# Patient Record
Sex: MALE | Race: WHITE | NOT HISPANIC OR LATINO | Employment: OTHER | URBAN - METROPOLITAN AREA
[De-identification: names, ages, dates, MRNs, and addresses within clinical notes are randomized per-mention and may not be internally consistent; named-entity substitution may affect disease eponyms.]

---

## 2017-03-21 ENCOUNTER — OFFICE VISIT (OUTPATIENT)
Dept: CARDIOLOGY | Facility: CLINIC | Age: 77
End: 2017-03-21

## 2017-03-21 ENCOUNTER — TELEPHONE (OUTPATIENT)
Dept: CARDIOLOGY | Facility: CLINIC | Age: 77
End: 2017-03-21

## 2017-03-21 VITALS
BODY MASS INDEX: 33.79 KG/M2 | HEIGHT: 70 IN | HEART RATE: 64 BPM | WEIGHT: 236 LBS | SYSTOLIC BLOOD PRESSURE: 116 MMHG | DIASTOLIC BLOOD PRESSURE: 80 MMHG

## 2017-03-21 DIAGNOSIS — I25.10 CORONARY ARTERY DISEASE INVOLVING NATIVE CORONARY ARTERY OF NATIVE HEART WITHOUT ANGINA PECTORIS: ICD-10-CM

## 2017-03-21 DIAGNOSIS — N18.9 CHRONIC RENAL INSUFFICIENCY, UNSPECIFIED STAGE: ICD-10-CM

## 2017-03-21 DIAGNOSIS — I10 ESSENTIAL HYPERTENSION: ICD-10-CM

## 2017-03-21 DIAGNOSIS — E11.59 TYPE 2 DIABETES MELLITUS WITH OTHER CIRCULATORY COMPLICATION: ICD-10-CM

## 2017-03-21 DIAGNOSIS — I71.20 THORACIC AORTIC ANEURYSM WITHOUT RUPTURE (HCC): Primary | ICD-10-CM

## 2017-03-21 PROBLEM — E11.9 DIABETES (HCC): Status: ACTIVE | Noted: 2017-03-21

## 2017-03-21 PROCEDURE — 93000 ELECTROCARDIOGRAM COMPLETE: CPT | Performed by: INTERNAL MEDICINE

## 2017-03-21 PROCEDURE — 99214 OFFICE O/P EST MOD 30 MIN: CPT | Performed by: INTERNAL MEDICINE

## 2017-03-21 RX ORDER — ASPIRIN 81 MG/1
81 TABLET ORAL DAILY
COMMUNITY
End: 2018-03-22 | Stop reason: SDDI

## 2017-03-21 NOTE — PROGRESS NOTES
Date of Office Visit: 2017  Encounter Provider: Giana Hughes MD  Place of Service: [facility] CARDIOLOGY  Patient Name: Steve Mace  :1940    Chief complaint  Follow-up of thoracic aortic aneurysm and hypertension    History of Present Illness  Patient is a pleasant 76-year-old gentleman with diabetes, hypertension, hyperlipidemia.  In  he was seen for atypical chest pain.  A CT scan of his chest revealed a large pericardial effusion which was treated with nonsteroidals and resolution of his symptoms and effusion.  Echocardiogram revealed left ventricular dilatation with an ejection fraction of 45-50%, no significant valvular heart disease and aortic root size of 4.3 cm.  He had a PET scan that was negative for ischemia.  By  with medical therapy his ejection fraction normalized to 50%.  He was noted to have mitral valve prolapse without regurgitation.  However by  had recurrent chest pain with a negative perfusion study and an ejection fraction of 43% with apical hypokinesis.  However by echocardiography the ejection fraction was 66%.  He developed renal insufficiency and a CT scan of his chest without contrast in 2015 showed an a 70 aortic size of 4.0 cm.  Coronary calcium was noted in all 3 major vessels.    He has not returned for follow-up and a well.  He denies any chest pain, shortness of breath, palpitations, syncope.  He just felt he needed to return for follow-up.  He is exercising by walking for 40 minutes 4 times a week.  His blood pressure has been controlled.    Past Medical History   Diagnosis Date   • Acute myopericarditis    • Chronic renal insufficiency    • Diabetes    • Hyperlipidemia    • Hypertension    • Mitral valve prolapse    • Prostate cancer    • Sleep apnea      Past Surgical History   Procedure Laterality Date   • Prostate surgery       Outpatient Medications Prior to Visit   Medication Sig Dispense Refill   • fenofibrate  (TRICOR) 145 MG tablet Take 145 mg by mouth Daily.     • lisinopril (PRINIVIL,ZESTRIL) 5 MG tablet Take 5 mg by mouth Daily.     • metFORMIN (GLUCOPHAGE) 500 MG tablet Take 500 mg by mouth Daily With Breakfast.     • metoprolol succinate XL (TOPROL-XL) 25 MG 24 hr tablet Take 25 mg by mouth Daily.     • rosuvastatin (CRESTOR) 10 MG tablet Take 10 mg by mouth Daily.       No facility-administered medications prior to visit.      Allergies as of 03/21/2017   • (No Known Allergies)     Social History     Social History   • Marital status:      Spouse name: N/A   • Number of children: N/A   • Years of education: N/A     Occupational History   • Not on file.     Social History Main Topics   • Smoking status: Former Smoker     Quit date: 10/18/1996   • Smokeless tobacco: Not on file   • Alcohol use Yes   • Drug use: No   • Sexual activity: Defer     Other Topics Concern   • Not on file     Social History Narrative     Family History   Problem Relation Age of Onset   • Heart attack Father    • Diabetes Father    • Heart disease Brother    • Heart attack Brother    • Diabetes Other      Review of Systems   Constitution: Negative for fever, malaise/fatigue, weight gain and weight loss.   HENT: Negative for ear pain, hearing loss, nosebleeds and sore throat.    Eyes: Negative for double vision, pain, vision loss in left eye and vision loss in right eye.   Cardiovascular:        See history of present illness.   Respiratory: Positive for snoring. Negative for cough, shortness of breath, sleep disturbances due to breathing and wheezing.    Endocrine: Negative for cold intolerance, heat intolerance and polyuria.   Skin: Negative for itching, poor wound healing and rash.   Musculoskeletal: Positive for joint pain. Negative for joint swelling and myalgias.   Gastrointestinal: Negative for abdominal pain, diarrhea, hematochezia, nausea and vomiting.   Genitourinary: Negative for hematuria and hesitancy.   Neurological:  "Negative for numbness, paresthesias and seizures.   Psychiatric/Behavioral: Negative for depression. The patient is not nervous/anxious.      Objective:     Vitals:    03/21/17 1021   BP: 116/80   Pulse: 64   Weight: 236 lb (107 kg)   Height: 70\" (177.8 cm)     Body mass index is 33.86 kg/(m^2).    Physical Exam   Constitutional: He is oriented to person, place, and time. He appears well-developed and well-nourished.   Obese   HENT:   Head: Normocephalic.   Nose: Nose normal.   Mouth/Throat: Oropharynx is clear and moist.   Eyes: Conjunctivae and EOM are normal. Pupils are equal, round, and reactive to light. Right eye exhibits no discharge. No scleral icterus.   Neck: Normal range of motion. Neck supple. No JVD present. No thyromegaly present.   Cardiovascular: Normal rate, regular rhythm, normal heart sounds and intact distal pulses.  Exam reveals no gallop and no friction rub.    No murmur heard.  Pulses:       Carotid pulses are 2+ on the right side, and 2+ on the left side.       Radial pulses are 2+ on the right side, and 2+ on the left side.        Femoral pulses are 2+ on the right side, and 2+ on the left side.       Popliteal pulses are 2+ on the right side, and 2+ on the left side.        Dorsalis pedis pulses are 2+ on the right side, and 2+ on the left side.        Posterior tibial pulses are 2+ on the right side, and 2+ on the left side.   Pulmonary/Chest: Effort normal and breath sounds normal. No respiratory distress. He has no wheezes. He has no rales.   Abdominal: Soft. Bowel sounds are normal. He exhibits no distension. There is no hepatosplenomegaly. There is no tenderness. There is no rebound.   Musculoskeletal: Normal range of motion. He exhibits no edema or tenderness.   Neurological: He is alert and oriented to person, place, and time.   Skin: Skin is warm and dry. No rash noted. No erythema.   Psychiatric: He has a normal mood and affect. His behavior is normal. Judgment and thought content " normal.   Vitals reviewed.    Lab Review:     ECG 12 Lead  Date/Time: 3/21/2017 10:32 PM  Performed by: LASHANDA MA  Authorized by: LASHANDA MA   Comparison: compared with previous ECG   Similar to previous ECG  Rhythm: sinus rhythm  Conduction: non-specific intraventricular conduction delay  Conduction comments: QTc = 409msec  Clinical impression: abnormal ECG          Assessment:       Diagnosis Plan   1. Thoracic aortic aneurysm without rupture  CT Chest Without Contrast    Vascular Screening   2. Essential hypertension     3. Coronary artery disease involving native coronary artery of native heart without angina pectoris     4. Chronic renal insufficiency, unspecified stage     5. Type 2 diabetes mellitus with other circulatory complication       Plan:       1.  Thoracic aortic aneurysm will check a CT scan of his chest without contrast.  2.  Coronary artery disease noted by CT imaging.  It has been several years since his last evaluation.  We'll check a Lexiscan cardiac stress test.   3.  Hypertension, controlled  4.  Renal insufficiency, we'll get a copy of blood work from Dr. Curtis.  Patient believes this is stable.  5.  Diabetes  6.  Mitral valve prolapse.  No clinical evidence of regurgitation at this time.  7.  Dyslipidemia  8.  Untreated sleep apnea  9.  Vascular screening.  I have recommended and ordered this.    Coronary Artery Disease  Assessment  • The patient has no angina    Plan  • Lifestyle modifications discussed include adhering to a heart healthy diet, maintenance of a healthy weight, regular exercise and regular monitoring of cholesterol and blood pressure    Subjective - Objective  • Current antiplatelet therapy includes aspirin 81 mg         Steve Mace   Home Medication Instructions MANI:    Printed on:03/21/17 1194   Medication Information                      aspirin 81 MG EC tablet  Take 81 mg by mouth Daily.             fenofibrate (TRICOR) 145 MG tablet  Take 145 mg by mouth  Daily.             lisinopril (PRINIVIL,ZESTRIL) 5 MG tablet  Take 5 mg by mouth Daily.             metFORMIN (GLUCOPHAGE) 500 MG tablet  Take 500 mg by mouth Daily With Breakfast.             metoprolol succinate XL (TOPROL-XL) 25 MG 24 hr tablet  Take 25 mg by mouth Daily.             Multiple Vitamin (MULTI VITAMIN PO)  Take  by mouth.             rosuvastatin (CRESTOR) 10 MG tablet  Take 10 mg by mouth Daily.                 EMR Dragon/Transcription disclaimer:   Much of this encounter note is an electronic transcription/translation of spoken language to printed text. The electronic translation of spoken language may permit erroneous, or at times, nonsensical words or phrases to be inadvertently transcribed; Although I have reviewed the note for such errors, some may still exist.

## 2017-03-28 ENCOUNTER — HOSPITAL ENCOUNTER (OUTPATIENT)
Dept: CT IMAGING | Facility: HOSPITAL | Age: 77
Discharge: HOME | End: 2017-03-28
Attending: INTERNAL MEDICINE | Admitting: INTERNAL MEDICINE

## 2017-03-28 DIAGNOSIS — I71.20 THORACIC AORTIC ANEURYSM WITHOUT RUPTURE (HCC): ICD-10-CM

## 2017-03-28 PROCEDURE — 71250 CT THORAX DX C-: CPT

## 2017-04-03 DIAGNOSIS — I25.10 CORONARY ARTERY DISEASE INVOLVING NATIVE CORONARY ARTERY OF NATIVE HEART WITHOUT ANGINA PECTORIS: Primary | ICD-10-CM

## 2017-04-11 ENCOUNTER — APPOINTMENT (OUTPATIENT)
Dept: CARDIOLOGY | Facility: HOSPITAL | Age: 77
End: 2017-04-11
Attending: INTERNAL MEDICINE

## 2017-05-02 ENCOUNTER — HOSPITAL ENCOUNTER (OUTPATIENT)
Dept: CARDIOLOGY | Facility: HOSPITAL | Age: 77
Discharge: HOME | End: 2017-05-02
Attending: INTERNAL MEDICINE | Admitting: INTERNAL MEDICINE

## 2017-05-02 DIAGNOSIS — I25.10 CORONARY ARTERY DISEASE INVOLVING NATIVE CORONARY ARTERY OF NATIVE HEART WITHOUT ANGINA PECTORIS: ICD-10-CM

## 2017-05-02 LAB
BH CV NUCLEAR PRIOR STUDY: 3
BH CV STRESS BP STAGE 1: NORMAL
BH CV STRESS BP STAGE 2: NORMAL
BH CV STRESS BP STAGE 3: NORMAL
BH CV STRESS DURATION MIN STAGE 1: 3
BH CV STRESS DURATION MIN STAGE 2: 3
BH CV STRESS DURATION MIN STAGE 3: 3
BH CV STRESS DURATION SEC STAGE 1: 0
BH CV STRESS DURATION SEC STAGE 2: 0
BH CV STRESS DURATION SEC STAGE 3: 0
BH CV STRESS GRADE STAGE 1: 10
BH CV STRESS GRADE STAGE 2: 12
BH CV STRESS GRADE STAGE 3: 14
BH CV STRESS HR STAGE 1: 88
BH CV STRESS HR STAGE 2: 110
BH CV STRESS HR STAGE 3: 134
BH CV STRESS METS STAGE 1: 5
BH CV STRESS METS STAGE 2: 7.5
BH CV STRESS METS STAGE 3: 10
BH CV STRESS PROTOCOL 1: NORMAL
BH CV STRESS RECOVERY BP: NORMAL MMHG
BH CV STRESS RECOVERY HR: 90 BPM
BH CV STRESS SPEED STAGE 1: 1.7
BH CV STRESS SPEED STAGE 2: 2.5
BH CV STRESS SPEED STAGE 3: 3.4
BH CV STRESS STAGE 1: 1
BH CV STRESS STAGE 2: 2
BH CV STRESS STAGE 3: 3
LV EF NUC BP: 63 %
MAXIMAL PREDICTED HEART RATE: 146 BPM
PERCENT MAX PREDICTED HR: 91.78 %
STRESS BASELINE BP: NORMAL MMHG
STRESS BASELINE HR: 61 BPM
STRESS PERCENT HR: 108 %
STRESS POST ESTIMATED WORKLOAD: 10 METS
STRESS POST EXERCISE DUR MIN: 9 MIN
STRESS POST EXERCISE DUR SEC: 0 SEC
STRESS POST PEAK BP: NORMAL MMHG
STRESS POST PEAK HR: 134 BPM
STRESS TARGET HR: 124 BPM

## 2017-05-02 PROCEDURE — 93017 CV STRESS TEST TRACING ONLY: CPT

## 2017-05-02 PROCEDURE — 78452 HT MUSCLE IMAGE SPECT MULT: CPT

## 2017-05-02 PROCEDURE — A9502 TC99M TETROFOSMIN: HCPCS | Performed by: INTERNAL MEDICINE

## 2017-05-02 PROCEDURE — 93016 CV STRESS TEST SUPVJ ONLY: CPT | Performed by: INTERNAL MEDICINE

## 2017-05-02 PROCEDURE — 0 TECHNETIUM TETROFOSMIN KIT: Performed by: INTERNAL MEDICINE

## 2017-05-02 PROCEDURE — 78452 HT MUSCLE IMAGE SPECT MULT: CPT | Performed by: INTERNAL MEDICINE

## 2017-05-02 PROCEDURE — 93018 CV STRESS TEST I&R ONLY: CPT | Performed by: INTERNAL MEDICINE

## 2017-05-02 RX ADMIN — TETROFOSMIN 1 DOSE: 1.38 INJECTION, POWDER, LYOPHILIZED, FOR SOLUTION INTRAVENOUS at 08:52

## 2017-05-02 RX ADMIN — TETROFOSMIN 1 DOSE: 1.38 INJECTION, POWDER, LYOPHILIZED, FOR SOLUTION INTRAVENOUS at 08:30

## 2017-05-05 ENCOUNTER — TELEPHONE (OUTPATIENT)
Dept: CARDIOLOGY | Facility: CLINIC | Age: 77
End: 2017-05-05

## 2017-05-15 DIAGNOSIS — I51.7 ENLARGED RV (RIGHT VENTRICLE): Primary | ICD-10-CM

## 2017-05-23 ENCOUNTER — HOSPITAL ENCOUNTER (OUTPATIENT)
Dept: CARDIOLOGY | Facility: HOSPITAL | Age: 77
Discharge: HOME | End: 2017-05-23
Attending: INTERNAL MEDICINE | Admitting: INTERNAL MEDICINE

## 2017-05-23 VITALS
HEART RATE: 71 BPM | DIASTOLIC BLOOD PRESSURE: 66 MMHG | BODY MASS INDEX: 33.79 KG/M2 | WEIGHT: 236 LBS | SYSTOLIC BLOOD PRESSURE: 130 MMHG | HEIGHT: 70 IN

## 2017-05-23 DIAGNOSIS — I51.7 ENLARGED RV (RIGHT VENTRICLE): ICD-10-CM

## 2017-05-23 LAB
ASCENDING AORTA: 3.7 CM
BH CV ECHO MEAS - ACS: 2.2 CM
BH CV ECHO MEAS - AO MAX PG (FULL): 2.2 MMHG
BH CV ECHO MEAS - AO MAX PG: 5.8 MMHG
BH CV ECHO MEAS - AO MEAN PG (FULL): 1.5 MMHG
BH CV ECHO MEAS - AO MEAN PG: 3.6 MMHG
BH CV ECHO MEAS - AO ROOT AREA (BSA CORRECTED): 1.7
BH CV ECHO MEAS - AO ROOT AREA: 12.5 CM^2
BH CV ECHO MEAS - AO ROOT DIAM: 4 CM
BH CV ECHO MEAS - AO V2 MAX: 120.5 CM/SEC
BH CV ECHO MEAS - AO V2 MEAN: 90.1 CM/SEC
BH CV ECHO MEAS - AO V2 VTI: 25.1 CM
BH CV ECHO MEAS - AVA(I,A): 3.4 CM^2
BH CV ECHO MEAS - AVA(I,D): 3.4 CM^2
BH CV ECHO MEAS - AVA(V,A): 3.3 CM^2
BH CV ECHO MEAS - AVA(V,D): 3.3 CM^2
BH CV ECHO MEAS - BSA(HAYCOCK): 2.4 M^2
BH CV ECHO MEAS - BSA: 2.3 M^2
BH CV ECHO MEAS - BZI_BMI: 36.7 KILOGRAMS/M^2
BH CV ECHO MEAS - BZI_METRIC_HEIGHT: 177.8 CM
BH CV ECHO MEAS - BZI_METRIC_WEIGHT: 116.1 KG
BH CV ECHO MEAS - CONTRAST EF (2CH): 63.6 ML/M^2
BH CV ECHO MEAS - CONTRAST EF 4CH: 60.3 ML/M^2
BH CV ECHO MEAS - EDV(MOD-SP2): 121 ML
BH CV ECHO MEAS - EDV(MOD-SP4): 116 ML
BH CV ECHO MEAS - EDV(TEICH): 179.5 ML
BH CV ECHO MEAS - EF(CUBED): 74.4 %
BH CV ECHO MEAS - EF(MOD-SP2): 63.6 %
BH CV ECHO MEAS - EF(MOD-SP4): 60.3 %
BH CV ECHO MEAS - EF(TEICH): 65.4 %
BH CV ECHO MEAS - ESV(MOD-SP2): 44 ML
BH CV ECHO MEAS - ESV(MOD-SP4): 46 ML
BH CV ECHO MEAS - ESV(TEICH): 62.1 ML
BH CV ECHO MEAS - FS: 36.5 %
BH CV ECHO MEAS - IVS/LVPW: 1.1
BH CV ECHO MEAS - IVSD: 1 CM
BH CV ECHO MEAS - LAT PEAK E' VEL: 11 CM/SEC
BH CV ECHO MEAS - LV DIASTOLIC VOL/BSA (35-75): 50 ML/M^2
BH CV ECHO MEAS - LV MASS(C)D: 227.8 GRAMS
BH CV ECHO MEAS - LV MASS(C)DI: 98.3 GRAMS/M^2
BH CV ECHO MEAS - LV MAX PG: 3.6 MMHG
BH CV ECHO MEAS - LV MEAN PG: 2 MMHG
BH CV ECHO MEAS - LV SYSTOLIC VOL/BSA (12-30): 19.8 ML/M^2
BH CV ECHO MEAS - LV V1 MAX: 95.1 CM/SEC
BH CV ECHO MEAS - LV V1 MEAN: 67.5 CM/SEC
BH CV ECHO MEAS - LV V1 VTI: 20 CM
BH CV ECHO MEAS - LVIDD: 6 CM
BH CV ECHO MEAS - LVIDS: 3.8 CM
BH CV ECHO MEAS - LVLD AP2: 8.4 CM
BH CV ECHO MEAS - LVLD AP4: 8.2 CM
BH CV ECHO MEAS - LVLS AP2: 6.9 CM
BH CV ECHO MEAS - LVLS AP4: 6.8 CM
BH CV ECHO MEAS - LVOT AREA (M): 4.2 CM^2
BH CV ECHO MEAS - LVOT AREA: 4.2 CM^2
BH CV ECHO MEAS - LVOT DIAM: 2.3 CM
BH CV ECHO MEAS - LVPWD: 0.88 CM
BH CV ECHO MEAS - MED PEAK E' VEL: 10 CM/SEC
BH CV ECHO MEAS - MR MAX PG: 19.1 MMHG
BH CV ECHO MEAS - MR MAX VEL: 218.3 CM/SEC
BH CV ECHO MEAS - MV A DUR: 0.11 SEC
BH CV ECHO MEAS - MV A MAX VEL: 64.5 CM/SEC
BH CV ECHO MEAS - MV DEC SLOPE: 541.8 CM/SEC^2
BH CV ECHO MEAS - MV DEC TIME: 0.16 SEC
BH CV ECHO MEAS - MV E MAX VEL: 90.7 CM/SEC
BH CV ECHO MEAS - MV E/A: 1.4
BH CV ECHO MEAS - MV MAX PG: 2.9 MMHG
BH CV ECHO MEAS - MV MEAN PG: 1.2 MMHG
BH CV ECHO MEAS - MV P1/2T MAX VEL: 88.3 CM/SEC
BH CV ECHO MEAS - MV P1/2T: 47.7 MSEC
BH CV ECHO MEAS - MV V2 MAX: 85.8 CM/SEC
BH CV ECHO MEAS - MV V2 MEAN: 50.8 CM/SEC
BH CV ECHO MEAS - MV V2 VTI: 25.2 CM
BH CV ECHO MEAS - MVA P1/2T LCG: 2.5 CM^2
BH CV ECHO MEAS - MVA(P1/2T): 4.6 CM^2
BH CV ECHO MEAS - MVA(VTI): 3.4 CM^2
BH CV ECHO MEAS - PA ACC TIME: 0.12 SEC
BH CV ECHO MEAS - PA MAX PG (FULL): 3.4 MMHG
BH CV ECHO MEAS - PA MAX PG: 5.1 MMHG
BH CV ECHO MEAS - PA PR(ACCEL): 25.1 MMHG
BH CV ECHO MEAS - PA V2 MAX: 112.9 CM/SEC
BH CV ECHO MEAS - PULM A REVS DUR: 0.11 SEC
BH CV ECHO MEAS - PULM A REVS VEL: 35.1 CM/SEC
BH CV ECHO MEAS - PULM DIAS VEL: 47 CM/SEC
BH CV ECHO MEAS - PULM S/D: 1.6
BH CV ECHO MEAS - PULM SYS VEL: 73.2 CM/SEC
BH CV ECHO MEAS - PVA(V,A): 2.3 CM^2
BH CV ECHO MEAS - PVA(V,D): 2.3 CM^2
BH CV ECHO MEAS - QP/QS: 0.65
BH CV ECHO MEAS - RAP SYSTOLE: 3 MMHG
BH CV ECHO MEAS - RV MAX PG: 1.7 MMHG
BH CV ECHO MEAS - RV MEAN PG: 0.96 MMHG
BH CV ECHO MEAS - RV V1 MAX: 65.5 CM/SEC
BH CV ECHO MEAS - RV V1 MEAN: 46.3 CM/SEC
BH CV ECHO MEAS - RV V1 VTI: 14 CM
BH CV ECHO MEAS - RVOT AREA: 3.9 CM^2
BH CV ECHO MEAS - RVOT DIAM: 2.2 CM
BH CV ECHO MEAS - RVSP: 27 MMHG
BH CV ECHO MEAS - SI(AO): 135 ML/M^2
BH CV ECHO MEAS - SI(CUBED): 69.1 ML/M^2
BH CV ECHO MEAS - SI(LVOT): 36.5 ML/M^2
BH CV ECHO MEAS - SI(MOD-SP2): 33.2 ML/M^2
BH CV ECHO MEAS - SI(MOD-SP4): 30.2 ML/M^2
BH CV ECHO MEAS - SI(TEICH): 50.6 ML/M^2
BH CV ECHO MEAS - SUP REN AO DIAM: 1.8 CM
BH CV ECHO MEAS - SV(AO): 312.9 ML
BH CV ECHO MEAS - SV(CUBED): 160.2 ML
BH CV ECHO MEAS - SV(LVOT): 84.6 ML
BH CV ECHO MEAS - SV(MOD-SP2): 77 ML
BH CV ECHO MEAS - SV(MOD-SP4): 70 ML
BH CV ECHO MEAS - SV(RVOT): 54.6 ML
BH CV ECHO MEAS - SV(TEICH): 117.4 ML
BH CV ECHO MEAS - TAPSE (>1.6): 2.3 CM2
BH CV ECHO MEAS - TR MAX VEL: 243.2 CM/SEC
BH CV XLRA - RV BASE: 4 CM
BH CV XLRA - TDI S': 14 CM/SEC
E/E' RATIO: 8.5
LEFT ATRIUM VOLUME INDEX: 21 ML/M2
LV EF 2D ECHO EST: 60 %
SINUS: 3.8 CM
STJ: 3.4 CM

## 2017-05-23 PROCEDURE — 93306 TTE W/DOPPLER COMPLETE: CPT | Performed by: INTERNAL MEDICINE

## 2017-05-23 PROCEDURE — 93306 TTE W/DOPPLER COMPLETE: CPT

## 2017-05-24 ENCOUNTER — TELEPHONE (OUTPATIENT)
Dept: CARDIOLOGY | Facility: CLINIC | Age: 77
End: 2017-05-24

## 2017-07-16 ENCOUNTER — HOSPITAL ENCOUNTER (OUTPATIENT)
Dept: CARDIOLOGY | Facility: HOSPITAL | Age: 77
Discharge: HOME | End: 2017-07-16
Attending: INTERNAL MEDICINE

## 2017-07-16 DIAGNOSIS — I71.20 THORACIC AORTIC ANEURYSM WITHOUT RUPTURE (HCC): ICD-10-CM

## 2017-07-17 LAB
BH CV XLRA MEAS - PAD LEFT ABI PT: 1.28
BH CV XLRA MEAS - PAD LEFT ARM: 136 MMHG
BH CV XLRA MEAS - PAD LEFT LEG PT: 174 MMHG
BH CV XLRA MEAS - PAD RIGHT ABI PT: 1.32
BH CV XLRA MEAS - PAD RIGHT ARM: 124 MMHG
BH CV XLRA MEAS - PAD RIGHT LEG PT: 179 MMHG
BH CV XLRA MEAS - PROX AO DIAM: 1.4 CM
BH CV XLRA MEAS LEFT ICA/CCA RATIO: 1
BH CV XLRA MEAS LEFT MID CCA PSV: NORMAL CM/SEC
BH CV XLRA MEAS LEFT MID ICA PSV: NORMAL CM/SEC
BH CV XLRA MEAS LEFT PROX ECA PSV: 79 CM/SEC
BH CV XLRA MEAS RIGHT ICA/CCA RATIO: 0.69
BH CV XLRA MEAS RIGHT MID CCA PSV: NORMAL CM/SEC
BH CV XLRA MEAS RIGHT MID ICA PSV: NORMAL CM/SEC
BH CV XLRA MEAS RIGHT PROX ECA PSV: 73 CM/SEC

## 2017-07-18 ENCOUNTER — TELEPHONE (OUTPATIENT)
Dept: CARDIOLOGY | Facility: CLINIC | Age: 77
End: 2017-07-18

## 2017-07-18 NOTE — TELEPHONE ENCOUNTER
----- Message from Giana Hughes MD sent at 7/18/2017  4:04 PM EDT -----  Please let patient know vascular screening study is normal. ashley

## 2018-03-22 ENCOUNTER — TELEPHONE (OUTPATIENT)
Dept: CARDIOLOGY | Facility: CLINIC | Age: 78
End: 2018-03-22

## 2018-03-22 ENCOUNTER — OFFICE VISIT (OUTPATIENT)
Dept: CARDIOLOGY | Facility: CLINIC | Age: 78
End: 2018-03-22

## 2018-03-22 VITALS
HEIGHT: 70 IN | RESPIRATION RATE: 20 BRPM | DIASTOLIC BLOOD PRESSURE: 84 MMHG | HEART RATE: 58 BPM | SYSTOLIC BLOOD PRESSURE: 142 MMHG | BODY MASS INDEX: 32.35 KG/M2 | WEIGHT: 226 LBS

## 2018-03-22 DIAGNOSIS — N18.9 CHRONIC RENAL IMPAIRMENT, UNSPECIFIED CKD STAGE: ICD-10-CM

## 2018-03-22 DIAGNOSIS — I71.20 THORACIC AORTIC ANEURYSM WITHOUT RUPTURE (HCC): ICD-10-CM

## 2018-03-22 DIAGNOSIS — I10 ESSENTIAL HYPERTENSION: ICD-10-CM

## 2018-03-22 DIAGNOSIS — I25.10 CORONARY ARTERY DISEASE INVOLVING NATIVE CORONARY ARTERY OF NATIVE HEART WITHOUT ANGINA PECTORIS: Primary | ICD-10-CM

## 2018-03-22 DIAGNOSIS — E11.59 TYPE 2 DIABETES MELLITUS WITH OTHER CIRCULATORY COMPLICATION, WITHOUT LONG-TERM CURRENT USE OF INSULIN (HCC): ICD-10-CM

## 2018-03-22 PROCEDURE — 99214 OFFICE O/P EST MOD 30 MIN: CPT | Performed by: INTERNAL MEDICINE

## 2018-03-22 PROCEDURE — 93000 ELECTROCARDIOGRAM COMPLETE: CPT | Performed by: INTERNAL MEDICINE

## 2018-03-22 RX ORDER — CHLORAL HYDRATE 500 MG
2000 CAPSULE ORAL
COMMUNITY
End: 2021-10-23 | Stop reason: HOSPADM

## 2018-03-22 NOTE — PROGRESS NOTES
Date of Office Visit: 2018  Encounter Provider: Giana Hughes MD  Place of Service: [facility] CARDIOLOGY  Patient Name: Steve Mace  :1940    Chief complaint  Follow-up of thoracic aortic aneurysm, transient cardiomyopathy, CAD and hypertension    History of Present Illness  Patient is a pleasant 77-year-old gentleman with diabetes, hypertension, hyperlipidemia.  In  he was seen for atypical chest pain.  A CT scan of his chest revealed a large pericardial effusion which was treated with nonsteroidals and resolution of his symptoms and effusion.  Echocardiogram revealed left ventricular dilatation with an ejection fraction of 45-50%, no significant valvular heart disease and aortic root size of 4.3 cm.  He had a PET scan that was negative for ischemia.  By  with medical therapy his ejection fraction normalized to 50%.  He was noted to have mitral valve prolapse without regurgitation.  He also noted to have multivessel coronary artery disease by coronary CT angiography.  In May 2017 he had an echocardiogram revealed an ejection fraction of 60% with an aortic root measured at 4.0 cm.  Ascending aorta was measured 3.7 cm.  Aortic valve is trileaflet and functioning normally.  He had a stress perfusion study that was negative for ischemia.  The right ventricle was enlarged though not noted by echocardiography.  CT angiogram in 2017 revealed a ascending aorta measuring 4.0 cm.  Three-vessel coronary artery disease was noted.    Since last visit patient states blood pressures been slightly lower in the 130s he is not exercising over the past 3 weeks but has just returned  to his exercise program after vacation.  He exercises aerobically 40 minutes a week.  He denies any chest pain, shortness of breath, palpitations, syncope near syncope.  Over the past 9 months she's had intermittent right lower quadrant tenderness that occurs when he bends over or changes  positions.  [facility] and [facility] was [facility].  Denies any melena or bright red per rectum.    Past Medical History:   Diagnosis Date   • Acute myopericarditis    • Chronic renal insufficiency    • Diabetes    • Hyperlipidemia    • Hypertension    • Mitral valve prolapse    • Prostate cancer    • Sleep apnea      Past Surgical History:   Procedure Laterality Date   • PROSTATE SURGERY         Current Outpatient Prescriptions:   •  ATORVASTATIN CALCIUM PO, Take  by mouth every night at bedtime., Disp: , Rfl:   •  lisinopril (PRINIVIL,ZESTRIL) 5 MG tablet, Take 5 mg by mouth Daily., Disp: , Rfl:   •  metFORMIN (GLUCOPHAGE) 500 MG tablet, Take 500 mg by mouth Daily With Breakfast., Disp: , Rfl:   •  metoprolol succinate XL (TOPROL-XL) 25 MG 24 hr tablet, Take 25 mg by mouth Daily., Disp: , Rfl:   •  Multiple Vitamin (MULTI VITAMIN PO), Take  by mouth Daily., Disp: , Rfl:   •  Omega-3 Fatty Acids (FISH OIL) 1000 MG capsule capsule, Take  by mouth Daily With Breakfast., Disp: , Rfl:     (Not in a hospital admission)    Allergies as of 03/22/2018   • (No Known Allergies)     Social History     Social History   • Marital status:      Spouse name: N/A   • Number of children: N/A   • Years of education: N/A     Occupational History   • Not on file.     Social History Main Topics   • Smoking status: Former Smoker     Quit date: 10/18/1996   • Smokeless tobacco: Never Used   • Alcohol use Yes   • Drug use: No   • Sexual activity: Defer     Other Topics Concern   • Not on file     Social History Narrative   • No narrative on file     Family History   Problem Relation Age of Onset   • Heart attack Father    • Diabetes Father    • Heart disease Brother    • Heart attack Brother    • Diabetes Other      Review of Systems   Constitution: Negative for fever, malaise/fatigue, weight gain and weight loss.   HENT: Negative for ear pain, hearing loss, nosebleeds and sore throat.   "  Eyes: Negative for double vision, pain, vision loss in left eye and vision loss in right eye.   Cardiovascular:        See history of present illness.   Respiratory: Negative for cough, shortness of breath, sleep disturbances due to breathing, snoring and wheezing.    Endocrine: Negative for cold intolerance, heat intolerance and polyuria.   Skin: Negative for itching, poor wound healing and rash.   Musculoskeletal: Negative for joint pain, joint swelling and myalgias.   Gastrointestinal: Positive for abdominal pain. Negative for diarrhea, hematochezia, nausea and vomiting.   Genitourinary: Negative for hematuria and hesitancy.   Neurological: Negative for numbness, paresthesias and seizures.   Psychiatric/Behavioral: Negative for depression. The patient is not nervous/anxious.         Objective:     Vitals:    03/22/18 0821   BP: 142/84   Pulse: 58   Resp: 20   Weight: 103 kg (226 lb)   Height: 177.8 cm (70\")     Body mass index is 32.43 kg/m².    Physical Exam   Constitutional: He is oriented to person, place, and time. He appears well-developed and well-nourished.   Obese     HENT:   Head: Normocephalic.   Nose: Nose normal.   Mouth/Throat: Oropharynx is clear and moist.   Eyes: Conjunctivae and EOM are normal. Pupils are equal, round, and reactive to light. Right eye exhibits no discharge. No scleral icterus.   Neck: Normal range of motion. Neck supple. No JVD present. No thyromegaly present.   Cardiovascular: Normal rate, regular rhythm, normal heart sounds and intact distal pulses.  Exam reveals no gallop and no friction rub.    No murmur heard.  Pulses:       Carotid pulses are 2+ on the right side, and 2+ on the left side.       Radial pulses are 2+ on the right side, and 2+ on the left side.        Femoral pulses are 2+ on the right side, and 2+ on the left side.       Popliteal pulses are 2+ on the right side, and 2+ on the left side.        Dorsalis pedis pulses are 2+ on the right side, and 2+ on the " left side.        Posterior tibial pulses are 2+ on the right side, and 2+ on the left side.   Pulmonary/Chest: Effort normal and breath sounds normal. No respiratory distress. He has no wheezes. He has no rales.   Abdominal: Soft. Bowel sounds are normal. He exhibits no distension. There is no hepatosplenomegaly. There is no tenderness. There is no rebound.   Musculoskeletal: Normal range of motion. He exhibits no edema or tenderness.   Neurological: He is alert and oriented to person, place, and time.   Skin: Skin is warm and dry. No rash noted. No erythema.   Psychiatric: He has a normal mood and affect. His behavior is normal. Judgment and thought content normal.   Vitals reviewed.    Lab Review:     ECG 12 Lead  Date/Time: 3/22/2018 8:26 AM  Performed by: LASHANDA MA  Authorized by: LASHANDA MA   Comparison: compared with previous ECG   Similar to previous ECG  Rhythm: sinus rhythm  Conduction: 1st degree and non-specific intraventricular conduction delay  Clinical impression: abnormal ECG          Assessment:       Diagnosis Plan   1. Coronary artery disease involving native coronary artery of native heart without angina pectoris  ECG 12 Lead   2. Thoracic aortic aneurysm without rupture  CT Chest Without Contrast   3. Essential hypertension     4. Type 2 diabetes mellitus with other circulatory complication, without long-term current use of insulin     5. Chronic renal impairment, unspecified CKD stage       Plan:       1.  Thoracic aortic aneurysm, get CT chest without contrast  2.  Coronary artery disease noted by CT imaging.  3.  Hypertension, start low salt diet with goal -120/60-70mmHg  4.  Renal insufficiency,  5.  Diabetes  6.  Mitral valve prolapse.Not well seen on the last echocardiogram was felt to be probably normal.  7.  Dyslipidemia  8.  Untreated sleep apnea  9.  Vascular screening. Normal screening 7/2017  10. Abdominal pain.  [facility]'ll [facility]  In  addition he'll discuss further evaluation with Dr. Avalos home he is to see next week    Coronary Artery Disease  Assessment  • The patient has no angina    Plan  • Lifestyle modifications discussed include adhering to a heart healthy diet, maintenance of a healthy weight, regular exercise and regular monitoring of cholesterol and blood pressure    Subjective - Objective  • Current antiplatelet therapy includes aspirin 81 mg       Steve Mace Medication Instructions MANI:    Printed on:03/25/18 1919   Medication Information                      ATORVASTATIN CALCIUM PO  Take  by mouth every night at bedtime.             lisinopril (PRINIVIL,ZESTRIL) 5 MG tablet  Take 5 mg by mouth Daily.             metFORMIN (GLUCOPHAGE) 500 MG tablet  Take 500 mg by mouth Daily With Breakfast.             metoprolol succinate XL (TOPROL-XL) 25 MG 24 hr tablet  Take 25 mg by mouth Daily.             Multiple Vitamin (MULTI VITAMIN PO)  Take  by mouth Daily.             Omega-3 Fatty Acids (FISH OIL) 1000 MG capsule capsule  Take  by mouth Daily With Breakfast.                 No orders of the defined types were placed in this encounter.      Dictated utilizing Dragon dictation

## 2018-03-29 ENCOUNTER — HOSPITAL ENCOUNTER (OUTPATIENT)
Dept: CT IMAGING | Facility: HOSPITAL | Age: 78
Discharge: HOME | End: 2018-03-29
Attending: INTERNAL MEDICINE | Admitting: INTERNAL MEDICINE

## 2018-03-29 DIAGNOSIS — I71.20 THORACIC AORTIC ANEURYSM WITHOUT RUPTURE (HCC): ICD-10-CM

## 2018-03-29 PROCEDURE — 71250 CT THORAX DX C-: CPT

## 2018-04-01 ENCOUNTER — TELEPHONE (OUTPATIENT)
Dept: CARDIOLOGY | Facility: CLINIC | Age: 78
End: 2018-04-01

## 2019-02-28 ENCOUNTER — APPOINTMENT (OUTPATIENT)
Dept: GENERAL RADIOLOGY | Facility: HOSPITAL | Age: 79
End: 2019-02-28

## 2019-02-28 ENCOUNTER — HOSPITAL ENCOUNTER (EMERGENCY)
Facility: HOSPITAL | Age: 79
Discharge: HOME | End: 2019-02-28
Attending: EMERGENCY MEDICINE | Admitting: EMERGENCY MEDICINE

## 2019-02-28 VITALS
SYSTOLIC BLOOD PRESSURE: 142 MMHG | TEMPERATURE: 98.1 F | OXYGEN SATURATION: 96 % | DIASTOLIC BLOOD PRESSURE: 75 MMHG | HEIGHT: 70 IN | WEIGHT: 225 LBS | HEART RATE: 90 BPM | RESPIRATION RATE: 16 BRPM | BODY MASS INDEX: 32.21 KG/M2

## 2019-02-28 DIAGNOSIS — J20.9 ACUTE BRONCHITIS, UNSPECIFIED ORGANISM: Primary | ICD-10-CM

## 2019-02-28 DIAGNOSIS — R06.2 WHEEZING: ICD-10-CM

## 2019-02-28 LAB
HOLD SPECIMEN: NORMAL
HOLD SPECIMEN: NORMAL
WHOLE BLOOD HOLD SPECIMEN: NORMAL
WHOLE BLOOD HOLD SPECIMEN: NORMAL

## 2019-02-28 PROCEDURE — 71046 X-RAY EXAM CHEST 2 VIEWS: CPT

## 2019-02-28 PROCEDURE — 99283 EMERGENCY DEPT VISIT LOW MDM: CPT

## 2019-02-28 PROCEDURE — 94640 AIRWAY INHALATION TREATMENT: CPT

## 2019-02-28 RX ORDER — DEXTROMETHORPHAN HYDROBROMIDE AND PROMETHAZINE HYDROCHLORIDE 15; 6.25 MG/5ML; MG/5ML
5 SYRUP ORAL 4 TIMES DAILY PRN
Qty: 118 ML | Refills: 0 | OUTPATIENT
Start: 2019-02-28 | End: 2019-08-23

## 2019-02-28 RX ORDER — IPRATROPIUM BROMIDE AND ALBUTEROL SULFATE 2.5; .5 MG/3ML; MG/3ML
3 SOLUTION RESPIRATORY (INHALATION) ONCE
Status: COMPLETED | OUTPATIENT
Start: 2019-02-28 | End: 2019-02-28

## 2019-02-28 RX ORDER — PREDNISONE 50 MG/1
TABLET ORAL
Qty: 5 TABLET | Refills: 0 | OUTPATIENT
Start: 2019-02-28 | End: 2019-08-23

## 2019-02-28 RX ORDER — FLUTICASONE PROPIONATE 50 MCG
2 SPRAY, SUSPENSION (ML) NASAL DAILY
COMMUNITY
End: 2021-05-10

## 2019-02-28 RX ADMIN — IPRATROPIUM BROMIDE AND ALBUTEROL SULFATE 3 ML: 2.5; .5 SOLUTION RESPIRATORY (INHALATION) at 17:41

## 2019-03-21 ENCOUNTER — OFFICE VISIT (OUTPATIENT)
Dept: CARDIOLOGY | Facility: CLINIC | Age: 79
End: 2019-03-21

## 2019-03-21 VITALS
DIASTOLIC BLOOD PRESSURE: 80 MMHG | HEART RATE: 70 BPM | SYSTOLIC BLOOD PRESSURE: 126 MMHG | HEIGHT: 70 IN | WEIGHT: 234.6 LBS | BODY MASS INDEX: 33.58 KG/M2

## 2019-03-21 DIAGNOSIS — I25.10 CORONARY ARTERY DISEASE INVOLVING NATIVE CORONARY ARTERY OF NATIVE HEART WITHOUT ANGINA PECTORIS: ICD-10-CM

## 2019-03-21 DIAGNOSIS — E11.59 TYPE 2 DIABETES MELLITUS WITH OTHER CIRCULATORY COMPLICATION, WITHOUT LONG-TERM CURRENT USE OF INSULIN (HCC): ICD-10-CM

## 2019-03-21 DIAGNOSIS — I71.20 THORACIC AORTIC ANEURYSM WITHOUT RUPTURE (HCC): Primary | ICD-10-CM

## 2019-03-21 DIAGNOSIS — N18.9 CHRONIC RENAL IMPAIRMENT, UNSPECIFIED CKD STAGE: ICD-10-CM

## 2019-03-21 DIAGNOSIS — I10 ESSENTIAL HYPERTENSION: ICD-10-CM

## 2019-03-21 PROCEDURE — 99214 OFFICE O/P EST MOD 30 MIN: CPT | Performed by: INTERNAL MEDICINE

## 2019-03-21 PROCEDURE — 93000 ELECTROCARDIOGRAM COMPLETE: CPT | Performed by: INTERNAL MEDICINE

## 2019-03-21 RX ORDER — ASPIRIN 81 MG/1
81 TABLET ORAL DAILY
COMMUNITY
End: 2021-10-23 | Stop reason: HOSPADM

## 2019-03-21 RX ORDER — METOPROLOL SUCCINATE 50 MG/1
50 TABLET, EXTENDED RELEASE ORAL DAILY
COMMUNITY
End: 2021-05-10

## 2019-03-21 NOTE — PROGRESS NOTES
Date of Office Visit: 2019  Encounter Provider: Giana Hughes MD  Place of Service: [facility] CARDIOLOGY  Patient Name: Steve Mace  :1940    Chief complaint  Follow-up of thoracic aortic aneurysm, transient cardiomyopathy, CAD and hypertension    History of Present Illness  Patient is a pleasant 78-year-old gentleman with diabetes, hypertension, hyperlipidemia.  In  he was seen for atypical chest pain.  A CT scan of his chest revealed a large pericardial effusion which was treated with nonsteroidals and resolution of his symptoms and effusion.  Echocardiogram revealed left ventricular dilatation with an ejection fraction of 45-50%, no significant valvular heart disease and aortic root size of 4.3 cm.  He had a PET scan that was negative for ischemia.  By  with medical therapy his ejection fraction normalized to 50%.  He was noted to have mitral valve prolapse without regurgitation.  He also noted to have multivessel coronary artery disease by coronary CT angiography.  In May 2017 he had an echocardiogram revealed an ejection fraction of 60% with an aortic root measured at 4.0 cm.  Ascending aorta was measured 3.7 cm.  Aortic valve is trileaflet and functioning normally.  He had a stress perfusion study that was negative for ischemia.  The right ventricle was enlarged though not noted by echocardiography.  CT angiogram of his chest in 2019 with a sending aorta was felt to be mildly dilated and unchanged and measuring 3.9 cm with mild coronary artery calcification.    Last visit he denies any chest pain palpitations syncope near syncope.  He is recovering from bronchitis.  Blood work performed with his nephrologist last week regarding renal insufficiency.  He is using his CPAP over the past year and feels less fatigue    Past Medical History:   Diagnosis Date   • Acute myopericarditis    • Chronic renal insufficiency    • Coronary artery disease    •  Diabetes (CMS/HCC)    • Hyperlipidemia    • Hypertension    • Mitral valve prolapse    • Prostate cancer (CMS/HCC)    • Sleep apnea    • Thoracic aortic aneurysm without rupture (CMS/HCC)      Past Surgical History:   Procedure Laterality Date   • PROSTATE SURGERY       Outpatient Medications Prior to Visit   Medication Sig Dispense Refill   • aspirin 81 MG EC tablet Take 81 mg by mouth Daily.     • ATORVASTATIN CALCIUM PO Take 40 mg by mouth every night at bedtime.     • fluticasone (FLONASE) 50 MCG/ACT nasal spray 2 sprays into the nostril(s) as directed by provider Daily.     • metFORMIN (GLUCOPHAGE) 500 MG tablet Take 500 mg by mouth Daily With Breakfast.     • metoprolol succinate XL (TOPROL-XL) 50 MG 24 hr tablet Take 50 mg by mouth Daily.     • Multiple Vitamin (MULTI VITAMIN PO) Take  by mouth Daily.     • Omega-3 Fatty Acids (FISH OIL) 1000 MG capsule capsule Take  by mouth Daily With Breakfast.     • predniSONE (DELTASONE) 50 MG tablet Take one tab by mouth daily for 5 days. 5 tablet 0   • promethazine-dextromethorphan (PROMETHAZINE-DM) 6.25-15 MG/5ML syrup Take 5 mL by mouth 4 (Four) Times a Day As Needed for Cough. 118 mL 0   • doxycycline (MONODOX) 100 MG capsule Take 1 capsule by mouth 2 (Two) Times a Day. 20 capsule 0   • lisinopril (PRINIVIL,ZESTRIL) 5 MG tablet Take 5 mg by mouth Daily.     • metoprolol succinate XL (TOPROL-XL) 25 MG 24 hr tablet Take 50 mg by mouth Daily.     • promethazine-codeine (PHENERGAN with CODEINE) 6.25-10 MG/5ML syrup Take 5-10 ml QHS prn for cough. 120 mL 0     No facility-administered medications prior to visit.        Allergies as of 03/21/2019   • (No Known Allergies)     Social History     Socioeconomic History   • Marital status:      Spouse name: Not on file   • Number of children: Not on file   • Years of education: Not on file   • Highest education level: Not on file   Tobacco Use   • Smoking status: Former Smoker     Last attempt to quit: 10/18/1996      "Years since quittin.4   • Smokeless tobacco: Never Used   Substance and Sexual Activity   • Alcohol use: Yes   • Drug use: No   • Sexual activity: Defer   Lifestyle   • Physical activity:     Days per week: 4 days     Minutes per session: 60 min   • Stress: Not on file     Family History   Problem Relation Age of Onset   • Heart attack Father    • Diabetes Father    • Heart disease Brother    • Heart attack Brother    • Diabetes Other      Review of Systems   Constitution: Negative for fever, malaise/fatigue, weight gain and weight loss.   HENT: Negative for ear pain, hearing loss, nosebleeds and sore throat.    Eyes: Negative for double vision, pain, vision loss in left eye and vision loss in right eye.   Cardiovascular:        See history of present illness.   Respiratory: Positive for cough, shortness of breath and wheezing. Negative for sleep disturbances due to breathing and snoring.    Endocrine: Negative for cold intolerance, heat intolerance and polyuria.   Skin: Negative for itching, poor wound healing and rash.   Musculoskeletal: Negative for joint pain, joint swelling and myalgias.   Gastrointestinal: Negative for abdominal pain, diarrhea, hematochezia, nausea and vomiting.   Genitourinary: Negative for hematuria and hesitancy.   Neurological: Negative for numbness, paresthesias and seizures.   Psychiatric/Behavioral: Negative for depression. The patient is not nervous/anxious.         Objective:     Vitals:    19 0921   BP: 126/80   Pulse: 70   Weight: 106 kg (234 lb 9.6 oz)   Height: 177.8 cm (70\")     Body mass index is 33.66 kg/m².    Physical Exam   Constitutional: He is oriented to person, place, and time. He appears well-developed and well-nourished.   Obese   HENT:   Head: Normocephalic.   Nose: Nose normal.   Mouth/Throat: Oropharynx is clear and moist.   Eyes: Conjunctivae and EOM are normal. Pupils are equal, round, and reactive to light. Right eye exhibits no discharge. No scleral " icterus.   Neck: Normal range of motion. Neck supple. No JVD present. No thyromegaly present.   Cardiovascular: Normal rate, regular rhythm, normal heart sounds and intact distal pulses. Exam reveals no gallop and no friction rub.   No murmur heard.  Pulses:       Carotid pulses are 2+ on the right side, and 2+ on the left side.       Radial pulses are 2+ on the right side, and 2+ on the left side.        Femoral pulses are 2+ on the right side, and 2+ on the left side.       Popliteal pulses are 2+ on the right side, and 2+ on the left side.        Dorsalis pedis pulses are 2+ on the right side, and 2+ on the left side.        Posterior tibial pulses are 2+ on the right side, and 2+ on the left side.   Pulmonary/Chest: Effort normal and breath sounds normal. No respiratory distress. He has no wheezes. He has no rales.   Abdominal: Soft. Bowel sounds are normal. He exhibits no distension. There is no hepatosplenomegaly. There is no tenderness. There is no rebound.   Musculoskeletal: Normal range of motion. He exhibits no edema or tenderness.   Neurological: He is alert and oriented to person, place, and time.   Skin: Skin is warm and dry. No rash noted. No erythema.   Psychiatric: He has a normal mood and affect. His behavior is normal. Judgment and thought content normal.   Vitals reviewed.    Lab Review:     ECG 12 Lead  Date/Time: 3/21/2019 9:11 PM  Performed by: Giana Hughes MD  Authorized by: Giana Hughes MD   Comparison: compared with previous ECG   Similar to previous ECG  Rhythm: sinus rhythm    Clinical impression: normal ECG          Assessment:       Diagnosis Plan   1. Thoracic aortic aneurysm without rupture (CMS/HCC)  CT Chest Without Contrast   2. Coronary artery disease involving native coronary artery of native heart without angina pectoris     3. Essential hypertension     4. Chronic renal impairment, unspecified CKD stage     5. Type 2 diabetes mellitus with other circulatory complication, without  long-term current use of insulin (CMS/[facility])       Plan:       1.  Thoracic aortic aneurysm, stable on March 2018.  Check a CT scan of the chest without contrast  2.  Coronary artery disease noted by CT imaging.  Of stress test in May 2017 with no anginal symptoms  3.  Hypertension, controlled  4.  Renal insufficiency, by nephrology.  5.  Diabetes  6.  Mitral valve prolapse. Not well seen on the last echocardiogram was felt to be probably normal.  7.  Dyslipidemia  8.  Untreated sleep apnea  9.  Vascular screening. Normal screening 7/2017    Coronary Artery Disease  Assessment  • The patient has no angina    Plan  • Lifestyle modifications discussed include adhering to a heart healthy diet, maintenance of a healthy weight, regular exercise and regular monitoring of cholesterol and blood pressure    Subjective - Objective  • Current antiplatelet therapy includes aspirin 81 mg           Your medication list           Accurate as of 3/21/19 11:59 PM. If you have any questions, ask your nurse or doctor.               CHANGE how you take these medications      Instructions Last Dose Given Next Dose Due   metoprolol succinate XL 50 MG 24 hr tablet  Commonly known as:  TOPROL-XL  What changed:  Another medication with the same name was removed. Continue taking this medication, and follow the directions you see here.  Changed by:  Giana Hughes MD      Take 50 mg by mouth Daily.          CONTINUE taking these medications      Instructions Last Dose Given Next Dose Due   aspirin 81 MG EC tablet      Take 81 mg by mouth Daily.       ATORVASTATIN CALCIUM PO      Take 40 mg by mouth every night at bedtime.       fish oil 1000 MG capsule capsule      Take  by mouth Daily With Breakfast.       fluticasone 50 MCG/ACT nasal spray  Commonly known as:  FLONASE      2 sprays into the nostril(s) as directed by provider Daily.       metFORMIN 500 MG tablet  Commonly known as:  GLUCOPHAGE      Take 500 mg by mouth Daily With Breakfast.        MULTI VITAMIN PO      Take  by mouth Daily.       predniSONE 50 MG tablet  Commonly known as:  DELTASONE      Take one tab by mouth daily for 5 days.       promethazine-dextromethorphan 6.25-15 MG/5ML syrup  Commonly known as:  PROMETHAZINE-DM      Take 5 mL by mouth 4 (Four) Times a Day As Needed for Cough.          STOP taking these medications    lisinopril 5 MG tablet  Commonly known as:  PRINIVIL,ZESTRIL  Stopped by:  Giana Hughes MD             Dictated utilizing Dragon dictation

## 2019-03-26 ENCOUNTER — TELEPHONE (OUTPATIENT)
Dept: CARDIOLOGY | Facility: CLINIC | Age: 79
End: 2019-03-26

## 2019-03-26 NOTE — TELEPHONE ENCOUNTER
----- Message from Janie Walton sent at 3/26/2019  9:40 AM EDT -----  3/26 Good afternoon Dr Hughes, pt said he no longer needs CT of Chest here, [facility] and get [facility] you.    Thank you    Blaine OBRIEN

## 2019-05-13 ENCOUNTER — TRANSCRIBE ORDERS (OUTPATIENT)
Dept: ADMINISTRATIVE | Facility: HOSPITAL | Age: 79
End: 2019-05-13

## 2019-05-13 DIAGNOSIS — R91.8 PULMONARY INFILTRATES: Primary | ICD-10-CM

## 2019-08-15 ENCOUNTER — TRANSCRIBE ORDERS (OUTPATIENT)
Dept: ADMINISTRATIVE | Facility: HOSPITAL | Age: 79
End: 2019-08-15

## 2019-08-15 DIAGNOSIS — R91.8 PULMONARY INFILTRATE: Primary | ICD-10-CM

## 2019-08-23 ENCOUNTER — APPOINTMENT (OUTPATIENT)
Dept: GENERAL RADIOLOGY | Facility: HOSPITAL | Age: 79
End: 2019-08-23

## 2019-08-23 PROCEDURE — 71046 X-RAY EXAM CHEST 2 VIEWS: CPT | Performed by: GENERAL PRACTICE

## 2019-11-08 ENCOUNTER — TELEPHONE (OUTPATIENT)
Dept: CARDIOLOGY | Facility: CLINIC | Age: 79
End: 2019-11-08

## 2019-11-08 NOTE — TELEPHONE ENCOUNTER
I have the copy of the report but unable to scan in the chart at the moment.  I placed this in the INBOX until I can scan.

## 2019-11-08 NOTE — TELEPHONE ENCOUNTER
11/8/19  Patient left [facility] - states he [facility] and a report with it and asked if Dr. Hughes has reviewed it yet.  His ph [phone]/dinora

## 2019-11-11 NOTE — TELEPHONE ENCOUNTER
Pt called wanting to know if you saw the report and if he needed to do anything?  Please advise.

## 2019-11-13 ENCOUNTER — HOSPITAL ENCOUNTER (OUTPATIENT)
Dept: CT IMAGING | Facility: HOSPITAL | Age: 79
Discharge: HOME | End: 2019-11-13
Admitting: INTERNAL MEDICINE

## 2019-11-13 DIAGNOSIS — R91.8 PULMONARY INFILTRATE: ICD-10-CM

## 2019-11-13 PROCEDURE — 71250 CT THORAX DX C-: CPT

## 2019-11-15 NOTE — TELEPHONE ENCOUNTER
Reviewed CTA.  Of interest pulmonary scarring and mucous plugging present.  A sending aorta measuring 4.0 cm (minimally larger than prior study of 3.9).  Coronary artery disease noted.  I called the patient at home and got voicemail.  Left message to call back.    Please let the patient know that the aneurysm overall fairly unchanged with only 0.1 difference from last study on this noncontrast study.  Also noted is coronary artery disease which is been present before and he is well aware of.  Overall no change in plan, control blood pressure and follow-up as scheduled

## 2019-12-31 ENCOUNTER — TRANSCRIBE ORDERS (OUTPATIENT)
Dept: ADMINISTRATIVE | Facility: HOSPITAL | Age: 79
End: 2019-12-31

## 2019-12-31 DIAGNOSIS — R91.8 PULMONARY INFILTRATE: Primary | ICD-10-CM

## 2020-04-22 NOTE — PROGRESS NOTES
Date of Office Visit: 2020  Encounter Provider: Giana Hughes MD  Place of Service: [facility] CARDIOLOGY  Patient Name: Steve Mace  :1940    This patient has consented to a telehealth visit via phone. The visit was scheduled as a telephone health video visit to comply with patient safety concerns in accordance with CDC recommendations.  All vitals recorded within this visit are reported by the patient.  I spent  25minutes in total including but not limited to the 11 minutes spent in direct conversation with this patient.     Chief complaint  Follow-up of thoracic aortic aneurysm, transient cardiomyopathy, CAD and hypertension    History of Present Illness  Patient is a pleasant 79-year-old gentleman with diabetes, hypertension, hyperlipidemia.  In  he was seen for atypical chest pain.  A CT scan of his chest revealed a large pericardial effusion which was treated with nonsteroidals and resolution of his symptoms and effusion.  Echocardiogram revealed left ventricular dilatation with an ejection fraction of 45-50%, no significant valvular heart disease and aortic root size of 4.3 cm.  He had a PET scan that was negative for ischemia.  By  with medical therapy his ejection fraction normalized to 50%.  He was noted to have mitral valve prolapse without regurgitation.  He also noted to have multivessel coronary artery disease by coronary CT angiography.  In May 2017 he had an echocardiogram revealed an ejection fraction of 60% with an aortic root measured at 4.0 cm.  Ascending aorta was measured 3.7 cm.  Aortic valve is trileaflet and functioning normally.  He had a stress perfusion study that was negative for ischemia.  The right ventricle was enlarged though not noted by echocardiography.  CT angiogram of his chest in 2019 with asending aorta was felt to be mildly dilated and unchanged and measuring 3.9 cm with mild coronary artery calcification. CT in  11/2019 but no aortic size mentioned and scheduled for another CT in 5/2020    Since last visit he is golfing fairly frequently also walking up to 1-1/2 miles at a time.  Blood pressures been in the 120s over 70s when checked at various office visits it has not been checked at home lately.  [facility] was increased.  He had pneumonia and had a CT scan and November without contrast that there was no mention of the aortic size.  Is scheduled for another one in the next several weeks.  Pulmonary lesions are being managed by Dr. Rinaldi.. He has had no chest pain or palpitations or edema.    Past Medical History:   Diagnosis Date   • Acute myopericarditis    • Chronic renal insufficiency    • Coronary artery disease    • Diabetes (CMS/HCC)    • Hyperlipidemia    • Hypertension    • Mitral valve prolapse    • Prostate cancer (CMS/HCC)    • Sleep apnea    • Thoracic aortic aneurysm without rupture (CMS/HCC)      Past Surgical History:   Procedure Laterality Date   • PROSTATE SURGERY       Outpatient Medications Prior to Visit   Medication Sig Dispense Refill   • aspirin 81 MG EC tablet Take 81 mg by mouth Daily.     • ATORVASTATIN CALCIUM PO Take 40 mg by mouth every night at bedtime.     • fluticasone (FLONASE) 50 MCG/ACT nasal spray 2 sprays into the nostril(s) as directed by provider Daily.     • metFORMIN (GLUCOPHAGE) 500 MG tablet Take 1,000 mg by mouth 2 (Two) Times a Day With Meals.     • metoprolol succinate XL (TOPROL-XL) 50 MG 24 hr tablet Take 50 mg by mouth Daily.     • Multiple Vitamin (MULTI VITAMIN PO) Take  by mouth Daily.     • Omega-3 Fatty Acids (FISH OIL) 1000 MG capsule capsule Take 2,000 mg by mouth Daily With Breakfast.     • SYMBICORT 160-4.5 MCG/ACT inhaler INHALE TWO PUFFS BID UTD     • TURMERIC PO Take  by mouth Daily.     • predniSONE (DELTASONE) 20 MG tablet Take 1 tab TID x 3 days, 1 tab BID x 3 days, 1 tab QD x 2 days. 17 tablet 0   • promethazine-codeine  "(PHENERGAN with CODEINE) 6.25-10 MG/5ML syrup Take 5 mL by mouth Every 6 (Six) Hours As Needed for Cough. 150 mL 0     No facility-administered medications prior to visit.        Allergies as of 2020   • (No Known Allergies)     Social History     Socioeconomic History   • Marital status:      Spouse name: Not on file   • Number of children: Not on file   • Years of education: Not on file   • Highest education level: Not on file   Tobacco Use   • Smoking status: Former Smoker     Last attempt to quit: 10/18/1996     Years since quittin.5   • Smokeless tobacco: Never Used   Substance and Sexual Activity   • Alcohol use: Yes     Comment: occ/  Daily caffeine use   • Drug use: No   • Sexual activity: Defer   Lifestyle   • Physical activity:     Days per week: 4 days     Minutes per session: 60 min   • Stress: Not on file     Family History   Problem Relation Age of Onset   • Heart attack Father    • Diabetes Father    • Heart disease Brother    • Heart attack Brother    • Diabetes Other      Review of Systems   Cardiovascular: Positive for dyspnea on exertion. Negative for chest pain, leg swelling, palpitations and syncope.        Objective:     Vitals:    20 0859   Weight: 104 kg (230 lb)   Height: 177.8 cm (70\")     Body mass index is 33 kg/m².    Physical Exam: Telehealth visit physical exam not performed    Assessment:       Diagnosis Plan   1. Thoracic aortic aneurysm without rupture (CMS/HCC)     2. Essential hypertension     3. Coronary artery disease involving native coronary artery of native heart without angina pectoris     4. Type 2 diabetes mellitus with other circulatory complication, without long-term current use of insulin (CMS/HCC)     5. Chronic renal impairment, unspecified CKD stage       Plan:       1.  Thoracic aortic aneurysm, stable on 2018.  We will see if radiology can review prior study.  Excised and will asked him to comment on this on the upcoming study  2.  " Coronary artery disease noted by CT imaging.  Of stress test in May 2017 with no anginal symptoms  3.  Hypertension, controlled  4.  Renal insufficiency, by nephrology. [facility]2020  5.  Diabetes  6.  Mitral valve prolapse. Not well seen on the last echocardiogram 2017 was felt to be probably normal.  7.  Dyslipidemia  8.  Untreated sleep apnea  9.  Vascular screening. Normal screening 7/2017  10. Pulmonary nodule. Dr Rinaldi          Your medication list           Accurate as of April 23, 2020 11:59 PM. If you have any questions, ask your nurse or doctor.               CONTINUE taking these medications      Instructions Last Dose Given Next Dose Due   aspirin 81 MG EC tablet      Take 81 mg by mouth Daily.       ATORVASTATIN CALCIUM PO      Take 40 mg by mouth every night at bedtime.       fish oil 1000 MG capsule capsule      Take 2,000 mg by mouth Daily With Breakfast.       fluticasone 50 MCG/ACT nasal spray  Commonly known as:  FLONASE      2 sprays into the nostril(s) as directed by provider Daily.       metFORMIN 500 MG tablet  Commonly known as:  GLUCOPHAGE      Take 1,000 mg by mouth 2 (Two) Times a Day With Meals.       metoprolol succinate XL 50 MG 24 hr tablet  Commonly known as:  TOPROL-XL      Take 50 mg by mouth Daily.       MULTI VITAMIN PO      Take  by mouth Daily.       Symbicort 160-4.5 MCG/ACT inhaler  Generic drug:  budesonide-formoterol      INHALE TWO PUFFS BID UTD       TURMERIC PO      Take  by mouth Daily.          STOP taking these medications    predniSONE 20 MG tablet  Commonly known as:  DELTASONE  Stopped by:  Giana Hughes MD        promethazine-codeine 6.25-10 MG/5ML syrup  Commonly known as:  PHENERGAN with CODEINE  Stopped by:  Giana Hughes MD               Patient is no longer taking -.  I corrected the med list to reflect this.  I did not stop these medications.    Dictated utilizing Dragon dictation

## 2020-04-23 ENCOUNTER — TELEPHONE (OUTPATIENT)
Dept: CARDIOLOGY | Facility: CLINIC | Age: 80
End: 2020-04-23

## 2020-04-23 ENCOUNTER — TELEMEDICINE (OUTPATIENT)
Dept: CARDIOLOGY | Facility: CLINIC | Age: 80
End: 2020-04-23

## 2020-04-23 VITALS — HEIGHT: 70 IN | WEIGHT: 230 LBS | BODY MASS INDEX: 32.93 KG/M2

## 2020-04-23 DIAGNOSIS — I71.20 THORACIC AORTIC ANEURYSM WITHOUT RUPTURE (HCC): Primary | ICD-10-CM

## 2020-04-23 DIAGNOSIS — N18.9 CHRONIC RENAL IMPAIRMENT, UNSPECIFIED CKD STAGE: ICD-10-CM

## 2020-04-23 DIAGNOSIS — I10 ESSENTIAL HYPERTENSION: ICD-10-CM

## 2020-04-23 DIAGNOSIS — E11.59 TYPE 2 DIABETES MELLITUS WITH OTHER CIRCULATORY COMPLICATION, WITHOUT LONG-TERM CURRENT USE OF INSULIN (HCC): ICD-10-CM

## 2020-04-23 DIAGNOSIS — I25.10 CORONARY ARTERY DISEASE INVOLVING NATIVE CORONARY ARTERY OF NATIVE HEART WITHOUT ANGINA PECTORIS: ICD-10-CM

## 2020-04-23 PROCEDURE — 99214 OFFICE O/P EST MOD 30 MIN: CPT | Performed by: INTERNAL MEDICINE

## 2020-04-23 RX ORDER — BUDESONIDE AND FORMOTEROL FUMARATE DIHYDRATE 160; 4.5 UG/1; UG/1
2 AEROSOL RESPIRATORY (INHALATION)
COMMUNITY
Start: 2020-04-05

## 2020-05-04 NOTE — TELEPHONE ENCOUNTER
Please let the patient know that the aneurysm sizes 3.9 cm unchanged from prior studies even dating back to 2010

## 2020-05-04 NOTE — TELEPHONE ENCOUNTER
Spoke with pt.  Verbalized understanding.  No other issues or questions.  He will remain keeping an eye on his BP.  (done)

## 2020-05-19 ENCOUNTER — HOSPITAL ENCOUNTER (OUTPATIENT)
Dept: CT IMAGING | Facility: HOSPITAL | Age: 80
Discharge: HOME | End: 2020-05-19
Admitting: INTERNAL MEDICINE

## 2020-05-19 DIAGNOSIS — R91.8 PULMONARY INFILTRATE: ICD-10-CM

## 2020-05-19 PROCEDURE — 71250 CT THORAX DX C-: CPT

## 2020-11-11 ENCOUNTER — OFFICE VISIT (OUTPATIENT)
Dept: CARDIOLOGY | Facility: CLINIC | Age: 80
End: 2020-11-11

## 2020-11-11 VITALS
BODY MASS INDEX: 32.61 KG/M2 | DIASTOLIC BLOOD PRESSURE: 68 MMHG | HEART RATE: 68 BPM | WEIGHT: 227.8 LBS | SYSTOLIC BLOOD PRESSURE: 120 MMHG | HEIGHT: 70 IN

## 2020-11-11 DIAGNOSIS — I71.20 THORACIC AORTIC ANEURYSM WITHOUT RUPTURE (HCC): ICD-10-CM

## 2020-11-11 DIAGNOSIS — I10 ESSENTIAL HYPERTENSION: ICD-10-CM

## 2020-11-11 DIAGNOSIS — I25.10 CORONARY ARTERY DISEASE INVOLVING NATIVE CORONARY ARTERY OF NATIVE HEART WITHOUT ANGINA PECTORIS: Primary | ICD-10-CM

## 2020-11-11 PROCEDURE — 99214 OFFICE O/P EST MOD 30 MIN: CPT | Performed by: NURSE PRACTITIONER

## 2020-11-11 PROCEDURE — 93000 ELECTROCARDIOGRAM COMPLETE: CPT | Performed by: NURSE PRACTITIONER

## 2020-11-11 RX ORDER — UBIDECARENONE 100 MG
100 CAPSULE ORAL NIGHTLY
COMMUNITY
End: 2021-10-23 | Stop reason: HOSPADM

## 2020-11-11 NOTE — PROGRESS NOTES
Date of Office Visit: 2020  Encounter Provider: Coral Gonzales, LAURO, APRN  Place of Service: [facility]  Patient Name: Steve Mace  :1940        Subjective:     Chief Complaint:  Follow-up, hypertension, thoracic aortic dilation, coronary artery disease per CT scan      History of Present Illness:  Steve Mace is a 80 y.o. male patient of Dr. Hughes.  This is my first time seeing this patient in the office today and I have reviewed his record.    Patient has a history of thoracic aortic aneurysm, coronary artery disease, hypertension, renal insufficiency, diabetes, mitral valve prolapse, dyslipidemia, sleep apnea, pulmonary nodule.    In  patient was seen for atypical chest discomfort.  CT scan of the chest showed large pericardial effusion which was treated with nonsteroidals with resolution of symptoms and effusion.  Echo showed left ventricular dilation with EF of 45 to 50%, no significant valvular disease, aortic root 4.3 cm.  PET stress test was negative for ischemia.  By  EF had normalized to 50%.  He was noted to have mitral valve prolapse without regurgitation.  He was noted to have multivessel coronary disease by CT angiography.  May 2017 he had echo showing normal EF of 60%, aortic root of 4.0, ascending aorta of 3.7 cm, aortic valve is trileaflet and functioning normally.  Stress perfusion study was negative for ischemia.  RV appeared large on stress test however was not noted to be large on echo.  CTA of the chest 3/2019 showed mildly dilated ascending aorta unchanged measuring 3.9 cm with mild coronary artery calcifications.  CT 2019 did not mention aortic size.  CT scan 2020 showed stable mild ectasia of thoracic aorta.      Patient presents to office today for follow-up appointment.  Patient reports he is doing well since last visit.  He is walking at least 3 days a week, usually while playing golf.  He denies any exertional  symptoms or concerns.  No chest pain or discomfort, shortness of breath, shortness of breath with exertion, palpitations, racing heartbeat sensation, lower extremity edema, dizziness, syncope, falls, fatigue, or abnormal bleeding.  Blood pressure and heart rate well controlled in office today.  Does sometimes see blood pressure in the 130s at home but is not sitting before checking.  [facility] is [facility]  He sees Dr. Rinaldi yearly for follow-up on pulmonary nodule.        Past Medical History:   Diagnosis Date   • Acute myopericarditis    • Chronic renal insufficiency    • Coronary artery disease    • Diabetes (CMS/HCC)    • Hyperlipidemia    • Hypertension    • Mitral valve prolapse    • Prostate cancer (CMS/HCC)    • Sleep apnea    • Thoracic aortic aneurysm without rupture (CMS/HCC)      Past Surgical History:   Procedure Laterality Date   • PROSTATE SURGERY       Outpatient Medications Prior to Visit   Medication Sig Dispense Refill   • aspirin 81 MG EC tablet Take 81 mg by mouth Daily.     • ATORVASTATIN CALCIUM PO Take 40 mg by mouth every night at bedtime.     • coenzyme Q10 100 MG capsule Take 100 mg by mouth Daily.     • fluticasone (FLONASE) 50 MCG/ACT nasal spray 2 sprays into the nostril(s) as directed by provider Daily.     • metFORMIN (GLUCOPHAGE) 500 MG tablet Take 2,000 mg by mouth 2 (Two) Times a Day With Meals.     • metoprolol succinate XL (TOPROL-XL) 50 MG 24 hr tablet Take 50 mg by mouth Daily.     • Multiple Vitamin (MULTI VITAMIN PO) Take  by mouth Daily.     • Omega-3 Fatty Acids (FISH OIL) 1000 MG capsule capsule Take 2,000 mg by mouth Daily With Breakfast.     • SYMBICORT 160-4.5 MCG/ACT inhaler INHALE TWO PUFFS BID UTD     • TURMERIC PO Take  by mouth Daily.       No facility-administered medications prior to visit.        Allergies as of 11/11/2020   • (No Known Allergies)     Social History     Socioeconomic History   • Marital status:      Spouse name:  "Not on file   • Number of children: Not on file   • Years of education: Not on file   • Highest education level: Not on file   Tobacco Use   • Smoking status: Former Smoker     Quit date: 10/18/1996     Years since quittin.0   • Smokeless tobacco: Never Used   Substance and Sexual Activity   • Alcohol use: Yes     Comment: occ/  Daily caffeine use   • Drug use: No   • Sexual activity: Defer   Lifestyle   • Physical activity     Days per week: 4 days     Minutes per session: 60 min   • Stress: Not on file     Family History   Problem Relation Age of Onset   • Heart attack Father    • Diabetes Father    • Heart disease Brother    • Heart attack Brother    • Diabetes Other        Review of Systems   Constitution: Negative for chills, fever, malaise/fatigue, weight gain and weight loss.   HENT: Negative for ear pain, hearing loss, nosebleeds and sore throat.    Eyes: Negative for blurred vision, double vision, redness, vision loss in left eye, vision loss in right eye and visual disturbance.   Cardiovascular:        SEE HPI   Respiratory: Negative for cough, shortness of breath, snoring and wheezing.    Endocrine: Negative for cold intolerance and heat intolerance.   Hematologic/Lymphatic: Negative for bleeding problem.   Skin: Negative for itching, rash and suspicious lesions.   Musculoskeletal: Positive for joint pain (\"knee\"). Negative for falls, joint swelling and myalgias.   Gastrointestinal: Negative for abdominal pain, diarrhea, hematemesis, melena, nausea and vomiting.   Genitourinary: Negative for dysuria, frequency and hematuria.   Neurological: Negative for dizziness, headaches, numbness, paresthesias and seizures.   Psychiatric/Behavioral: Negative for altered mental status and depression. The patient is not nervous/anxious.           Objective:     Vitals:    20 0841   BP: 120/68   BP Location: Right arm   Cuff Size: Adult   Pulse: 68   Weight: 103 kg (227 lb 12.8 oz)   Height: 177.8 cm (70\") "     Body mass index is 32.69 kg/m².      PHYSICAL EXAM:  Constitutional:       General: Not in acute distress.     Appearance: Well-developed. Not diaphoretic.   Eyes:      Pupils: Pupils are equal, round, and reactive to light.   HENT:      Head: Normocephalic and atraumatic.   Neck:      Musculoskeletal: Neck supple.      Vascular: No carotid bruit or JVD.   Pulmonary:      Effort: Pulmonary effort is normal. No respiratory distress.      Breath sounds: Normal breath sounds. No wheezing. No rales.   Cardiovascular:      Normal rate. Regular rhythm.      Murmurs: There is no murmur.      No gallop. No click. No rub.   Pulses:     Intact distal pulses.   Edema:     Peripheral edema absent.   Abdominal:      General: Bowel sounds are normal. There is no distension.      Palpations: Abdomen is soft.   Musculoskeletal: Normal range of motion.         General: No tenderness or deformity.   Skin:     General: Skin is warm and dry.      Findings: No erythema or rash.   Neurological:      Mental Status: Alert and oriented to person, place, and time.   Psychiatric:         Behavior: Behavior normal.         Judgment: Judgment normal.             ECG 12 Lead    Date/Time: 11/11/2020 9:28 AM  Performed by: Coral Gonzales DNP, APRN  Authorized by: Coral Gonzales DNP, CRYS   Comparison: compared with previous ECG from 3/21/2019  Rhythm: sinus rhythm  Rate: normal  BPM: 68  Comments: No significant changes from previous EKG              Assessment:       Diagnosis Plan   1. Coronary artery disease involving native coronary artery of native heart without angina pectoris     2. Thoracic aortic aneurysm without rupture (CMS/HCC)     3. Essential hypertension           Plan:     1. Thoracic aortic aneurysm: Stable on 5/2020 CT chest.  Will look at reevaluating this around 5/2020.  Patient to keep April follow-up with Dr. Hughes to discuss further or call sooner for symptoms or concerns.  2. Coronary artery disease: Noted by CT  imaging.  Normal stress test 5/2017.  Denies anginal symptoms.  On aspirin 81 mg daily and did recommend getting the enteric-coated version taking on a full stomach.  No issues with this so far.  We discussed repeating a stress test however no symptoms at this time.  Will consider next visit.  Recommended continuing to stay active with regular exercise routine and notify office right away for any exertional symptoms or concerns or changes in stamina.  We discussed importance of staying active in the winter as well including possible treadmill, exercise bike, aerobics DVD when the weather is poor.  3. Hypertension: Blood pressure well controlled in the office today.  Patient continue to monitor and call if staying greater than 130/80 on average.  Did recommend checking at home at least 1 to 2 hours after morning medications after sitting calmly 10 to 15 minutes and if blood pressure greater than 130/80.    4. Dyslipidemia: Managed by outside provider.  LDL goal less than 70.  He reports that he is up-to-date on having his cholesterol checked but does not have a copy of it with him today.  On statin therapy.  5. Diabetes: Managed by outside provider.  Recent A1c 7%.  6. Mitral valve prolapse: Not seen on last echo in 2017.  Was felt to probably be normal.  7. Renal insufficiency: Follows with nephrology  8. Obstructive sleep apnea: On CPAP therapy.  [facility]  9. Normal vascular screening 7/2017.  Will look at repeating vascular screening next visit, per patient preference.    Patient to keep April follow-up with Dr. Hughes as scheduled or follow-up sooner if needed for any new, recurrent, or worsening symptoms or other issues or concerns.  Discussed in detail signs/symptoms that warrant sooner call or follow-up with office.           Your medication list          Accurate as of November 11, 2020  9:29 AM. If you have any questions, ask your nurse or doctor.            CONTINUE taking these  medications      Instructions Last Dose Given Next Dose Due   aspirin 81 MG EC tablet      Take 81 mg by mouth Daily.       ATORVASTATIN CALCIUM PO      Take 40 mg by mouth every night at bedtime.       coenzyme Q10 100 MG capsule      Take 100 mg by mouth Daily.       fish oil 1000 MG capsule capsule      Take 2,000 mg by mouth Daily With Breakfast.       fluticasone 50 MCG/ACT nasal spray  Commonly known as: FLONASE      2 sprays into the nostril(s) as directed by provider Daily.       metFORMIN 500 MG tablet  Commonly known as: GLUCOPHAGE      Take 2,000 mg by mouth 2 (Two) Times a Day With Meals.       metoprolol succinate XL 50 MG 24 hr tablet  Commonly known as: TOPROL-XL      Take 50 mg by mouth Daily.       multivitamin tablet tablet  Commonly known as: THERAGRAN      Take  by mouth Daily.       Symbicort 160-4.5 MCG/ACT inhaler  Generic drug: budesonide-formoterol      INHALE TWO PUFFS BID UTD       TURMERIC PO      Take  by mouth Daily.            I did not stop or change the above medications.  Patient's medication list was updated to reflect medications they are currently taking including medication changes made by other providers.            Thanks,    Coral Gonzales, DNP, APRN  11/11/2020         Dictated utilizing Dragon dictation

## 2021-03-15 ENCOUNTER — BULK ORDERING (OUTPATIENT)
Dept: CASE MANAGEMENT | Facility: OTHER | Age: 81
End: 2021-03-15

## 2021-03-15 DIAGNOSIS — Z23 IMMUNIZATION DUE: ICD-10-CM

## 2021-04-15 ENCOUNTER — OFFICE VISIT (OUTPATIENT)
Dept: ORTHOPEDIC SURGERY | Facility: CLINIC | Age: 81
End: 2021-04-15

## 2021-04-15 VITALS — WEIGHT: 225 LBS | HEIGHT: 70 IN | BODY MASS INDEX: 32.21 KG/M2 | TEMPERATURE: 98 F

## 2021-04-15 DIAGNOSIS — R52 PAIN: Primary | ICD-10-CM

## 2021-04-15 DIAGNOSIS — M17.0 PRIMARY OSTEOARTHRITIS OF KNEES, BILATERAL: ICD-10-CM

## 2021-04-15 PROCEDURE — 99213 OFFICE O/P EST LOW 20 MIN: CPT | Performed by: NURSE PRACTITIONER

## 2021-04-15 PROCEDURE — 73562 X-RAY EXAM OF KNEE 3: CPT | Performed by: NURSE PRACTITIONER

## 2021-04-15 RX ORDER — LOSARTAN POTASSIUM 50 MG/1
50 TABLET ORAL DAILY
COMMUNITY
Start: 2021-02-08

## 2021-04-15 RX ORDER — METOPROLOL TARTRATE 50 MG/1
25 TABLET, FILM COATED ORAL DAILY
COMMUNITY
Start: 2021-02-08

## 2021-04-15 NOTE — PROGRESS NOTES
Patient: Steve Mace  YOB: 1940 80 y.o. male  Medical Record Number: [id number]    Chief Complaints:   Chief Complaint   Patient presents with   • Left Knee - Establish Care, Pain   • Right Knee - Establish Care, Pain       History of Present Illness:Steve Mace is a 80 y.o. male who presents with complaints of bilateral knee pain right greater than left.  Patient had been seen approximately 4-1/2 years ago for his right knee but has not been seen here since.  Patient reports he has had significant pain over the last 2 to 3 months after being more active and he has started playing more golf, patient describes a bilateral knee pain as a moderate sometimes severe intermittent ache worse with standing walking, better with rest.  He denies any recent injury.     Allergies: No Known Allergies    Medications:   Current Outpatient Medications   Medication Sig Dispense Refill   • ATORVASTATIN CALCIUM PO Take 40 mg by mouth every night at bedtime.     • losartan (COZAAR) 50 MG tablet Take 50 mg by mouth Daily.     • metFORMIN (GLUCOPHAGE) 1000 MG tablet Take 1,000 mg by mouth 2 (Two) Times a Day.     • metoprolol tartrate (LOPRESSOR) 50 MG tablet 25 mg Daily.     • Omega-3 Fatty Acids (FISH OIL) 1000 MG capsule capsule Take 2,000 mg by mouth Daily With Breakfast.     • aspirin 81 MG EC tablet Take 81 mg by mouth Daily.     • coenzyme Q10 100 MG capsule Take 100 mg by mouth Daily.     • fluticasone (FLONASE) 50 MCG/ACT nasal spray 2 sprays into the nostril(s) as directed by provider Daily.     • metFORMIN (GLUCOPHAGE) 500 MG tablet Take 2,000 mg by mouth 2 (Two) Times a Day With Meals.     • metoprolol succinate XL (TOPROL-XL) 50 MG 24 hr tablet Take 50 mg by mouth Daily.     • Multiple Vitamin (MULTI VITAMIN PO) Take  by mouth Daily.     • SYMBICORT 160-4.5 MCG/ACT inhaler INHALE TWO PUFFS BID UTD     • TURMERIC PO Take  by mouth Daily.       No current facility-administered medications for this visit.  "        The following portions of the patient's history were reviewed and updated as appropriate: allergies, current medications, past family history, past medical history, past social history, past surgical history and problem list.    Review of Systems:   A 14 point review of systems was performed. All systems negative except pertinent positives/negative listed in HPI above    Physical Exam:   Vitals:    04/15/21 0801   Temp: 98 °F (36.7 °C)   Weight: 102 kg (225 lb)   Height: 177.8 cm (70\")   PainSc:   5       General: A and O x 3, ASA, NAD    SCLERA:    Normal    DENTITION:   Normal  Skin clear no unusual lesions noted  Bilateral knees patient has trace amount of effusion noted with 120 degrees flexion neutral in extension with patellofemoral crepitus noted right greater than left knee positive Fernie negative Lockman calf soft and nontender       Radiology:  Xrays 3views (ap,lateral, sunrise) bilateral knees were ordered and reviewed today secondary to pain and show bone-on-bone end-stage osteoarthritis of the right knee moderate arthritic changes noted of the left.  Compared to views show definite progression in arthritic changes    Assessment/Plan: Osteoarthritis bilateral knees    Patient discussed options, we will proceed with bilateral knee cortisone injections, referred the patient to outpatient physical therapy, patient will continue with ibuprofen but will discuss this with his primary care physician to make sure kidney function test are appropriate, otherwise I will see him back in 3 months if needed for repeat injections      Mindy Bustos, APRN  4/15/2021  "

## 2021-05-03 ENCOUNTER — TREATMENT (OUTPATIENT)
Dept: PHYSICAL THERAPY | Facility: CLINIC | Age: 81
End: 2021-05-03

## 2021-05-03 DIAGNOSIS — Z74.09 IMPAIRED FUNCTIONAL MOBILITY, BALANCE, GAIT, AND ENDURANCE: Primary | ICD-10-CM

## 2021-05-03 DIAGNOSIS — M25.562 CHRONIC PAIN OF BOTH KNEES: ICD-10-CM

## 2021-05-03 DIAGNOSIS — M25.561 CHRONIC PAIN OF BOTH KNEES: ICD-10-CM

## 2021-05-03 DIAGNOSIS — G89.29 CHRONIC PAIN OF BOTH KNEES: ICD-10-CM

## 2021-05-03 PROCEDURE — 97162 PT EVAL MOD COMPLEX 30 MIN: CPT | Performed by: PHYSICAL THERAPIST

## 2021-05-03 PROCEDURE — 97110 THERAPEUTIC EXERCISES: CPT | Performed by: PHYSICAL THERAPIST

## 2021-05-03 PROCEDURE — 97530 THERAPEUTIC ACTIVITIES: CPT | Performed by: PHYSICAL THERAPIST

## 2021-05-03 NOTE — PROGRESS NOTES
Physical Therapy Initial Evaluation and Plan of Care    Patient: Steve Mace   : 1940  Diagnosis/ICD-10 Code:  Impaired functional mobility, balance, gait, and endurance [Z74.09]  Referring practitioner: CRYS Wallace    Subjective Evaluation    History of Present Illness  Mechanism of injury: Chronic history of bilateral knee pain.  Had synvisc injection left knee 15 years ago - great improvement.  Tried it in the right knee  - no relief, Had steroid injection in right 2021.  Had steroid shots in both knees last month - with minimal relief  No previous surgeries on the knees   Hs end stage knee OA  Plays golf TIW - walks if able, now using cart due to pain  Prior to covid went to gym - some light resisted exercises  Does yard work      Patient Occupation: Retired - salesman  Pain  Current pain ratin  At best pain ratin  At worst pain ratin  Location: Right knee medial jiont line> Left knee MJL, occasional cramping in right calf and inner thigh  Quality: discomfort, dull ache, sharp and knife-like  Aggravating factors: prolonged positioning, squatting, ambulation and stairs (downhill)  Progression: worsening    Social Support  Lives in: one-story house  Lives with: spouse    Diagnostic Tests  X-ray: abnormal (OA end stage)    Treatments  Previous treatment: injection treatment  Current treatment: physical therapy  Patient Goals  Patient goal: be able to walk a round a golf without pain           Objective          Observations     Additional Knee Observation Details  Genu valgus symmetrically   Symmetrical gait pattern    Palpation     Right   Hypertonic in the medial gastrocnemius. Tenderness of the medial gastrocnemius.     Tenderness   Left Knee   Tenderness in the medial joint line.     Right Knee   Tenderness in the medial joint line.     Neurological Testing     Sensation     Knee   Left Knee   Intact: light touch    Right Knee   Intact: light touch     Active Range of  Motion   Left Knee   Flexion: 130 degrees   Extension: 0 degrees   Extensor la degrees     Right Knee   Flexion: 132 degrees   Extension: 0 degrees   Extensor la degrees     Patellar Mobility   Left Knee Hypomobile in the left medial, left lateral and left superior patellar tendon(s).     Right Knee Hypomobile in the medial, lateral and superior patellar tendon(s).     Strength/Myotome Testing     Left Knee   Flexion: 4  Extension: 4+  Quadriceps contraction: good    Right Knee   Flexion: 4  Extension: 4+  Quadriceps contraction: good    Tests     Left Knee   Positive patellar compression and patella-femoral grind.   Negative valgus stress test at 0 degrees and varus stress test at 0 degrees.     Right Knee   Positive patellar compression and patella-femoral grind.   Negative valgus stress test at 0 degrees and varus stress test at 0 degrees.           Assessment & Plan     Assessment  Impairments: abnormal gait, abnormal muscle tone, abnormal or restricted ROM, activity intolerance, impaired balance, impaired physical strength, lacks appropriate home exercise program, pain with function and weight-bearing intolerance  Assessment details: 80 y.o. male with bilateral knee pain due to end stage OA presents with: 1. Intermittent knee pain, 2. Slightly decreased knee ROM, 3. Decreased strength and flexibility in lE's, 4. Varus knee positioning, 5. LEFS score of 60, 5. Decreased tolerance for prolonged WB and playing golf  Prognosis: good  Functional Limitations: walking, standing and stooping  Goals  Plan Goals: Short Term Goals: 2 weeks  Patient will be able to tolerate initial exercises  Patient will have pain <5/10  Patient will be able to stand/walk for >15 minutes without pain  Patient will be able to resume light gym workouts without pain    Long Term Goals: 4 weeks  Patient will be independent in performing home exercise program.  Patient will have functional pain free knee AROM  Patient will be able to  climb up/down inclines without increased pain  Patient will be able to play a round of golf without pain    Plan  Therapy options: will be seen for skilled physical therapy services  Planned modality interventions: cryotherapy  Planned therapy interventions: manual therapy, strengthening, stretching, home exercise program and joint mobilization  Frequency: 2x week  Duration in visits: 8  Duration in weeks: 4  Treatment plan discussed with: patient  Plan details: Patient issued written HEP of exercises performed in clinic today        Manual Therapy:    0     mins  84396;  Therapeutic Exercise:    30     mins  07087;     Neuromuscular Mele:    0    mins  78127;    Therapeutic Activity:     10     mins  42637;   Rehab process, knee anatomy review, flexibility    Evaluation Time:     20  mins  Timed Treatment:   40   mins   Total Treatment:     60   mins    PT SIGNATURE: Michelle Steven, PT   DATE TREATMENT INITIATED: 5/4/2021    Initial Certification  Certification Period: 8/2/2021  I certify that the therapy services are furnished while this patient is under my care.  The services outlined above are required by this patient, and will be reviewed every 90 days.     PHYSICIAN: Mindy Bustos, APRHAMIDA      DATE:     Please sign and return via fax to [phone].. Thank you, [facility].

## 2021-05-10 ENCOUNTER — OFFICE VISIT (OUTPATIENT)
Dept: CARDIOLOGY | Facility: CLINIC | Age: 81
End: 2021-05-10

## 2021-05-10 VITALS
SYSTOLIC BLOOD PRESSURE: 130 MMHG | DIASTOLIC BLOOD PRESSURE: 80 MMHG | WEIGHT: 218 LBS | HEART RATE: 57 BPM | BODY MASS INDEX: 31.21 KG/M2 | HEIGHT: 70 IN

## 2021-05-10 DIAGNOSIS — I10 ESSENTIAL HYPERTENSION: ICD-10-CM

## 2021-05-10 DIAGNOSIS — I25.810 CORONARY ARTERY DISEASE INVOLVING CORONARY BYPASS GRAFT OF NATIVE HEART WITHOUT ANGINA PECTORIS: ICD-10-CM

## 2021-05-10 DIAGNOSIS — R07.2 PRECORDIAL PAIN: ICD-10-CM

## 2021-05-10 DIAGNOSIS — E11.59 TYPE 2 DIABETES MELLITUS WITH OTHER CIRCULATORY COMPLICATION, WITHOUT LONG-TERM CURRENT USE OF INSULIN (HCC): ICD-10-CM

## 2021-05-10 DIAGNOSIS — I71.20 THORACIC AORTIC ANEURYSM WITHOUT RUPTURE (HCC): Primary | ICD-10-CM

## 2021-05-10 PROCEDURE — 99214 OFFICE O/P EST MOD 30 MIN: CPT | Performed by: INTERNAL MEDICINE

## 2021-05-10 PROCEDURE — 93000 ELECTROCARDIOGRAM COMPLETE: CPT | Performed by: INTERNAL MEDICINE

## 2021-05-12 ENCOUNTER — TREATMENT (OUTPATIENT)
Dept: PHYSICAL THERAPY | Facility: CLINIC | Age: 81
End: 2021-05-12

## 2021-05-12 DIAGNOSIS — M25.562 CHRONIC PAIN OF BOTH KNEES: ICD-10-CM

## 2021-05-12 DIAGNOSIS — G89.29 CHRONIC PAIN OF BOTH KNEES: ICD-10-CM

## 2021-05-12 DIAGNOSIS — M25.561 CHRONIC PAIN OF BOTH KNEES: ICD-10-CM

## 2021-05-12 DIAGNOSIS — Z74.09 IMPAIRED FUNCTIONAL MOBILITY, BALANCE, GAIT, AND ENDURANCE: Primary | ICD-10-CM

## 2021-05-12 PROCEDURE — 97110 THERAPEUTIC EXERCISES: CPT | Performed by: PHYSICAL THERAPIST

## 2021-05-12 NOTE — PROGRESS NOTES
Physical Therapy Daily Progress Note    VISIT#: 2    Subjective   Steve Mace reports: that he has been doing his HEP but has questions about one of the exercises.  Reports soreness in knees but nothing sharp.      Objective   Varus gait pattern    Balance poor with standing 4 way hip    See Exercise, Manual, and Modality Logs for complete treatment.     Patient Education: Cueing for proper HEP technique    Assessment/Plan  Patient required considerable cueing for proper exercise technique but has been compliant in perfomring them.  Still having some cramping in the left hip adductor - relieved with seated stretch.    Progress strengthening /stabilization /functional activity           Manual Therapy:    0     mins  11916;  Therapeutic Exercise:    30/50     mins  14484;     Neuromuscular Mele:    0    mins  99468;    Therapeutic Activity:     0     mins  21218;     Dry Needling                  0_  mins    Timed Treatment:   30   mins   Total Treatment:     60   mins    Michelle Steven, PT  [place] License # [id number]  Physical Therapist

## 2021-05-19 ENCOUNTER — TREATMENT (OUTPATIENT)
Dept: PHYSICAL THERAPY | Facility: CLINIC | Age: 81
End: 2021-05-19

## 2021-05-19 DIAGNOSIS — Z74.09 IMPAIRED FUNCTIONAL MOBILITY, BALANCE, GAIT, AND ENDURANCE: Primary | ICD-10-CM

## 2021-05-19 DIAGNOSIS — M25.562 CHRONIC PAIN OF BOTH KNEES: ICD-10-CM

## 2021-05-19 DIAGNOSIS — M25.561 CHRONIC PAIN OF BOTH KNEES: ICD-10-CM

## 2021-05-19 DIAGNOSIS — G89.29 CHRONIC PAIN OF BOTH KNEES: ICD-10-CM

## 2021-05-19 PROCEDURE — 97140 MANUAL THERAPY 1/> REGIONS: CPT | Performed by: PHYSICAL THERAPIST

## 2021-05-19 PROCEDURE — 97112 NEUROMUSCULAR REEDUCATION: CPT | Performed by: PHYSICAL THERAPIST

## 2021-05-19 PROCEDURE — 97110 THERAPEUTIC EXERCISES: CPT | Performed by: PHYSICAL THERAPIST

## 2021-05-19 NOTE — PROGRESS NOTES
"Physical Therapy Daily Progress Note    VISIT#: 3    Subjective   Steve Mace reports: that his knee is feeling pretty good but still has some \"twinges\" in both medial and lateral knee when walking after finishing playing golf.       Objective   Hamstrings minimally tight bilaterally     See Exercise, Manual, and Modality Logs for complete treatment.     Patient Education: reason for balance exercises    Assessment/Plan  Steve is doing well with his rehab.  His motion has increased in the knees and the stability of the hips has improved.      Progress strengthening /stabilization /functional activity           Manual Therapy:    10     mins  67005;  Therapeutic Exercise:    20/35     mins  49907;     Neuromuscular Mele:    10    mins  12315;    Therapeutic Activity:     0     mins  18988;     Dry Needling                  0_  mins    Timed Treatment:   40   mins   Total Treatment:     60   mins    Michelle Steven, PT  [place] License # [id number]  Physical Therapist      "

## 2021-05-20 PROBLEM — I25.810 CORONARY ARTERY DISEASE INVOLVING CORONARY BYPASS GRAFT OF NATIVE HEART WITHOUT ANGINA PECTORIS: Status: ACTIVE | Noted: 2021-05-20

## 2021-05-20 PROBLEM — R07.2 PRECORDIAL PAIN: Status: ACTIVE | Noted: 2021-05-20

## 2021-05-26 ENCOUNTER — TREATMENT (OUTPATIENT)
Dept: PHYSICAL THERAPY | Facility: CLINIC | Age: 81
End: 2021-05-26

## 2021-05-26 DIAGNOSIS — M25.561 CHRONIC PAIN OF BOTH KNEES: ICD-10-CM

## 2021-05-26 DIAGNOSIS — M25.562 CHRONIC PAIN OF BOTH KNEES: ICD-10-CM

## 2021-05-26 DIAGNOSIS — G89.29 CHRONIC PAIN OF BOTH KNEES: ICD-10-CM

## 2021-05-26 DIAGNOSIS — Z74.09 IMPAIRED FUNCTIONAL MOBILITY, BALANCE, GAIT, AND ENDURANCE: Primary | ICD-10-CM

## 2021-05-26 PROCEDURE — 97110 THERAPEUTIC EXERCISES: CPT | Performed by: PHYSICAL THERAPIST

## 2021-05-26 PROCEDURE — 97112 NEUROMUSCULAR REEDUCATION: CPT | Performed by: PHYSICAL THERAPIST

## 2021-05-26 PROCEDURE — 97140 MANUAL THERAPY 1/> REGIONS: CPT | Performed by: PHYSICAL THERAPIST

## 2021-05-26 NOTE — PROGRESS NOTES
Physical Therapy Daily Progress Note/Discharge Summary    VISIT#: 4    Subjective   Steve Mace reports: that he overdid it last week as he played golf six days causing some increased right knee pain.  States that he is moving better and feels stronger. Still has pain with some twisting motions.      Objective   Heel slide - 2-130*    Minimal joint line tenderness     Strength - 5/5 in LE for age    See Exercise, Manual, and Modality Logs for complete treatment.     Patient Education: cont HEP    Assessment/Plan  Mr Mace has done very well with therapy.  He is independent in his HEP   He has functional ROM and normal strength.  All goals have been met.     DC PT at this time           Manual Therapy:    10     mins  57798;  Therapeutic Exercise:    15/30     mins  01578;     Neuromuscular Mele:    10    mins  72885;    Therapeutic Activity:     5     mins  69742;     Dry Needling                  0_  mins    Timed Treatment:   40   mins   Total Treatment:     65   mins    Michelle Steven, PT  [place] License # [id number]  Physical Therapist

## 2021-06-09 ENCOUNTER — HOSPITAL ENCOUNTER (OUTPATIENT)
Dept: CARDIOLOGY | Facility: HOSPITAL | Age: 81
Discharge: HOME | End: 2021-06-09
Admitting: INTERNAL MEDICINE

## 2021-06-09 VITALS
SYSTOLIC BLOOD PRESSURE: 130 MMHG | HEIGHT: 70 IN | DIASTOLIC BLOOD PRESSURE: 80 MMHG | WEIGHT: 218 LBS | BODY MASS INDEX: 31.21 KG/M2 | HEART RATE: 57 BPM

## 2021-06-09 DIAGNOSIS — I71.20 THORACIC AORTIC ANEURYSM WITHOUT RUPTURE (HCC): ICD-10-CM

## 2021-06-09 PROCEDURE — 93306 TTE W/DOPPLER COMPLETE: CPT | Performed by: INTERNAL MEDICINE

## 2021-06-09 PROCEDURE — 93306 TTE W/DOPPLER COMPLETE: CPT

## 2021-06-10 LAB
AORTIC ARCH: 2.4 CM
ASCENDING AORTA: 4 CM
BH CV ECHO MEAS - ACS: 2.3 CM
BH CV ECHO MEAS - AO MAX PG (FULL): 2.1 MMHG
BH CV ECHO MEAS - AO MAX PG: 5.5 MMHG
BH CV ECHO MEAS - AO MEAN PG (FULL): 1.1 MMHG
BH CV ECHO MEAS - AO MEAN PG: 3.1 MMHG
BH CV ECHO MEAS - AO ROOT AREA (BSA CORRECTED): 1.9
BH CV ECHO MEAS - AO ROOT AREA: 13.3 CM^2
BH CV ECHO MEAS - AO ROOT DIAM: 4.1 CM
BH CV ECHO MEAS - AO V2 MAX: 117.3 CM/SEC
BH CV ECHO MEAS - AO V2 MEAN: 83.8 CM/SEC
BH CV ECHO MEAS - AO V2 VTI: 24.8 CM
BH CV ECHO MEAS - ASC AORTA: 4 CM
BH CV ECHO MEAS - AVA(I,A): 2.8 CM^2
BH CV ECHO MEAS - AVA(I,D): 2.8 CM^2
BH CV ECHO MEAS - AVA(V,A): 2.7 CM^2
BH CV ECHO MEAS - AVA(V,D): 2.7 CM^2
BH CV ECHO MEAS - BSA(HAYCOCK): 2.2 M^2
BH CV ECHO MEAS - BSA: 2.2 M^2
BH CV ECHO MEAS - BZI_BMI: 31.3 KILOGRAMS/M^2
BH CV ECHO MEAS - BZI_METRIC_HEIGHT: 177.8 CM
BH CV ECHO MEAS - BZI_METRIC_WEIGHT: 98.9 KG
BH CV ECHO MEAS - EDV(MOD-SP2): 95 ML
BH CV ECHO MEAS - EDV(MOD-SP4): 63 ML
BH CV ECHO MEAS - EDV(TEICH): 103.3 ML
BH CV ECHO MEAS - EF(CUBED): 70.2 %
BH CV ECHO MEAS - EF(MOD-BP): 65.9 %
BH CV ECHO MEAS - EF(MOD-SP2): 68.4 %
BH CV ECHO MEAS - EF(MOD-SP4): 63.5 %
BH CV ECHO MEAS - EF(TEICH): 61.8 %
BH CV ECHO MEAS - ESV(MOD-SP2): 30 ML
BH CV ECHO MEAS - ESV(MOD-SP4): 23 ML
BH CV ECHO MEAS - ESV(TEICH): 39.5 ML
BH CV ECHO MEAS - FS: 33.2 %
BH CV ECHO MEAS - IVS/LVPW: 0.88
BH CV ECHO MEAS - IVSD: 1.1 CM
BH CV ECHO MEAS - LAT PEAK E' VEL: 11.4 CM/SEC
BH CV ECHO MEAS - LV DIASTOLIC VOL/BSA (35-75): 29.1 ML/M^2
BH CV ECHO MEAS - LV MASS(C)D: 205.1 GRAMS
BH CV ECHO MEAS - LV MASS(C)DI: 94.7 GRAMS/M^2
BH CV ECHO MEAS - LV MAX PG: 3.4 MMHG
BH CV ECHO MEAS - LV MEAN PG: 2 MMHG
BH CV ECHO MEAS - LV SYSTOLIC VOL/BSA (12-30): 10.6 ML/M^2
BH CV ECHO MEAS - LV V1 MAX: 92.6 CM/SEC
BH CV ECHO MEAS - LV V1 MEAN: 69.1 CM/SEC
BH CV ECHO MEAS - LV V1 VTI: 20.4 CM
BH CV ECHO MEAS - LVIDD: 4.7 CM
BH CV ECHO MEAS - LVIDS: 3.2 CM
BH CV ECHO MEAS - LVLD AP2: 7.5 CM
BH CV ECHO MEAS - LVLD AP4: 7.7 CM
BH CV ECHO MEAS - LVLS AP2: 6.5 CM
BH CV ECHO MEAS - LVLS AP4: 6.4 CM
BH CV ECHO MEAS - LVOT AREA (M): 3.5 CM^2
BH CV ECHO MEAS - LVOT AREA: 3.4 CM^2
BH CV ECHO MEAS - LVOT DIAM: 2.1 CM
BH CV ECHO MEAS - LVPWD: 1.2 CM
BH CV ECHO MEAS - MED PEAK E' VEL: 7.8 CM/SEC
BH CV ECHO MEAS - MV A DUR: 0.13 SEC
BH CV ECHO MEAS - MV A MAX VEL: 66.4 CM/SEC
BH CV ECHO MEAS - MV DEC SLOPE: 466.9 CM/SEC^2
BH CV ECHO MEAS - MV DEC TIME: 0.21 SEC
BH CV ECHO MEAS - MV E MAX VEL: 56.6 CM/SEC
BH CV ECHO MEAS - MV E/A: 0.85
BH CV ECHO MEAS - MV MAX PG: 2.3 MMHG
BH CV ECHO MEAS - MV MEAN PG: 0.94 MMHG
BH CV ECHO MEAS - MV P1/2T MAX VEL: 75.6 CM/SEC
BH CV ECHO MEAS - MV P1/2T: 47.4 MSEC
BH CV ECHO MEAS - MV V2 MAX: 75.6 CM/SEC
BH CV ECHO MEAS - MV V2 MEAN: 43.5 CM/SEC
BH CV ECHO MEAS - MV V2 VTI: 23.4 CM
BH CV ECHO MEAS - MVA P1/2T LCG: 2.9 CM^2
BH CV ECHO MEAS - MVA(P1/2T): 4.6 CM^2
BH CV ECHO MEAS - MVA(VTI): 2.9 CM^2
BH CV ECHO MEAS - PA ACC TIME: 0.14 SEC
BH CV ECHO MEAS - PA MAX PG (FULL): 2.5 MMHG
BH CV ECHO MEAS - PA MAX PG: 3.9 MMHG
BH CV ECHO MEAS - PA PR(ACCEL): 14.8 MMHG
BH CV ECHO MEAS - PA V2 MAX: 99.1 CM/SEC
BH CV ECHO MEAS - PULM A REVS DUR: 0.16 SEC
BH CV ECHO MEAS - PULM A REVS VEL: 37.1 CM/SEC
BH CV ECHO MEAS - PULM DIAS VEL: 45.7 CM/SEC
BH CV ECHO MEAS - PULM S/D: 1.5
BH CV ECHO MEAS - PULM SYS VEL: 66.6 CM/SEC
BH CV ECHO MEAS - PVA(V,A): 2.7 CM^2
BH CV ECHO MEAS - PVA(V,D): 2.7 CM^2
BH CV ECHO MEAS - QP/QS: 0.9
BH CV ECHO MEAS - RAP SYSTOLE: 3 MMHG
BH CV ECHO MEAS - RV MAX PG: 1.4 MMHG
BH CV ECHO MEAS - RV MEAN PG: 0.64 MMHG
BH CV ECHO MEAS - RV V1 MAX: 58.7 CM/SEC
BH CV ECHO MEAS - RV V1 MEAN: 37.1 CM/SEC
BH CV ECHO MEAS - RV V1 VTI: 13.6 CM
BH CV ECHO MEAS - RVOT AREA: 4.6 CM^2
BH CV ECHO MEAS - RVOT DIAM: 2.4 CM
BH CV ECHO MEAS - RVSP: 22 MMHG
BH CV ECHO MEAS - SI(AO): 152.4 ML/M^2
BH CV ECHO MEAS - SI(CUBED): 34.1 ML/M^2
BH CV ECHO MEAS - SI(LVOT): 31.7 ML/M^2
BH CV ECHO MEAS - SI(MOD-SP2): 30 ML/M^2
BH CV ECHO MEAS - SI(MOD-SP4): 18.5 ML/M^2
BH CV ECHO MEAS - SI(TEICH): 29.5 ML/M^2
BH CV ECHO MEAS - SUP REN AO DIAM: 2.3 CM
BH CV ECHO MEAS - SV(AO): 330 ML
BH CV ECHO MEAS - SV(CUBED): 73.8 ML
BH CV ECHO MEAS - SV(LVOT): 68.7 ML
BH CV ECHO MEAS - SV(MOD-SP2): 65 ML
BH CV ECHO MEAS - SV(MOD-SP4): 40 ML
BH CV ECHO MEAS - SV(RVOT): 62.2 ML
BH CV ECHO MEAS - SV(TEICH): 63.9 ML
BH CV ECHO MEAS - TAPSE (>1.6): 1.4 CM
BH CV ECHO MEAS - TR MAX VEL: 217.2 CM/SEC
BH CV ECHO MEASUREMENTS AVERAGE E/E' RATIO: 5.9
BH CV XLRA - RV BASE: 3 CM
BH CV XLRA - RV LENGTH: 6.8 CM
BH CV XLRA - RV MID: 2.5 CM
BH CV XLRA - TDI S': 14.5 CM/SEC
LEFT ATRIUM VOLUME INDEX: 14 ML/M2
LV EF 2D ECHO EST: 66 %
MAXIMAL PREDICTED HEART RATE: 141 BPM
SINUS: 3.3 CM
STJ: 3.2 CM
STRESS TARGET HR: 120 BPM

## 2021-06-16 ENCOUNTER — TELEPHONE (OUTPATIENT)
Dept: ORTHOPEDIC SURGERY | Facility: CLINIC | Age: 81
End: 2021-06-16

## 2021-06-16 ENCOUNTER — TELEPHONE (OUTPATIENT)
Dept: CARDIOLOGY | Facility: CLINIC | Age: 81
End: 2021-06-16

## 2021-06-16 NOTE — TELEPHONE ENCOUNTER
Please let the patient know that the echocardiogram shows a sending aorta measures about the same as last CT scan (3.9 versus 4 cm).  Plan on repeat CT scan next year

## 2021-06-16 NOTE — TELEPHONE ENCOUNTER
Caller: PATIENT    Relationship to patient: SELF    Best call back number:  [phone]     Chief complaint: RIGHT KNEE PAIN     Type of visit: WORK IN     Requested date: ASAP    If rescheduling, when is the original appointment:  07/23/21     Additional notes: PT. HAS APPT. ON 07/23/21 FOR FOLLOW U RIGHT KNEE PAIN, AND WANTS CORTISONE INJECTION.   ASKING IF YAM[facility]T CAN POSSIBLY WORK HIM IN ANY SOONER TO GET CORTISONE INJECTION.   PT. STATES THAT HE WILL BE GOING OUT OF TOWN FROM 06/22/21-06/28/21.   PLEASE CALL TO ADVISE.

## 2021-06-21 ENCOUNTER — OFFICE VISIT (OUTPATIENT)
Dept: ORTHOPEDIC SURGERY | Facility: CLINIC | Age: 81
End: 2021-06-21

## 2021-06-21 VITALS — TEMPERATURE: 97.5 F | BODY MASS INDEX: 31.67 KG/M2 | WEIGHT: 221.2 LBS | HEIGHT: 70 IN

## 2021-06-21 DIAGNOSIS — M17.11 PRIMARY OSTEOARTHRITIS OF RIGHT KNEE: Primary | ICD-10-CM

## 2021-06-21 PROCEDURE — 20610 DRAIN/INJ JOINT/BURSA W/O US: CPT | Performed by: NURSE PRACTITIONER

## 2021-06-21 RX ORDER — LIDOCAINE HYDROCHLORIDE 20 MG/ML
2 INJECTION, SOLUTION EPIDURAL; INFILTRATION; INTRACAUDAL; PERINEURAL
Status: COMPLETED | OUTPATIENT
Start: 2021-06-21 | End: 2021-06-21

## 2021-06-21 RX ORDER — METHYLPREDNISOLONE ACETATE 80 MG/ML
80 INJECTION, SUSPENSION INTRA-ARTICULAR; INTRALESIONAL; INTRAMUSCULAR; SOFT TISSUE
Status: COMPLETED | OUTPATIENT
Start: 2021-06-21 | End: 2021-06-21

## 2021-06-21 RX ADMIN — LIDOCAINE HYDROCHLORIDE 2 ML: 20 INJECTION, SOLUTION EPIDURAL; INFILTRATION; INTRACAUDAL; PERINEURAL at 10:58

## 2021-06-21 RX ADMIN — METHYLPREDNISOLONE ACETATE 80 MG: 80 INJECTION, SUSPENSION INTRA-ARTICULAR; INTRALESIONAL; INTRAMUSCULAR; SOFT TISSUE at 10:58

## 2021-06-21 NOTE — PROGRESS NOTES
6/21/2021    Steve Mace is here today for worsening knee pain. Pt has undergone injection of the knee in the past with good resolution of symptoms. Pt is requesting a repeat injection.     KNEE Injection Procedure Note:    Large Joint Arthrocentesis: R knee  Date/Time: 6/21/2021 10:58 AM  Consent given by: patient  Site marked: site marked  Timeout: Immediately prior to procedure a time out was called to verify the correct patient, procedure, equipment, support staff and site/side marked as required   Supporting Documentation  Indications: pain and joint swelling   Procedure Details  Location: knee - R knee  Preparation: Patient was prepped and draped in the usual sterile fashion  Needle size: 22 G  Approach: anterolateral  Medications administered: 80 mg methylPREDNISolone acetate 80 MG/ML; 2 mL lidocaine PF 2% 2 %  Patient tolerance: patient tolerated the procedure well with no immediate complications          At the conclusion of the injection I discussed the importance of continued quad strengthening exercises on a daily basis. I will see the patient back if the symptoms should fail to improve or worsen.    Mindy Bustos, APRN  6/21/2021

## 2021-08-05 ENCOUNTER — OFFICE VISIT (OUTPATIENT)
Dept: ORTHOPEDIC SURGERY | Facility: CLINIC | Age: 81
End: 2021-08-05

## 2021-08-05 VITALS — HEIGHT: 70 IN | TEMPERATURE: 98.6 F | BODY MASS INDEX: 31.64 KG/M2 | WEIGHT: 221 LBS

## 2021-08-05 DIAGNOSIS — M17.11 PRIMARY OSTEOARTHRITIS OF RIGHT KNEE: Primary | ICD-10-CM

## 2021-08-05 PROCEDURE — 99214 OFFICE O/P EST MOD 30 MIN: CPT | Performed by: NURSE PRACTITIONER

## 2021-08-05 RX ORDER — CEFAZOLIN SODIUM 2 G/100ML
2 INJECTION, SOLUTION INTRAVENOUS ONCE
Status: CANCELLED | OUTPATIENT
Start: 2021-09-22 | End: 2021-08-05

## 2021-08-05 RX ORDER — MELOXICAM 15 MG/1
15 TABLET ORAL ONCE
Status: CANCELLED | OUTPATIENT
Start: 2021-09-22 | End: 2021-08-05

## 2021-08-05 RX ORDER — CHLORHEXIDINE GLUCONATE 500 MG/1
CLOTH TOPICAL 2 TIMES DAILY
Status: CANCELLED | OUTPATIENT
Start: 2021-08-05

## 2021-08-05 RX ORDER — PREGABALIN 75 MG/1
150 CAPSULE ORAL ONCE
Status: CANCELLED | OUTPATIENT
Start: 2021-09-22 | End: 2021-08-05

## 2021-08-05 NOTE — PROGRESS NOTES
"Patient: Steve Mace  YOB: 1940 81 y.o. male  Medical Record Number: [id number]    Chief Complaints:   Chief Complaint   Patient presents with   • Right Knee - Follow-up   • Left Knee - Follow-up       History of Present Illness:Steve Mace is a 81 y.o. male who presents with complaints of severe right knee pain.  He has tried and failed all conservative measures including injections, physical therapy, anti-inflammatories, he would like to proceed with surgery    Allergies: No Known Allergies    Medications:   Current Outpatient Medications   Medication Sig Dispense Refill   • aspirin 81 MG EC tablet Take 81 mg by mouth Daily.     • ATORVASTATIN CALCIUM PO Take 40 mg by mouth every night at bedtime.     • coenzyme Q10 100 MG capsule Take 100 mg by mouth Daily.     • losartan (COZAAR) 50 MG tablet Take 50 mg by mouth Daily.     • metFORMIN (GLUCOPHAGE) 1000 MG tablet Take 1,000 mg by mouth 2 (Two) Times a Day.     • metoprolol tartrate (LOPRESSOR) 50 MG tablet 25 mg Daily.     • Multiple Vitamin (MULTI VITAMIN PO) Take  by mouth Daily.     • Omega-3 Fatty Acids (FISH OIL) 1000 MG capsule capsule Take 2,000 mg by mouth Daily With Breakfast.     • SYMBICORT 160-4.5 MCG/ACT inhaler INHALE TWO PUFFS BID UTD     • TURMERIC PO Take  by mouth Daily.       No current facility-administered medications for this visit.         The following portions of the patient's history were reviewed and updated as appropriate: allergies, current medications, past family history, past medical history, past social history, past surgical history and problem list.    Review of Systems:   A 14 point review of systems was performed. All systems negative except pertinent positives/negative listed in HPI above    Physical Exam:   Vitals:    08/05/21 1033   Temp: 98.6 °F (37 °C)   Weight: 100 kg (221 lb)   Height: 177.8 cm (70\")       General: A and O x 3, ASA, NAD    SCLERA:    Normal    Skin clear no unusual lesions " noted  Right knee patient has trace amount of effusion noted with limited range of motion secondary to pain calf is soft and nontender    Radiology:  Xrays previous x-rays of the right knee were reviewed and show bone-on-bone end-stage osteoarthritis    Assessment/Plan: End-stage osteoarthritis right knee    Patient I discussed options, he would like to proceed with right total knee replacement.  I will asked the Dr. Corrales give him a call next week to further discuss.  We did discuss surgery itself as well as risk benefits alternatives and the patient would like to proceed      Mindy Bustos, APRN  8/5/2021

## 2021-08-06 PROBLEM — M17.11 PRIMARY OSTEOARTHRITIS OF RIGHT KNEE: Status: ACTIVE | Noted: 2021-08-06

## 2021-09-03 ENCOUNTER — TRANSCRIBE ORDERS (OUTPATIENT)
Dept: PREADMISSION TESTING | Facility: HOSPITAL | Age: 81
End: 2021-09-03

## 2021-09-09 ENCOUNTER — PRE-ADMISSION TESTING (OUTPATIENT)
Dept: PREADMISSION TESTING | Facility: HOSPITAL | Age: 81
End: 2021-09-09

## 2021-09-09 VITALS
SYSTOLIC BLOOD PRESSURE: 151 MMHG | WEIGHT: 224 LBS | OXYGEN SATURATION: 100 % | HEART RATE: 64 BPM | BODY MASS INDEX: 32.07 KG/M2 | DIASTOLIC BLOOD PRESSURE: 83 MMHG | RESPIRATION RATE: 16 BRPM | HEIGHT: 70 IN | TEMPERATURE: 97.9 F

## 2021-09-09 DIAGNOSIS — M17.11 PRIMARY OSTEOARTHRITIS OF RIGHT KNEE: ICD-10-CM

## 2021-09-09 LAB
ANION GAP SERPL CALCULATED.3IONS-SCNC: 9.4 MMOL/L (ref 5–15)
BILIRUB UR QL STRIP: NEGATIVE
BUN SERPL-MCNC: 21 MG/DL (ref 8–23)
BUN/CREAT SERPL: 20.2 (ref 7–25)
CALCIUM SPEC-SCNC: 9.6 MG/DL (ref 8.6–10.5)
CHLORIDE SERPL-SCNC: 103 MMOL/L (ref 98–107)
CLARITY UR: CLEAR
CO2 SERPL-SCNC: 26.6 MMOL/L (ref 22–29)
COLOR UR: YELLOW
CREAT SERPL-MCNC: 1.04 MG/DL (ref 0.76–1.27)
DEPRECATED RDW RBC AUTO: 45.4 FL (ref 37–54)
ERYTHROCYTE [DISTWIDTH] IN BLOOD BY AUTOMATED COUNT: 13.5 % (ref 12.3–15.4)
GFR SERPL CREATININE-BSD FRML MDRD: 69 ML/MIN/1.73
GLUCOSE SERPL-MCNC: 147 MG/DL (ref 65–99)
GLUCOSE UR STRIP-MCNC: NEGATIVE MG/DL
HCT VFR BLD AUTO: 46.6 % (ref 37.5–51)
HGB BLD-MCNC: 14.9 G/DL (ref 13–17.7)
HGB UR QL STRIP.AUTO: NEGATIVE
KETONES UR QL STRIP: NEGATIVE
LEUKOCYTE ESTERASE UR QL STRIP.AUTO: NEGATIVE
MCH RBC QN AUTO: 29.6 PG (ref 26.6–33)
MCHC RBC AUTO-ENTMCNC: 32 G/DL (ref 31.5–35.7)
MCV RBC AUTO: 92.5 FL (ref 79–97)
NITRITE UR QL STRIP: NEGATIVE
PH UR STRIP.AUTO: 6 [PH] (ref 5–8)
PLATELET # BLD AUTO: 243 10*3/MM3 (ref 140–450)
PMV BLD AUTO: 9.5 FL (ref 6–12)
POTASSIUM SERPL-SCNC: 4.8 MMOL/L (ref 3.5–5.2)
PROT UR QL STRIP: NEGATIVE
RBC # BLD AUTO: 5.04 10*6/MM3 (ref 4.14–5.8)
SODIUM SERPL-SCNC: 139 MMOL/L (ref 136–145)
SP GR UR STRIP: 1.02 (ref 1–1.03)
UROBILINOGEN UR QL STRIP: NORMAL
WBC # BLD AUTO: 5.93 10*3/MM3 (ref 3.4–10.8)

## 2021-09-09 PROCEDURE — 81003 URINALYSIS AUTO W/O SCOPE: CPT

## 2021-09-09 PROCEDURE — 85027 COMPLETE CBC AUTOMATED: CPT

## 2021-09-09 PROCEDURE — 80048 BASIC METABOLIC PNL TOTAL CA: CPT

## 2021-09-09 PROCEDURE — 36415 COLL VENOUS BLD VENIPUNCTURE: CPT

## 2021-09-09 RX ORDER — CHLORHEXIDINE GLUCONATE 500 MG/1
CLOTH TOPICAL TAKE AS DIRECTED
COMMUNITY
End: 2021-10-23 | Stop reason: HOSPADM

## 2021-09-09 RX ORDER — ATORVASTATIN CALCIUM 40 MG/1
40 TABLET, FILM COATED ORAL DAILY
COMMUNITY
Start: 2021-07-13

## 2021-09-09 RX ORDER — CHLORHEXIDINE GLUCONATE 500 MG/1
CLOTH TOPICAL 2 TIMES DAILY
Status: ACTIVE | OUTPATIENT
Start: 2021-09-09

## 2021-09-09 ASSESSMENT — KOOS JR
KOOS JR SCORE: 6
KOOS JR SCORE: 70.704

## 2021-09-09 NOTE — DISCHARGE INSTRUCTIONS

## 2021-10-08 ENCOUNTER — OFFICE VISIT (OUTPATIENT)
Dept: ORTHOPEDIC SURGERY | Facility: CLINIC | Age: 81
End: 2021-10-08

## 2021-10-08 VITALS — BODY MASS INDEX: 32.21 KG/M2 | TEMPERATURE: 97.5 F | WEIGHT: 225 LBS | HEIGHT: 70 IN

## 2021-10-08 DIAGNOSIS — M17.11 PRIMARY OSTEOARTHRITIS OF RIGHT KNEE: ICD-10-CM

## 2021-10-08 DIAGNOSIS — R52 PAIN: Primary | ICD-10-CM

## 2021-10-08 PROCEDURE — 77077 JOINT SURVEY SINGLE VIEW: CPT | Performed by: ORTHOPAEDIC SURGERY

## 2021-10-08 PROCEDURE — S0260 H&P FOR SURGERY: HCPCS | Performed by: NURSE PRACTITIONER

## 2021-10-08 NOTE — H&P
Patient: Steve Mace    Date of Admission: 10/22/2021    YOB: 1940    Medical Record Number: [id number]    Admitting Physician: Dr. Iban Corrales    Reason for Admission: End Stage Right Knee OA    History of Present Illness: 81 y.o. male presents with severe end stage knee osteoarthritis which has not been responsive to the full compliment of conservative measures. Despite conservative attempts, there is still severe, constant activity limiting pain. Given the severity of the pain, the patient has elected to proceed with knee replacement.    Allergies: No Known Allergies      Current Medications:  Home Medications:    Current Outpatient Medications on File Prior to Visit   Medication Sig   • aspirin 81 MG EC tablet Take 81 mg by mouth Daily. HOLD PRIOR TO SURGERY PER MD INSTRUCTIONS   • atorvastatin (LIPITOR) 40 MG tablet Take 40 mg by mouth Daily.   • Chlorhexidine Gluconate Cloth 2 % pads Apply  topically Take As Directed. PRIOR TO SURGERY   • coenzyme Q10 100 MG capsule Take 100 mg by mouth Daily. HOLD PRIOR TO SURGERY   • losartan (COZAAR) 50 MG tablet Take 50 mg by mouth Daily.   • metFORMIN (GLUCOPHAGE) 1000 MG tablet Take 1,000 mg by mouth 2 (Two) Times a Day.   • metoprolol tartrate (LOPRESSOR) 50 MG tablet 25 mg Daily.   • Multiple Vitamin (MULTI VITAMIN PO) Take  by mouth Daily. HOLD PRIOR TO SURGERY   • mupirocin (BACTROBAN) 2 % nasal ointment into the nostril(s) as directed by provider Take As Directed. PRIOR TO SURGERY   • Omega-3 Fatty Acids (FISH OIL) 1000 MG capsule capsule Take 2,000 mg by mouth Daily With Breakfast.   • SYMBICORT 160-4.5 MCG/ACT inhaler INHALE TWO PUFFS BID UTD     Current Facility-Administered Medications on File Prior to Visit   Medication   • Chlorhexidine Gluconate Cloth 2 % pads     PRN Meds:.    PMH:     Past Medical History:   Diagnosis Date   • Acute myopericarditis    • Chronic renal insufficiency    • COPD (chronic obstructive pulmonary disease) ([facility])    •  "Coronary artery disease    • Diabetes (HCC)    • Hyperlipidemia    • Hypertension    • Knee swelling 2019    HAVE HAD 3 CORTISONE SHOTS   • Lumbosacral disc disease 'S   • Mitral valve prolapse    • Prostate cancer (HCC)    • Sleep apnea     CPAP-SOME USE   • Thoracic aortic aneurysm without rupture (HCC)        PF/Surg/Soc Hx:     Past Surgical History:   Procedure Laterality Date   • PROSTATECTOMY          Social History     Occupational History   • Not on file   Tobacco Use   • Smoking status: Former Smoker     Packs/day: 1.00     Years: 20.00     Pack years: 20.00     Types: Cigarettes     Quit date: 10/18/1996     Years since quittin.9   • Smokeless tobacco: Never Used   Vaping Use   • Vaping Use: Never used   Substance and Sexual Activity   • Alcohol use: Not Currently     Alcohol/week: 0.0 standard drinks   • Drug use: No   • Sexual activity: Never      Social History     Social History Narrative   • Not on file        Family History   Problem Relation Age of Onset   • Heart attack Father    • Diabetes Father    • Heart disease Brother    • Heart attack Brother    • Diabetes Other    • Diabetes Maternal Grandfather    • Malig Hyperthermia Neg Hx          Review of Systems:   A 14 point review of systems was performed, pertinent positives discussed above, all other systems are negative    Physical Exam: 81 y.o. male  Vital Signs :   Vitals:    10/08/21 0814   Temp: 97.5 °F (36.4 °C)   Weight: 102 kg (225 lb)   Height: 177.8 cm (70\")     General: Alert and Oriented x 3, No acute distress.  Psych: mood and affect appropriate; recent and remote memory intact  Eyes: conjunctiva clear; pupils equally round and reactive, sclera nonicteric  CV: no peripheral edema  Resp: normal respiratory effort  Skin: no rashes or wounds; normal turgor  Musculosketetal; pain and crepitance with knee range of motion  Vascular: palpable distal pulses    Xrays:  -3 views (AP, lateral, and sunrise) were reviewed demonstrating " end-stage OA with bone on bone articulation.  -A full length AP xray was ordered today for purposes of operative alignment demonstrating end stage arthritic findings. There are no previous full length films for review    Assessment:  End-stage Right knee osteoarthritis. Conservative measures have failed.      Plan:  The plan is to proceed with Right Total Knee Replacement. The patient voiced understanding of the risks, benefits, and alternative forms of treatment that were discussed with Dr Corrales at the time of scheduling. 23H with [facility].     Juice Sal, CRYS  10/8/2021

## 2021-10-11 ENCOUNTER — TRANSCRIBE ORDERS (OUTPATIENT)
Dept: PREADMISSION TESTING | Facility: HOSPITAL | Age: 81
End: 2021-10-11

## 2021-10-11 ENCOUNTER — PREP FOR SURGERY (OUTPATIENT)
Dept: OTHER | Facility: HOSPITAL | Age: 81
End: 2021-10-11

## 2021-10-11 DIAGNOSIS — Z01.818 OTHER SPECIFIED PRE-OPERATIVE EXAMINATION: Primary | ICD-10-CM

## 2021-10-11 DIAGNOSIS — M17.11 PRIMARY OSTEOARTHRITIS OF RIGHT KNEE: Primary | ICD-10-CM

## 2021-10-11 RX ORDER — CHLORHEXIDINE GLUCONATE 500 MG/1
CLOTH TOPICAL TAKE AS DIRECTED
Status: CANCELLED | OUTPATIENT
Start: 2021-10-11

## 2021-10-12 ENCOUNTER — PRE-ADMISSION TESTING (OUTPATIENT)
Dept: PREADMISSION TESTING | Facility: HOSPITAL | Age: 81
End: 2021-10-12

## 2021-10-12 VITALS
SYSTOLIC BLOOD PRESSURE: 138 MMHG | DIASTOLIC BLOOD PRESSURE: 81 MMHG | HEART RATE: 60 BPM | OXYGEN SATURATION: 99 % | WEIGHT: 221.6 LBS | HEIGHT: 70 IN | TEMPERATURE: 97.7 F | BODY MASS INDEX: 31.73 KG/M2 | RESPIRATION RATE: 16 BRPM

## 2021-10-12 DIAGNOSIS — M17.11 PRIMARY OSTEOARTHRITIS OF RIGHT KNEE: ICD-10-CM

## 2021-10-12 LAB
ANION GAP SERPL CALCULATED.3IONS-SCNC: 12 MMOL/L (ref 5–15)
BACTERIA UR QL AUTO: NORMAL /HPF
BILIRUB UR QL STRIP: NEGATIVE
BUN SERPL-MCNC: 19 MG/DL (ref 8–23)
BUN/CREAT SERPL: 17 (ref 7–25)
CALCIUM SPEC-SCNC: 9 MG/DL (ref 8.6–10.5)
CHLORIDE SERPL-SCNC: 105 MMOL/L (ref 98–107)
CLARITY UR: CLEAR
CO2 SERPL-SCNC: 21 MMOL/L (ref 22–29)
COLOR UR: YELLOW
CREAT SERPL-MCNC: 1.12 MG/DL (ref 0.76–1.27)
DEPRECATED RDW RBC AUTO: 46.1 FL (ref 37–54)
ERYTHROCYTE [DISTWIDTH] IN BLOOD BY AUTOMATED COUNT: 13.5 % (ref 12.3–15.4)
GFR SERPL CREATININE-BSD FRML MDRD: 63 ML/MIN/1.73
GLUCOSE SERPL-MCNC: 144 MG/DL (ref 65–99)
GLUCOSE UR STRIP-MCNC: NEGATIVE MG/DL
HCT VFR BLD AUTO: 45.5 % (ref 37.5–51)
HGB BLD-MCNC: 15.1 G/DL (ref 13–17.7)
HGB UR QL STRIP.AUTO: ABNORMAL
HYALINE CASTS UR QL AUTO: NORMAL /LPF
KETONES UR QL STRIP: NEGATIVE
LEUKOCYTE ESTERASE UR QL STRIP.AUTO: NEGATIVE
MCH RBC QN AUTO: 30.3 PG (ref 26.6–33)
MCHC RBC AUTO-ENTMCNC: 33.2 G/DL (ref 31.5–35.7)
MCV RBC AUTO: 91.4 FL (ref 79–97)
NITRITE UR QL STRIP: NEGATIVE
PH UR STRIP.AUTO: <=5 [PH] (ref 5–8)
PLATELET # BLD AUTO: 255 10*3/MM3 (ref 140–450)
PMV BLD AUTO: 9.7 FL (ref 6–12)
POTASSIUM SERPL-SCNC: 4.5 MMOL/L (ref 3.5–5.2)
PROT UR QL STRIP: NEGATIVE
RBC # BLD AUTO: 4.98 10*6/MM3 (ref 4.14–5.8)
RBC # UR: NORMAL /HPF
REF LAB TEST METHOD: NORMAL
SODIUM SERPL-SCNC: 138 MMOL/L (ref 136–145)
SP GR UR STRIP: 1.02 (ref 1–1.03)
SQUAMOUS #/AREA URNS HPF: NORMAL /HPF
UROBILINOGEN UR QL STRIP: ABNORMAL
WBC # BLD AUTO: 7.13 10*3/MM3 (ref 3.4–10.8)
WBC UR QL AUTO: NORMAL /HPF

## 2021-10-12 PROCEDURE — 80048 BASIC METABOLIC PNL TOTAL CA: CPT

## 2021-10-12 PROCEDURE — 36415 COLL VENOUS BLD VENIPUNCTURE: CPT

## 2021-10-12 PROCEDURE — 85027 COMPLETE CBC AUTOMATED: CPT

## 2021-10-12 PROCEDURE — 81001 URINALYSIS AUTO W/SCOPE: CPT

## 2021-10-12 RX ORDER — CHLORHEXIDINE GLUCONATE 500 MG/1
CLOTH TOPICAL TAKE AS DIRECTED
Status: ACTIVE | OUTPATIENT
Start: 2021-10-12

## 2021-10-12 ASSESSMENT — KOOS JR
KOOS JR SCORE: 63.776
KOOS JR SCORE: 9

## 2021-10-12 NOTE — DISCHARGE INSTRUCTIONS
Take the following medications the morning of surgery: METOPROLOL AND SYMBICORT INHALER      COVID TESTING IS SCHEDULED FOR 10/20/2021 @ 1020AM    ARRIVAL TIME FOR SURGERY ON 10/22 IS 1:00PM    BRING CPAP AND INHALER    If you are on prescription narcotic pain medication to control your pain you may also take that medication the morning of surgery.    General Instructions:  • Do not eat solid food after midnight the night before surgery.  • You may drink clear liquids day of surgery but must stop at least one hour before your hospital arrival time.  • It is beneficial for you to have a clear drink that contains carbohydrates the day of surgery.  We suggest a 12 to 20 ounce bottle of Gatorade or Powerade for non-diabetic patients or a 12 to 20 ounce bottle of G2 or Powerade Zero for diabetic patients. (Pediatric patients, are not advised to drink a 12 to 20 ounce carbohydrate drink)    Clear liquids are liquids you can see through.  Nothing red in color.     Plain water                               Sports drinks  Sodas                                   Gelatin (Jell-O)  Fruit juices without pulp such as white grape juice and apple juice  Popsicles that contain no fruit or yogurt  Tea or coffee (no cream or milk added)  Gatorade / Powerade  G2 / Powerade Zero    • Patients who avoid smoking, chewing tobacco and alcohol for 4 weeks prior to surgery have a reduced risk of post-operative complications.  Quit smoking as many days before surgery as you can.  • Do not smoke, use chewing tobacco or drink alcohol the day of surgery.   • If applicable bring your C-PAP/ BI-PAP machine.  • Bring any papers given to you in the doctor’s office.  • Wear clean comfortable clothes.  • Do not wear contact lenses, false eyelashes or make-up.  Bring a case for your glasses.   • Bring crutches or walker if applicable.  • Remove all piercings.  Leave jewelry and any other valuables at home.  • Hair extensions with metal clips must be  removed prior to surgery.  • The Pre-Admission Testing nurse will instruct you to bring medications if unable to obtain an accurate list in Pre-Admission Testing.            Preventing a Surgical Site Infection:  • For 2 to 3 days before surgery, avoid shaving with a razor because the razor can irritate skin and make it easier to develop an infection.    • Any areas of open skin can increase the risk of a post-operative wound infection by allowing bacteria to enter and travel throughout the body.  Notify your surgeon if you have any skin wounds / rashes even if it is not near the expected surgical site.  The area will need assessed to determine if surgery should be delayed until it is healed.  • The night prior to surgery shower using a fresh bar of anti-bacterial soap (such as Dial) and clean washcloth.  Sleep in a clean bed with clean clothing.  Do not allow pets to sleep with you.  • Shower on the morning of surgery using a fresh bar of anti-bacterial soap (such as Dial) and clean washcloth.  Dry with a clean towel and dress in clean clothing.  • Ask your surgeon if you will be receiving antibiotics prior to surgery.  • Make sure you, your family, and all healthcare providers clean their hands with soap and water or an alcohol based hand  before caring for you or your wound.      CHLORHEXIDINE CLOTH INSTRUCTIONS  The morning of surgery follow these instructions using the Chlorhexidine cloths you've been given.  These steps reduce bacteria on the body.  Do not use the cloths near your eyes, ears mouth, genitalia or on open wounds.  Throw the cloths away after use but do not try to flush them down a toilet.      • Open and remove one cloth at a time from the package.    • Leave the cloth unfolded and begin the bathing.  • Massage the skin with the cloths using gentle pressure to remove bacteria.  Do not scrub harshly.   • Follow the steps below with one 2% CHG cloth per area (6 total cloths).  • One cloth  for neck, shoulders and chest.  • One cloth for both arms, hands, fingers and underarms (do underarms last).  • One cloth for the abdomen followed by groin.  • One cloth for right leg and foot including between the toes.  • One cloth for left leg and foot including between the toes.  • The last cloth is to be used for the back of the neck, back and buttocks.    Allow the CHG to air dry 3 minutes on the skin which will give it time to work and decrease the chance of irritation.  The skin may feel sticky until it is dry.  Do not rinse with water or any other liquid or you will lose the beneficial effects of the CHG.  If mild skin irritation occurs, do rinse the skin to remove the CHG.  Report this to the nurse at time of admission.  Do not apply lotions, creams, ointments, deodorants or perfumes after using the clothes. Dress in clean clothes before coming to the hospital.    BACTROBAN NASAL OINTMENT  There are many germs normally in your nose. Bactroban is an ointment that will help reduce these germs. Please follow these instructions for Bactroban use:      __#1__The day before surgery in the morning  Date___10/21_____    __#2__The day before surgery in the evening              Date____10/21____    __#3__The day of surgery in the morning    Date______10/22__    **Squirt ½ package of Bactroban Ointment onto a cotton applicator and apply to inside of 1st nostril.  Squirt the remaining Bactroban and apply to the inside of the other nostril.        Day of surgery:  Your arrival time is approximately two hours before your scheduled surgery time.  Upon arrival, a Pre-op nurse and Anesthesiologist will review your health history, obtain vital signs, and answer questions you may have.  The only belongings needed at this time will be a list of your home medications and if applicable your C-PAP/BI-PAP machine.  A Pre-op nurse will start an IV and you may receive medication in preparation for surgery, including something to  help you relax.     Please be aware that surgery does come with discomfort.  We want to make every effort to control your discomfort so please discuss any uncontrolled symptoms with your nurse.   Your doctor will most likely have prescribed pain medications.      If you are going home after surgery you will receive individualized written care instructions before being discharged.  A responsible adult must drive you to and from the hospital on the day of your surgery and stay with you for 24 hours.  Discharge prescriptions can be filled by the hospital pharmacy during regular pharmacy hours.  If you are having surgery late in the day/evening your prescription may be e-prescribed to your pharmacy.  Please verify your pharmacy hours or chose a 24 hour pharmacy to avoid not having access to your prescription because your pharmacy has closed for the day.    If you are staying overnight following surgery, you will be transported to your hospital room following the recovery period.  [facility] has all private rooms.    If you have any questions please call Pre-Admission Testing at [phone].  Deductibles and co-payments are collected on the day of service. Please be prepared to pay the required co-pay, deductible or deposit on the day of service as defined by your plan.    Patient Education for Self-Quarantine Process    • Following your COVID testing, we strongly recommend that you wear a mask when you are with other people and practice social distancing.   • Limit your activities to only required outings.  • Wash your hands with soap and water frequently for at least 20 seconds.   • Avoid touching your eyes, nose and mouth with unwashed hands.  • Do not share anything - utensils, drinking glasses, food from the same bowl.   • Sanitize household surfaces daily. Include all high touch areas (door handles, light switches, phones, countertops, etc.)    Call your surgeon immediately if you experience any of  the following symptoms:  • Sore Throat  • Shortness of Breath or difficulty breathing  • Cough  • Chills  • Body soreness or muscle pain  • Headache  • Fever  • New loss of taste or smell  • Do not arrive for your surgery ill.  Your procedure will need to be rescheduled to another time.  You will need to call your physician before the day of surgery to avoid any unnecessary exposure to hospital staff as well as other patients.

## 2021-10-20 ENCOUNTER — LAB (OUTPATIENT)
Dept: LAB | Facility: HOSPITAL | Age: 81
End: 2021-10-20

## 2021-10-20 DIAGNOSIS — Z01.818 OTHER SPECIFIED PRE-OPERATIVE EXAMINATION: ICD-10-CM

## 2021-10-20 LAB — SARS-COV-2 ORF1AB RESP QL NAA+PROBE: NOT DETECTED

## 2021-10-20 PROCEDURE — C9803 HOPD COVID-19 SPEC COLLECT: HCPCS

## 2021-10-20 PROCEDURE — U0004 COV-19 TEST NON-CDC HGH THRU: HCPCS

## 2021-10-22 ENCOUNTER — ANESTHESIA EVENT (OUTPATIENT)
Dept: PERIOP | Facility: HOSPITAL | Age: 81
End: 2021-10-22

## 2021-10-22 ENCOUNTER — ANESTHESIA (OUTPATIENT)
Dept: PERIOP | Facility: HOSPITAL | Age: 81
End: 2021-10-22

## 2021-10-22 ENCOUNTER — HOSPITAL ENCOUNTER (OUTPATIENT)
Facility: HOSPITAL | Age: 81
Discharge: HOME HEALTH | End: 2021-10-23
Attending: ORTHOPAEDIC SURGERY | Admitting: ORTHOPAEDIC SURGERY

## 2021-10-22 ENCOUNTER — APPOINTMENT (OUTPATIENT)
Dept: GENERAL RADIOLOGY | Facility: HOSPITAL | Age: 81
End: 2021-10-22

## 2021-10-22 DIAGNOSIS — M17.11 PRIMARY OSTEOARTHRITIS OF RIGHT KNEE: ICD-10-CM

## 2021-10-22 DIAGNOSIS — Z96.651 S/P TKR (TOTAL KNEE REPLACEMENT), RIGHT: Primary | ICD-10-CM

## 2021-10-22 LAB
GLUCOSE BLDC GLUCOMTR-MCNC: 114 MG/DL (ref 70–130)
GLUCOSE BLDC GLUCOMTR-MCNC: 271 MG/DL (ref 70–130)

## 2021-10-22 PROCEDURE — 25010000002 VANCOMYCIN 10 G RECONSTITUTED SOLUTION: Performed by: NURSE PRACTITIONER

## 2021-10-22 PROCEDURE — 25010000002 ROPIVACAINE PER 1 MG: Performed by: ANESTHESIOLOGY

## 2021-10-22 PROCEDURE — 25010000002 ONDANSETRON PER 1 MG: Performed by: NURSE ANESTHETIST, CERTIFIED REGISTERED

## 2021-10-22 PROCEDURE — 25010000002 MAGNESIUM SULFATE PER 500 MG OF MAGNESIUM: Performed by: NURSE ANESTHETIST, CERTIFIED REGISTERED

## 2021-10-22 PROCEDURE — C1776 JOINT DEVICE (IMPLANTABLE): HCPCS | Performed by: ORTHOPAEDIC SURGERY

## 2021-10-22 PROCEDURE — 94799 UNLISTED PULMONARY SVC/PX: CPT

## 2021-10-22 PROCEDURE — C1889 IMPLANT/INSERT DEVICE, NOC: HCPCS | Performed by: ORTHOPAEDIC SURGERY

## 2021-10-22 PROCEDURE — 27447 TOTAL KNEE ARTHROPLASTY: CPT | Performed by: NURSE PRACTITIONER

## 2021-10-22 PROCEDURE — 25010000003 CEFAZOLIN IN DEXTROSE 2-4 GM/100ML-% SOLUTION: Performed by: NURSE PRACTITIONER

## 2021-10-22 PROCEDURE — C9290 INJ, BUPIVACAINE LIPOSOME: HCPCS | Performed by: ORTHOPAEDIC SURGERY

## 2021-10-22 PROCEDURE — 25010000002 DEXAMETHASONE PER 1 MG: Performed by: NURSE ANESTHETIST, CERTIFIED REGISTERED

## 2021-10-22 PROCEDURE — 76942 ECHO GUIDE FOR BIOPSY: CPT | Performed by: ORTHOPAEDIC SURGERY

## 2021-10-22 PROCEDURE — 25010000002 MIDAZOLAM PER 1 MG: Performed by: ANESTHESIOLOGY

## 2021-10-22 PROCEDURE — C1713 ANCHOR/SCREW BN/BN,TIS/BN: HCPCS | Performed by: ORTHOPAEDIC SURGERY

## 2021-10-22 PROCEDURE — 25010000002 FENTANYL CITRATE (PF) 50 MCG/ML SOLUTION: Performed by: ANESTHESIOLOGY

## 2021-10-22 PROCEDURE — 82962 GLUCOSE BLOOD TEST: CPT

## 2021-10-22 PROCEDURE — 25010000002 PROPOFOL 10 MG/ML EMULSION: Performed by: NURSE ANESTHETIST, CERTIFIED REGISTERED

## 2021-10-22 PROCEDURE — 27447 TOTAL KNEE ARTHROPLASTY: CPT | Performed by: ORTHOPAEDIC SURGERY

## 2021-10-22 PROCEDURE — 25010000002 FENTANYL CITRATE (PF) 50 MCG/ML SOLUTION: Performed by: NURSE ANESTHETIST, CERTIFIED REGISTERED

## 2021-10-22 PROCEDURE — 73560 X-RAY EXAM OF KNEE 1 OR 2: CPT

## 2021-10-22 PROCEDURE — 25010000003 BUPIVACAINE LIPOSOME 1.3 % SUSPENSION 20 ML VIAL: Performed by: ORTHOPAEDIC SURGERY

## 2021-10-22 PROCEDURE — 25010000002 NEOSTIGMINE 5 MG/10ML SOLUTION: Performed by: NURSE ANESTHETIST, CERTIFIED REGISTERED

## 2021-10-22 DEVICE — GENESIS II NON-POROUS TIBIAL                                    BASEPLATE SIZE 7 RIGHT
Type: IMPLANTABLE DEVICE | Site: KNEE | Status: FUNCTIONAL
Brand: GENESIS II

## 2021-10-22 DEVICE — IMPLANTABLE DEVICE: Type: IMPLANTABLE DEVICE | Site: KNEE | Status: FUNCTIONAL

## 2021-10-22 DEVICE — GENESIS II BICONVEX PATELLA 32MM
Type: IMPLANTABLE DEVICE | Site: KNEE | Status: FUNCTIONAL
Brand: GENESIS II

## 2021-10-22 DEVICE — LEGION CRUCIATE RETAINING HIGH                                    FLEX HIGHLY CROSS LINKED                                    POLYETHYLENE SIZE 7-8 13MM
Type: IMPLANTABLE DEVICE | Site: KNEE | Status: FUNCTIONAL
Brand: LEGION

## 2021-10-22 DEVICE — KNOTLESS TISSUE CONTROL DEVICE, UNDYED UNIDIRECTIONAL (ANTIBACTERIAL) SYNTHETIC ABSORBABLE DEVICE
Type: IMPLANTABLE DEVICE | Site: KNEE | Status: FUNCTIONAL
Brand: STRATAFIX

## 2021-10-22 DEVICE — VIOLET ANTIBACTERIAL POLYDIOXANONE, KNOTLESS TISSUE CONTROL DEVICE
Type: IMPLANTABLE DEVICE | Site: KNEE | Status: FUNCTIONAL
Brand: STRATAFIX

## 2021-10-22 DEVICE — PALACOS® R IS A FAST-CURING, RADIOPAQUE, POLY(METHYL METHACRYLATE)-BASED BONE CEMENT.PALACOS ® R CONTAINS THE X-RAY CONTRAST MEDIUM ZIRCONIUM DIOXIDE. TO IMPROVE VISIBILITY IN THE SURGICAL FIELD PALACOS ® R HAS BEEN COLOURED WITH CHLOROPHYLL (E141). THE BONE CEMENT IS PREPARED DIRECTLY BEFORE USE BY MIXING A POLYMER POWDER COMPONENT WITH A LIQUID MONOMER COMPONENT. A DUCTILE DOUGH FORMS WHICH CURES WITHIN A FEW MINUTES.
Type: IMPLANTABLE DEVICE | Site: KNEE | Status: FUNCTIONAL
Brand: PALACOS®

## 2021-10-22 DEVICE — LEGION CRUCIATE RETAINING OXINIUM                                    FEMORAL SIZE 7 RIGHT
Type: IMPLANTABLE DEVICE | Site: KNEE | Status: FUNCTIONAL
Brand: LEGION

## 2021-10-22 RX ORDER — HYDROMORPHONE HYDROCHLORIDE 1 MG/ML
0.5 INJECTION, SOLUTION INTRAMUSCULAR; INTRAVENOUS; SUBCUTANEOUS
Status: DISCONTINUED | OUTPATIENT
Start: 2021-10-22 | End: 2021-10-22 | Stop reason: HOSPADM

## 2021-10-22 RX ORDER — MAGNESIUM HYDROXIDE 1200 MG/15ML
LIQUID ORAL AS NEEDED
Status: DISCONTINUED | OUTPATIENT
Start: 2021-10-22 | End: 2021-10-22 | Stop reason: HOSPADM

## 2021-10-22 RX ORDER — SODIUM CHLORIDE 0.9 % (FLUSH) 0.9 %
3-10 SYRINGE (ML) INJECTION AS NEEDED
Status: DISCONTINUED | OUTPATIENT
Start: 2021-10-22 | End: 2021-10-22 | Stop reason: HOSPADM

## 2021-10-22 RX ORDER — HYDRALAZINE HYDROCHLORIDE 20 MG/ML
5 INJECTION INTRAMUSCULAR; INTRAVENOUS
Status: DISCONTINUED | OUTPATIENT
Start: 2021-10-22 | End: 2021-10-22 | Stop reason: HOSPADM

## 2021-10-22 RX ORDER — GLYCOPYRROLATE 0.2 MG/ML
INJECTION INTRAMUSCULAR; INTRAVENOUS AS NEEDED
Status: DISCONTINUED | OUTPATIENT
Start: 2021-10-22 | End: 2021-10-22 | Stop reason: SURG

## 2021-10-22 RX ORDER — PANTOPRAZOLE SODIUM 40 MG/1
40 TABLET, DELAYED RELEASE ORAL
Status: DISCONTINUED | OUTPATIENT
Start: 2021-10-23 | End: 2021-10-23 | Stop reason: HOSPADM

## 2021-10-22 RX ORDER — CEFAZOLIN SODIUM 2 G/100ML
2 INJECTION, SOLUTION INTRAVENOUS ONCE
Status: COMPLETED | OUTPATIENT
Start: 2021-10-22 | End: 2021-10-22

## 2021-10-22 RX ORDER — HYDROCODONE BITARTRATE AND ACETAMINOPHEN 7.5; 325 MG/1; MG/1
2 TABLET ORAL EVERY 4 HOURS PRN
Status: DISCONTINUED | OUTPATIENT
Start: 2021-10-22 | End: 2021-10-23 | Stop reason: HOSPADM

## 2021-10-22 RX ORDER — POLYETHYLENE GLYCOL 3350 17 G/17G
17 POWDER, FOR SOLUTION ORAL 2 TIMES DAILY
Status: DISCONTINUED | OUTPATIENT
Start: 2021-10-22 | End: 2021-10-23 | Stop reason: HOSPADM

## 2021-10-22 RX ORDER — FENTANYL CITRATE 50 UG/ML
INJECTION, SOLUTION INTRAMUSCULAR; INTRAVENOUS
Status: DISCONTINUED | OUTPATIENT
Start: 2021-10-22 | End: 2021-10-23 | Stop reason: HOSPADM

## 2021-10-22 RX ORDER — KETAMINE HYDROCHLORIDE 10 MG/ML
INJECTION INTRAMUSCULAR; INTRAVENOUS AS NEEDED
Status: DISCONTINUED | OUTPATIENT
Start: 2021-10-22 | End: 2021-10-22 | Stop reason: SURG

## 2021-10-22 RX ORDER — MELOXICAM 15 MG/1
15 TABLET ORAL DAILY
Status: DISCONTINUED | OUTPATIENT
Start: 2021-10-23 | End: 2021-10-23 | Stop reason: HOSPADM

## 2021-10-22 RX ORDER — FAMOTIDINE 10 MG/ML
20 INJECTION, SOLUTION INTRAVENOUS ONCE
Status: COMPLETED | OUTPATIENT
Start: 2021-10-22 | End: 2021-10-22

## 2021-10-22 RX ORDER — LIDOCAINE HYDROCHLORIDE 20 MG/ML
INJECTION, SOLUTION INFILTRATION; PERINEURAL AS NEEDED
Status: DISCONTINUED | OUTPATIENT
Start: 2021-10-22 | End: 2021-10-22 | Stop reason: SURG

## 2021-10-22 RX ORDER — PROPOFOL 10 MG/ML
VIAL (ML) INTRAVENOUS AS NEEDED
Status: DISCONTINUED | OUTPATIENT
Start: 2021-10-22 | End: 2021-10-22 | Stop reason: SURG

## 2021-10-22 RX ORDER — MELOXICAM 7.5 MG/1
7.5 TABLET ORAL DAILY
Qty: 14 TABLET | Refills: 0 | Status: SHIPPED | OUTPATIENT
Start: 2021-10-22 | End: 2021-11-05

## 2021-10-22 RX ORDER — HYDROCODONE BITARTRATE AND ACETAMINOPHEN 7.5; 325 MG/1; MG/1
1 TABLET ORAL ONCE AS NEEDED
Status: COMPLETED | OUTPATIENT
Start: 2021-10-22 | End: 2021-10-22

## 2021-10-22 RX ORDER — NEOSTIGMINE METHYLSULFATE 0.5 MG/ML
INJECTION, SOLUTION INTRAVENOUS AS NEEDED
Status: DISCONTINUED | OUTPATIENT
Start: 2021-10-22 | End: 2021-10-22 | Stop reason: SURG

## 2021-10-22 RX ORDER — PANTOPRAZOLE SODIUM 40 MG/1
40 TABLET, DELAYED RELEASE ORAL DAILY
Qty: 14 TABLET | Refills: 0 | Status: SHIPPED | OUTPATIENT
Start: 2021-10-22 | End: 2021-11-05

## 2021-10-22 RX ORDER — EPHEDRINE SULFATE 50 MG/ML
5 INJECTION, SOLUTION INTRAVENOUS ONCE AS NEEDED
Status: DISCONTINUED | OUTPATIENT
Start: 2021-10-22 | End: 2021-10-22 | Stop reason: HOSPADM

## 2021-10-22 RX ORDER — TRANEXAMIC ACID 100 MG/ML
INJECTION, SOLUTION INTRAVENOUS AS NEEDED
Status: DISCONTINUED | OUTPATIENT
Start: 2021-10-22 | End: 2021-10-22 | Stop reason: SURG

## 2021-10-22 RX ORDER — SODIUM CHLORIDE 0.9 % (FLUSH) 0.9 %
3 SYRINGE (ML) INJECTION EVERY 12 HOURS SCHEDULED
Status: DISCONTINUED | OUTPATIENT
Start: 2021-10-22 | End: 2021-10-22 | Stop reason: HOSPADM

## 2021-10-22 RX ORDER — NALOXONE HCL 0.4 MG/ML
0.2 VIAL (ML) INJECTION AS NEEDED
Status: DISCONTINUED | OUTPATIENT
Start: 2021-10-22 | End: 2021-10-22 | Stop reason: HOSPADM

## 2021-10-22 RX ORDER — UREA 10 %
3 LOTION (ML) TOPICAL NIGHTLY PRN
Status: DISCONTINUED | OUTPATIENT
Start: 2021-10-22 | End: 2021-10-23 | Stop reason: HOSPADM

## 2021-10-22 RX ORDER — DEXAMETHASONE SODIUM PHOSPHATE 10 MG/ML
INJECTION INTRAMUSCULAR; INTRAVENOUS AS NEEDED
Status: DISCONTINUED | OUTPATIENT
Start: 2021-10-22 | End: 2021-10-22 | Stop reason: SURG

## 2021-10-22 RX ORDER — POLYETHYLENE GLYCOL 3350 17 G/17G
17 POWDER, FOR SOLUTION ORAL 2 TIMES DAILY
Qty: 255 G | Refills: 0 | Status: SHIPPED | OUTPATIENT
Start: 2021-10-22 | End: 2021-10-29

## 2021-10-22 RX ORDER — MELOXICAM 15 MG/1
15 TABLET ORAL ONCE
Status: COMPLETED | OUTPATIENT
Start: 2021-10-22 | End: 2021-10-22

## 2021-10-22 RX ORDER — SODIUM CHLORIDE, SODIUM LACTATE, POTASSIUM CHLORIDE, CALCIUM CHLORIDE 600; 310; 30; 20 MG/100ML; MG/100ML; MG/100ML; MG/100ML
9 INJECTION, SOLUTION INTRAVENOUS CONTINUOUS
Status: DISCONTINUED | OUTPATIENT
Start: 2021-10-22 | End: 2021-10-22 | Stop reason: HOSPADM

## 2021-10-22 RX ORDER — MIDAZOLAM HYDROCHLORIDE 1 MG/ML
INJECTION INTRAMUSCULAR; INTRAVENOUS
Status: DISCONTINUED | OUTPATIENT
Start: 2021-10-22 | End: 2021-10-23 | Stop reason: HOSPADM

## 2021-10-22 RX ORDER — IBUPROFEN 600 MG/1
600 TABLET ORAL ONCE AS NEEDED
Status: DISCONTINUED | OUTPATIENT
Start: 2021-10-22 | End: 2021-10-22 | Stop reason: HOSPADM

## 2021-10-22 RX ORDER — MAGNESIUM SULFATE HEPTAHYDRATE 500 MG/ML
INJECTION, SOLUTION INTRAMUSCULAR; INTRAVENOUS AS NEEDED
Status: DISCONTINUED | OUTPATIENT
Start: 2021-10-22 | End: 2021-10-22 | Stop reason: SURG

## 2021-10-22 RX ORDER — BUDESONIDE AND FORMOTEROL FUMARATE DIHYDRATE 160; 4.5 UG/1; UG/1
2 AEROSOL RESPIRATORY (INHALATION)
Status: DISCONTINUED | OUTPATIENT
Start: 2021-10-22 | End: 2021-10-23 | Stop reason: HOSPADM

## 2021-10-22 RX ORDER — PROMETHAZINE HYDROCHLORIDE 25 MG/1
25 SUPPOSITORY RECTAL ONCE AS NEEDED
Status: DISCONTINUED | OUTPATIENT
Start: 2021-10-22 | End: 2021-10-22 | Stop reason: HOSPADM

## 2021-10-22 RX ORDER — PROMETHAZINE HYDROCHLORIDE 25 MG/1
25 TABLET ORAL ONCE AS NEEDED
Status: DISCONTINUED | OUTPATIENT
Start: 2021-10-22 | End: 2021-10-22 | Stop reason: HOSPADM

## 2021-10-22 RX ORDER — ONDANSETRON 2 MG/ML
4 INJECTION INTRAMUSCULAR; INTRAVENOUS EVERY 6 HOURS PRN
Status: DISCONTINUED | OUTPATIENT
Start: 2021-10-22 | End: 2021-10-23 | Stop reason: HOSPADM

## 2021-10-22 RX ORDER — ROPIVACAINE HYDROCHLORIDE 5 MG/ML
INJECTION, SOLUTION EPIDURAL; INFILTRATION; PERINEURAL
Status: COMPLETED | OUTPATIENT
Start: 2021-10-22 | End: 2021-10-22

## 2021-10-22 RX ORDER — ONDANSETRON 2 MG/ML
4 INJECTION INTRAMUSCULAR; INTRAVENOUS ONCE AS NEEDED
Status: DISCONTINUED | OUTPATIENT
Start: 2021-10-22 | End: 2021-10-22 | Stop reason: HOSPADM

## 2021-10-22 RX ORDER — HYDROCODONE BITARTRATE AND ACETAMINOPHEN 7.5; 325 MG/1; MG/1
1 TABLET ORAL EVERY 4 HOURS PRN
Status: DISCONTINUED | OUTPATIENT
Start: 2021-10-22 | End: 2021-10-23 | Stop reason: HOSPADM

## 2021-10-22 RX ORDER — LOSARTAN POTASSIUM 50 MG/1
50 TABLET ORAL DAILY
Status: DISCONTINUED | OUTPATIENT
Start: 2021-10-23 | End: 2021-10-23 | Stop reason: HOSPADM

## 2021-10-22 RX ORDER — FENTANYL CITRATE 50 UG/ML
INJECTION, SOLUTION INTRAMUSCULAR; INTRAVENOUS AS NEEDED
Status: DISCONTINUED | OUTPATIENT
Start: 2021-10-22 | End: 2021-10-22 | Stop reason: SURG

## 2021-10-22 RX ORDER — PREGABALIN 75 MG/1
150 CAPSULE ORAL ONCE
Status: COMPLETED | OUTPATIENT
Start: 2021-10-22 | End: 2021-10-22

## 2021-10-22 RX ORDER — LABETALOL HYDROCHLORIDE 5 MG/ML
5 INJECTION, SOLUTION INTRAVENOUS
Status: DISCONTINUED | OUTPATIENT
Start: 2021-10-22 | End: 2021-10-22 | Stop reason: HOSPADM

## 2021-10-22 RX ORDER — HYDROCODONE BITARTRATE AND ACETAMINOPHEN 7.5; 325 MG/1; MG/1
TABLET ORAL
Qty: 60 TABLET | Refills: 0 | Status: SHIPPED | OUTPATIENT
Start: 2021-10-22 | End: 2021-11-09 | Stop reason: SDUPTHER

## 2021-10-22 RX ORDER — DIPHENHYDRAMINE HCL 25 MG
25 CAPSULE ORAL
Status: DISCONTINUED | OUTPATIENT
Start: 2021-10-22 | End: 2021-10-22 | Stop reason: HOSPADM

## 2021-10-22 RX ORDER — DIPHENHYDRAMINE HYDROCHLORIDE 50 MG/ML
12.5 INJECTION INTRAMUSCULAR; INTRAVENOUS
Status: DISCONTINUED | OUTPATIENT
Start: 2021-10-22 | End: 2021-10-22 | Stop reason: HOSPADM

## 2021-10-22 RX ORDER — ASPIRIN 81 MG/1
81 TABLET ORAL EVERY 12 HOURS SCHEDULED
Status: DISCONTINUED | OUTPATIENT
Start: 2021-10-23 | End: 2021-10-23 | Stop reason: HOSPADM

## 2021-10-22 RX ORDER — FLUMAZENIL 0.1 MG/ML
0.2 INJECTION INTRAVENOUS AS NEEDED
Status: DISCONTINUED | OUTPATIENT
Start: 2021-10-22 | End: 2021-10-22 | Stop reason: HOSPADM

## 2021-10-22 RX ORDER — ONDANSETRON 2 MG/ML
INJECTION INTRAMUSCULAR; INTRAVENOUS AS NEEDED
Status: DISCONTINUED | OUTPATIENT
Start: 2021-10-22 | End: 2021-10-22 | Stop reason: SURG

## 2021-10-22 RX ORDER — FENTANYL CITRATE 50 UG/ML
50 INJECTION, SOLUTION INTRAMUSCULAR; INTRAVENOUS
Status: DISCONTINUED | OUTPATIENT
Start: 2021-10-22 | End: 2021-10-22 | Stop reason: HOSPADM

## 2021-10-22 RX ORDER — ONDANSETRON 4 MG/1
4 TABLET, FILM COATED ORAL EVERY 6 HOURS PRN
Status: DISCONTINUED | OUTPATIENT
Start: 2021-10-22 | End: 2021-10-23 | Stop reason: HOSPADM

## 2021-10-22 RX ORDER — LIDOCAINE HYDROCHLORIDE 10 MG/ML
0.5 INJECTION, SOLUTION EPIDURAL; INFILTRATION; INTRACAUDAL; PERINEURAL ONCE AS NEEDED
Status: DISCONTINUED | OUTPATIENT
Start: 2021-10-22 | End: 2021-10-22 | Stop reason: HOSPADM

## 2021-10-22 RX ORDER — CEFAZOLIN SODIUM 2 G/100ML
2 INJECTION, SOLUTION INTRAVENOUS EVERY 8 HOURS
Status: COMPLETED | OUTPATIENT
Start: 2021-10-22 | End: 2021-10-23

## 2021-10-22 RX ORDER — ASPIRIN 81 MG/1
TABLET ORAL
Qty: 60 TABLET | Refills: 0 | Status: SHIPPED | OUTPATIENT
Start: 2021-10-22

## 2021-10-22 RX ORDER — OXYCODONE AND ACETAMINOPHEN 10; 325 MG/1; MG/1
1 TABLET ORAL EVERY 4 HOURS PRN
Status: DISCONTINUED | OUTPATIENT
Start: 2021-10-22 | End: 2021-10-22 | Stop reason: HOSPADM

## 2021-10-22 RX ORDER — ONDANSETRON 4 MG/1
4 TABLET, FILM COATED ORAL EVERY 8 HOURS PRN
Qty: 10 TABLET | Refills: 0 | Status: SHIPPED | OUTPATIENT
Start: 2021-10-22 | End: 2021-11-11 | Stop reason: ALTCHOICE

## 2021-10-22 RX ORDER — PROMETHAZINE HYDROCHLORIDE 12.5 MG/1
12.5 TABLET ORAL EVERY 4 HOURS PRN
Status: DISCONTINUED | OUTPATIENT
Start: 2021-10-22 | End: 2021-10-23 | Stop reason: HOSPADM

## 2021-10-22 RX ORDER — MIDAZOLAM HYDROCHLORIDE 1 MG/ML
0.5 INJECTION INTRAMUSCULAR; INTRAVENOUS
Status: DISCONTINUED | OUTPATIENT
Start: 2021-10-22 | End: 2021-10-22 | Stop reason: HOSPADM

## 2021-10-22 RX ORDER — ROCURONIUM BROMIDE 10 MG/ML
INJECTION, SOLUTION INTRAVENOUS AS NEEDED
Status: DISCONTINUED | OUTPATIENT
Start: 2021-10-22 | End: 2021-10-22 | Stop reason: SURG

## 2021-10-22 RX ADMIN — DEXAMETHASONE SODIUM PHOSPHATE 8 MG: 10 INJECTION INTRAMUSCULAR; INTRAVENOUS at 16:06

## 2021-10-22 RX ADMIN — ONDANSETRON 4 MG: 2 INJECTION INTRAMUSCULAR; INTRAVENOUS at 17:21

## 2021-10-22 RX ADMIN — GLYCOPYRROLATE 0.2 MG: 0.2 INJECTION INTRAMUSCULAR; INTRAVENOUS at 16:17

## 2021-10-22 RX ADMIN — POLYETHYLENE GLYCOL 3350 17 G: 17 POWDER, FOR SOLUTION ORAL at 21:36

## 2021-10-22 RX ADMIN — ROPIVACAINE HYDROCHLORIDE 30 ML: 5 INJECTION, SOLUTION EPIDURAL; INFILTRATION; PERINEURAL at 14:42

## 2021-10-22 RX ADMIN — TRANEXAMIC ACID 1000 MG: 1 INJECTION, SOLUTION INTRAVENOUS at 17:09

## 2021-10-22 RX ADMIN — KETAMINE HYDROCHLORIDE 10 MG: 10 INJECTION INTRAMUSCULAR; INTRAVENOUS at 17:02

## 2021-10-22 RX ADMIN — SODIUM CHLORIDE, POTASSIUM CHLORIDE, SODIUM LACTATE AND CALCIUM CHLORIDE 9 ML/HR: 600; 310; 30; 20 INJECTION, SOLUTION INTRAVENOUS at 14:30

## 2021-10-22 RX ADMIN — BUDESONIDE AND FORMOTEROL FUMARATE DIHYDRATE 2 PUFF: 160; 4.5 AEROSOL RESPIRATORY (INHALATION) at 22:08

## 2021-10-22 RX ADMIN — METFORMIN HYDROCHLORIDE 1000 MG: 1000 TABLET ORAL at 21:36

## 2021-10-22 RX ADMIN — KETAMINE HYDROCHLORIDE 40 MG: 10 INJECTION INTRAMUSCULAR; INTRAVENOUS at 16:26

## 2021-10-22 RX ADMIN — NEOSTIGMINE METHYLSULFATE 2.5 MG: 0.5 INJECTION INTRAVENOUS at 17:13

## 2021-10-22 RX ADMIN — MELOXICAM 15 MG: 15 TABLET ORAL at 14:20

## 2021-10-22 RX ADMIN — PROPOFOL 300 MG: 10 INJECTION, EMULSION INTRAVENOUS at 15:55

## 2021-10-22 RX ADMIN — MUPIROCIN 1 APPLICATION: 20 OINTMENT TOPICAL at 21:36

## 2021-10-22 RX ADMIN — PROPOFOL 200 MCG/KG/MIN: 10 INJECTION, EMULSION INTRAVENOUS at 15:56

## 2021-10-22 RX ADMIN — CEFAZOLIN SODIUM 2 G: 2 INJECTION, SOLUTION INTRAVENOUS at 15:42

## 2021-10-22 RX ADMIN — ROCURONIUM BROMIDE 50 MG: 50 INJECTION INTRAVENOUS at 15:55

## 2021-10-22 RX ADMIN — LIDOCAINE HYDROCHLORIDE 100 MG: 20 INJECTION, SOLUTION INFILTRATION; PERINEURAL at 15:55

## 2021-10-22 RX ADMIN — FAMOTIDINE 20 MG: 10 INJECTION, SOLUTION INTRAVENOUS at 14:37

## 2021-10-22 RX ADMIN — HYDROCODONE BITARTRATE AND ACETAMINOPHEN 1 TABLET: 7.5; 325 TABLET ORAL at 18:35

## 2021-10-22 RX ADMIN — FENTANYL CITRATE 50 MCG: 0.05 INJECTION, SOLUTION INTRAMUSCULAR; INTRAVENOUS at 14:39

## 2021-10-22 RX ADMIN — MIDAZOLAM 2 MG: 1 INJECTION INTRAMUSCULAR; INTRAVENOUS at 14:39

## 2021-10-22 RX ADMIN — GLYCOPYRROLATE 0.4 MG: 0.2 INJECTION INTRAMUSCULAR; INTRAVENOUS at 17:13

## 2021-10-22 RX ADMIN — FENTANYL CITRATE 50 MCG: 0.05 INJECTION, SOLUTION INTRAMUSCULAR; INTRAVENOUS at 16:40

## 2021-10-22 RX ADMIN — FENTANYL CITRATE 50 MCG: 0.05 INJECTION, SOLUTION INTRAMUSCULAR; INTRAVENOUS at 17:02

## 2021-10-22 RX ADMIN — VANCOMYCIN HYDROCHLORIDE 1500 MG: 10 INJECTION, POWDER, LYOPHILIZED, FOR SOLUTION INTRAVENOUS at 14:30

## 2021-10-22 RX ADMIN — PREGABALIN 150 MG: 75 CAPSULE ORAL at 14:20

## 2021-10-22 RX ADMIN — MAGNESIUM SULFATE HEPTAHYDRATE 2 G: 500 INJECTION, SOLUTION INTRAMUSCULAR; INTRAVENOUS at 16:26

## 2021-10-22 NOTE — ANESTHESIA POSTPROCEDURE EVALUATION
Patient: Steve Mace    Procedure Summary     Date: 10/22/21 Room / Location: [facility] OR 09 / [facility] MAIN OR    Anesthesia Start: 1545 Anesthesia Stop: 1804    Procedure: TOTAL KNEE ARTHROPLASTY (Right Knee) Diagnosis:       Primary osteoarthritis of right knee      (Primary osteoarthritis of right knee [M17.11])    Surgeons: Iban Corrales MD Provider: Del Hartley MD    Anesthesia Type: general ASA Status: 3          Anesthesia Type: general    Vitals  Vitals Value Taken Time   /91 10/22/21 1846   Temp     Pulse 68 10/22/21 1850   Resp 16 10/22/21 1815   SpO2 98 % 10/22/21 1850   Vitals shown include unvalidated device data.        Post Anesthesia Care and Evaluation    Patient location during evaluation: bedside  Patient participation: complete - patient participated  Level of consciousness: awake  Pain score: 2  Pain management: adequate  Airway patency: patent  Anesthetic complications: No anesthetic complications  PONV Status: none  Cardiovascular status: acceptable  Respiratory status: acceptable  Hydration status: acceptable    Comments: /81   Pulse 65   Temp 36.4 °C (97.6 °F) (Oral)   Resp 16   SpO2 98%

## 2021-10-22 NOTE — ANESTHESIA PROCEDURE NOTES
Airway  Urgency: elective    Date/Time: 10/22/2021 3:59 PM  Airway not difficult    General Information and Staff    Patient location during procedure: OR  Anesthesiologist: Del Hartley MD  CRNA: Jovanna Ricketts CRNA    Indications and Patient Condition  Indications for airway management: airway protection    Preoxygenated: yes  Mask difficulty assessment: 2 - vent by mask + OA or adjuvant +/- NMBA    Final Airway Details  Final airway type: endotracheal airway      Successful airway: ETT  Cuffed: yes   Successful intubation technique: direct laryngoscopy  Facilitating devices/methods: intubating stylet  Endotracheal tube insertion site: oral  Blade: Cristiano  Blade size: 4  ETT size (mm): 7.5  Cormack-Lehane Classification: grade I - full view of glottis  Placement verified by: chest auscultation and capnometry   Cuff volume (mL): 8  Measured from: lips  ETT/EBT  to lips (cm): 23  Number of attempts at approach: 1  Assessment: lips, teeth, and gum same as pre-op and atraumatic intubation

## 2021-10-22 NOTE — ANESTHESIA PROCEDURE NOTES
Peripheral Block      Patient reassessed immediately prior to procedure    Patient location during procedure: holding area  Start time: 10/22/2021 2:22 PM  Stop time: 10/22/2021 2:42 PM  Reason for block: at surgeon's request and post-op pain management  Performed by  Anesthesiologist: Del Keene MD  Preanesthetic Checklist  Completed: patient identified, IV checked, site marked, risks and benefits discussed, surgical consent, monitors and equipment checked, pre-op evaluation and timeout performed  Prep:  Sterile barriers:gloves, cap and mask  Prep: ChloraPrep  Patient monitoring: blood pressure monitoring, continuous pulse oximetry and EKG  Procedure    Sedation: yes    Guidance:ultrasound guided    ULTRASOUND INTERPRETATION.  Using ultrasound guidance a 22 G gauge needle was placed in close proximity to the femoral nerve, at which point, under ultrasound guidance anesthetic was injected in the area of the nerve and spread of the anesthesia was seen on ultrasound in close proximity thereto.  There were no abnormalities seen on ultrasound; a digital image was taken; and the patient tolerated the procedure with no complications. Images:still images obtained    Laterality:right  Block Type:adductor canal block  Injection Technique:single-shot  Needle Type:echogenic  Needle Gauge:21 G      Medications Used: ropivacaine (NAROPIN) 0.5 % injection, 30 mL      Medications  Comment:Ultrasound used to precisely deposit local anesthetic    Post Assessment  Injection Assessment: incremental injection, no paresthesia on injection and negative aspiration for heme  Patient Tolerance:comfortable throughout block  Complications:no

## 2021-10-22 NOTE — ANESTHESIA PREPROCEDURE EVALUATION
Anesthesia Evaluation     Patient summary reviewed and Nursing notes reviewed   NPO Solid Status: > 8 hours  NPO Liquid Status: > 2 hours           Airway   Mallampati: II  Neck ROM: full  No difficulty expected  Dental - normal exam     Pulmonary     breath sounds clear to auscultation  (+) a smoker Former, COPD, sleep apnea on CPAP,   Cardiovascular     Rhythm: regular    (+) hypertension, valvular problems/murmurs MVP, CAD, hyperlipidemia,       Neuro/Psych  GI/Hepatic/Renal/Endo    (+)   renal disease, diabetes mellitus,     Musculoskeletal     Abdominal    Substance History      OB/GYN          Other   arthritis,    history of cancer                    Anesthesia Plan    ASA 3     general   (Adductor canal)  intravenous induction     Anesthetic plan, all risks, benefits, and alternatives have been provided, discussed and informed consent has been obtained with: patient.

## 2021-10-23 ENCOUNTER — READMISSION MANAGEMENT (OUTPATIENT)
Dept: CALL CENTER | Facility: HOSPITAL | Age: 81
End: 2021-10-23

## 2021-10-23 VITALS
OXYGEN SATURATION: 94 % | DIASTOLIC BLOOD PRESSURE: 68 MMHG | SYSTOLIC BLOOD PRESSURE: 112 MMHG | HEIGHT: 70 IN | RESPIRATION RATE: 20 BRPM | BODY MASS INDEX: 31.88 KG/M2 | WEIGHT: 222.66 LBS | HEART RATE: 88 BPM | TEMPERATURE: 97.7 F

## 2021-10-23 LAB
GLUCOSE BLDC GLUCOMTR-MCNC: 174 MG/DL (ref 70–130)
HCT VFR BLD AUTO: 41.9 % (ref 37.5–51)
HGB BLD-MCNC: 13.8 G/DL (ref 13–17.7)

## 2021-10-23 PROCEDURE — 85018 HEMOGLOBIN: CPT | Performed by: NURSE PRACTITIONER

## 2021-10-23 PROCEDURE — 94799 UNLISTED PULMONARY SVC/PX: CPT

## 2021-10-23 PROCEDURE — 82962 GLUCOSE BLOOD TEST: CPT

## 2021-10-23 PROCEDURE — 97161 PT EVAL LOW COMPLEX 20 MIN: CPT

## 2021-10-23 PROCEDURE — 97530 THERAPEUTIC ACTIVITIES: CPT

## 2021-10-23 PROCEDURE — 25010000003 CEFAZOLIN IN DEXTROSE 2-4 GM/100ML-% SOLUTION: Performed by: NURSE PRACTITIONER

## 2021-10-23 PROCEDURE — 85014 HEMATOCRIT: CPT | Performed by: NURSE PRACTITIONER

## 2021-10-23 RX ADMIN — LOSARTAN POTASSIUM 50 MG: 50 TABLET, FILM COATED ORAL at 08:54

## 2021-10-23 RX ADMIN — CEFAZOLIN SODIUM 2 G: 2 INJECTION, SOLUTION INTRAVENOUS at 00:03

## 2021-10-23 RX ADMIN — METFORMIN HYDROCHLORIDE 1000 MG: 1000 TABLET ORAL at 08:53

## 2021-10-23 RX ADMIN — MELOXICAM 15 MG: 15 TABLET ORAL at 08:53

## 2021-10-23 RX ADMIN — CEFAZOLIN SODIUM 2 G: 2 INJECTION, SOLUTION INTRAVENOUS at 06:16

## 2021-10-23 RX ADMIN — PANTOPRAZOLE SODIUM 40 MG: 40 TABLET, DELAYED RELEASE ORAL at 06:16

## 2021-10-23 RX ADMIN — METOPROLOL TARTRATE 25 MG: 25 TABLET, FILM COATED ORAL at 08:55

## 2021-10-23 RX ADMIN — POLYETHYLENE GLYCOL 3350 17 G: 17 POWDER, FOR SOLUTION ORAL at 08:55

## 2021-10-23 RX ADMIN — BUDESONIDE AND FORMOTEROL FUMARATE DIHYDRATE 2 PUFF: 160; 4.5 AEROSOL RESPIRATORY (INHALATION) at 08:31

## 2021-10-23 RX ADMIN — ASPIRIN 81 MG: 81 TABLET, COATED ORAL at 08:54

## 2021-10-23 RX ADMIN — MUPIROCIN 1 APPLICATION: 20 OINTMENT TOPICAL at 08:56

## 2021-10-23 RX ADMIN — HYDROCODONE BITARTRATE AND ACETAMINOPHEN 1 TABLET: 7.5; 325 TABLET ORAL at 08:55

## 2021-10-23 RX ADMIN — HYDROCODONE BITARTRATE AND ACETAMINOPHEN 1 TABLET: 7.5; 325 TABLET ORAL at 00:02

## 2021-10-24 NOTE — OUTREACH NOTE
Prep Survey      Responses   [facility] facility patient discharged from? [facility]   Is LACE score < 7 ? No   Emergency Room discharge w/ pulse ox? No   Eligibility Readm Mgmt   Discharge diagnosis [facility] TOTAL KNEE ARTHROPLASTY    Does the patient have one of the following disease processes/diagnoses(primary or secondary)? Total Joint Replacement   Does the patient have Home health ordered? No   Is there a DME ordered? No   Prep survey completed? Yes          Veronica Mckenzie RN

## 2021-10-25 ENCOUNTER — HOME HEALTH ADMISSION (OUTPATIENT)
Dept: HOME HEALTH SERVICES | Facility: HOME HEALTHCARE | Age: 81
End: 2021-10-25

## 2021-10-25 ENCOUNTER — HOME CARE VISIT (OUTPATIENT)
Dept: HOME HEALTH SERVICES | Facility: HOME HEALTHCARE | Age: 81
End: 2021-10-25

## 2021-10-25 PROCEDURE — G0151 HHCP-SERV OF PT,EA 15 MIN: HCPCS

## 2021-10-25 NOTE — HOME HEALTH
REASON FOR REFERRAL:  81 year old (male/) presents with c/o difficulty walking R knee pain following R TKA  DIAGNOSIS: R knee OA      SURGICAL PROCEDURE:  10/22/21  R TKA Dr Corrales       PERTINENT MEDICAL HISTORY:   HTN, thoracic aortic aneyrysm, CAD, DM, renal insufficiency, OA, COPD, hyperlipidemia, HTN, lumbosacral disc disease, mitral valve prolapse, sleep apnea,     PRIOR LEVEL OF FUNCTION: I all mobility and self care     SUBJECTIVE: patient reports that pain has been getting progressively worse    DATE OF NEXT APPOINTMENT WITH DOCTOR: has MD follow up on 11/5/21;   ____________    PLAN FOR NEXT VISIT:   MEDICAL NECESSITY FOR ONGOING SKILLED THERAPY: Face to Face with Dr. Corrales  on  10 /22  /2021  who ordered skilled physical therapy for treatment of: gait training, transfer training, pain/edema management following recent R TKA. Requires instruction in appropriate progression of exercises; education in fall prevention/pain/edema management; gait training to reduce reliance on assistive device; balance retraining to prevent falls.  Without skilled physical therapy, patient at risk for: falls , chronic pain, long term gait deficits      SPECIFIC INTERVENTIONS AND GOALS TO ADDRESS ON NEXT VISIT:  - increase AROM greater than 24-85 degrees  - review HEP for consistency and quality; progress to standing exercises as appropriate  - review pain/edema management strategies  - transfer training  - gait training to restore normal gait mechanics      FREQUENCY AND DURATION: 3 week 2    ANY OTHER FOLLOW UP NEEDED: none    REASSESSMENT DUE DATE:  plans to transition to [facility]

## 2021-10-27 ENCOUNTER — HOME CARE VISIT (OUTPATIENT)
Dept: HOME HEALTH SERVICES | Facility: HOME HEALTHCARE | Age: 81
End: 2021-10-27

## 2021-10-27 ENCOUNTER — READMISSION MANAGEMENT (OUTPATIENT)
Dept: CALL CENTER | Facility: HOSPITAL | Age: 81
End: 2021-10-27

## 2021-10-27 VITALS
OXYGEN SATURATION: 99 % | SYSTOLIC BLOOD PRESSURE: 112 MMHG | DIASTOLIC BLOOD PRESSURE: 66 MMHG | RESPIRATION RATE: 18 BRPM | TEMPERATURE: 98.3 F | HEART RATE: 86 BPM

## 2021-10-27 PROCEDURE — G0151 HHCP-SERV OF PT,EA 15 MIN: HCPCS

## 2021-10-27 NOTE — OUTREACH NOTE
Total Joint Week 1 Survey      Responses   [facility] patient discharged from? [place]   Does the patient have one of the following disease processes/diagnoses(primary or secondary)? Total Joint Replacement   Joint surgery performed? Knee  [Right]   Week 1 attempt successful? Yes   Call start time 1344   Call end time 1345   Discharge diagnosis [place] TOTAL KNEE ARTHROPLASTY    Person spoke with today (if not patient) and relationship patient   Does the patient have all medications related to this admission filled (includes all antibiotics, pain medications, etc.) Yes   Is the patient taking all medications as directed (includes completed medication regime)? Yes   Does the patient have a follow up appointment with their surgeon? Yes   Has the patient kept scheduled appointments due by today? Yes   Psychosocial issues? No   Did the patient receive a copy of their discharge instructions? Yes   What is the patient's perception of their functional status since discharge? Improving   Week 1 call completed? Yes   Wrap up additional comments Brief call per patient.  He denies needs/concerns.          Karen Prescott RN

## 2021-10-27 NOTE — HOME HEALTH
"SUBJECTIVE: patient upset re: edema.  wondering \"how much longer this is going to go on\" as he feels like edema should have already dissipated. c/o dificulty sleeping  DATE OF NEXT APPOINTMENT WITH DOCTOR: has MD follow up on 11/5/21;   ____________   PLAN FOR NEXT VISIT:   MEDICAL NECESSITY FOR ONGOING SKILLED THERAPY: Face to Face with Dr. Corrales on 10 /22 /2021 who ordered skilled physical therapy for treatment of: gait training, transfer training, pain/edema management following recent R TKA. Requires instruction in appropriate progression of exercises; education in fall prevention/pain/edema management; gait training to reduce reliance on assistive device; balance retraining to prevent falls. Without skilled physical therapy, patient at risk for: falls , chronic pain, long term gait deficits     SPECIFIC INTERVENTIONS AND GOALS TO ADDRESS ON NEXT VISIT:   - increase AROM greater than 22-88 degrees   - review HEP for consistency and quality; progress to standing exercises as appropriate   - review pain/edema management strategies   - transfer training   - gait training to restore normal gait mechanics     FREQUENCY AND DURATION: 3 week 2   ANY OTHER FOLLOW UP NEEDED: none   plans to transition to [facility]"

## 2021-10-28 ENCOUNTER — TELEPHONE (OUTPATIENT)
Dept: ORTHOPEDIC SURGERY | Facility: HOSPITAL | Age: 81
End: 2021-10-28

## 2021-10-28 NOTE — TELEPHONE ENCOUNTER
Called and spoke with Mr. Mace as he is SP TKA. He states overall he is doing pretty good. Having some increased pain but the medication makes it tolerable. Dressing remains CDI. He took a shower yesterday and tolerated that fine. He is working with PT and says he feels he is progressing on track. He states BM's are getting better, not back to normal yet, but making progress. He has no questions for me at this time. My contact information was given should he need anything. He voiced understanding.

## 2021-10-29 ENCOUNTER — HOME CARE VISIT (OUTPATIENT)
Dept: HOME HEALTH SERVICES | Facility: HOME HEALTHCARE | Age: 81
End: 2021-10-29

## 2021-10-29 VITALS
SYSTOLIC BLOOD PRESSURE: 120 MMHG | OXYGEN SATURATION: 98 % | RESPIRATION RATE: 18 BRPM | TEMPERATURE: 98 F | DIASTOLIC BLOOD PRESSURE: 68 MMHG | HEART RATE: 88 BPM

## 2021-10-29 PROCEDURE — G0151 HHCP-SERV OF PT,EA 15 MIN: HCPCS

## 2021-10-29 NOTE — HOME HEALTH
"next friday 1:30 11/12/21  17-96 degrees right knee    SUBJECTIVE: patient remains frustrated with perceived lack of progress but reports that he \"hasn't really iced\" leg because he has forgotten.  REports he is now having bowel movements.  Notable dried skin to R Lower leg and patient verbalizes that he \"probably hasn't been drinking enough\" as his urine output has been low.   States that outpatient PT at [facility] called and scheduled appt for 11/12/21 at 1:30  DATE OF NEXT APPOINTMENT WITH DOCTOR: has MD follow up on 11/9/21;   ____________   PLAN FOR NEXT VISIT:   MEDICAL NECESSITY FOR ONGOING SKILLED THERAPY: Face to Face with Dr. Corrales on 10 /22 /2021 who ordered skilled physical therapy for treatment of: gait training, transfer training, pain/edema management following recent R TKA. Requires instruction in appropriate progression of exercises; education in fall prevention/pain/edema management; gait training to reduce reliance on assistive device; balance retraining to prevent falls. Without skilled physical therapy, patient at risk for: falls , chronic pain, long term gait deficits     SPECIFIC INTERVENTIONS AND GOALS TO ADDRESS ON NEXT VISIT:   - increase AROM right knee greater than 17-96 degrees   - review HEP for consistency and quality; progress  standing exercises as appropriate   - review pain/edema management strategies   - gait training to restore normal gait mechanics ; progress to cane as appropriate; initiate stair training    FREQUENCY AND DURATION: 3 week 2   ANY OTHER FOLLOW UP NEEDED: outpatient PT appt may need to be relocated to avoid interuption in PT services"

## 2021-11-01 ENCOUNTER — HOME CARE VISIT (OUTPATIENT)
Dept: HOME HEALTH SERVICES | Facility: HOME HEALTHCARE | Age: 81
End: 2021-11-01

## 2021-11-01 VITALS
SYSTOLIC BLOOD PRESSURE: 144 MMHG | DIASTOLIC BLOOD PRESSURE: 82 MMHG | TEMPERATURE: 97.7 F | HEART RATE: 84 BPM | RESPIRATION RATE: 18 BRPM | OXYGEN SATURATION: 98 %

## 2021-11-01 PROCEDURE — G0151 HHCP-SERV OF PT,EA 15 MIN: HCPCS

## 2021-11-01 NOTE — HOME HEALTH
"SUBJECTIVE: patient reports pain still limiting - primarily anterior knee.  reports \"sharp pains\" and \"throbs\" anterior knee  DATE OF NEXT APPOINTMENT WITH DOCTOR: has MD follow up on 11/9/21;   ____________   PLAN FOR NEXT VISIT:   MEDICAL NECESSITY FOR ONGOING SKILLED THERAPY: Face to Face with Dr. Corrales on 10 /22 /2021 who ordered skilled physical therapy for treatment of: gait training, transfer training, pain/edema management following recent R TKA. Requires instruction in appropriate progression of exercises; education in fall prevention/pain/edema management; gait training to reduce reliance on assistive device; balance retraining to prevent falls. Without skilled physical therapy, patient at risk for: falls , chronic pain, long term gait deficits     SPECIFIC INTERVENTIONS AND GOALS TO ADDRESS ON NEXT VISIT:   - increase AROM right knee greater than 11-96 degrees   - review HEP for consistency and quality; progress standing exercises as appropriate   - review pain/edema management strategies   - gait training to restore normal gait mechanics ; progress to cane as appropriate    FREQUENCY AND DURATION: 3 week 2   ANY OTHER FOLLOW UP NEEDED: outpatient PT appt may need to be relocated to avoid interuption in PT services"

## 2021-11-02 ENCOUNTER — HOME CARE VISIT (OUTPATIENT)
Dept: HOME HEALTH SERVICES | Facility: HOME HEALTHCARE | Age: 81
End: 2021-11-02

## 2021-11-02 PROCEDURE — G0151 HHCP-SERV OF PT,EA 15 MIN: HCPCS

## 2021-11-03 ENCOUNTER — READMISSION MANAGEMENT (OUTPATIENT)
Dept: CALL CENTER | Facility: HOSPITAL | Age: 81
End: 2021-11-03

## 2021-11-03 NOTE — HOME HEALTH
SUBJECTIVE: patient reports that he spent the majority of the day yesterday focused on edema management and feels like things are better today  DATE OF NEXT APPOINTMENT WITH DOCTOR: has MD follow up on 11/9/21;   ____________   PLAN FOR NEXT VISIT:   MEDICAL NECESSITY FOR ONGOING SKILLED THERAPY: Face to Face with Dr. Corrales on 10 /22 /2021 who ordered skilled physical therapy for treatment of: gait training, transfer training, pain/edema management following recent R TKA. Requires instruction in appropriate progression of exercises; education in fall prevention/pain/edema management; gait training to reduce reliance on assistive device; balance retraining to prevent falls. Without skilled physical therapy, patient at risk for: falls , chronic pain, long term gait deficits   SPECIFIC INTERVENTIONS AND GOALS TO ADDRESS ON NEXT VISIT:   - increase AROM right knee greater than 11-96 degrees   - review HEP for consistency and quality; progress standing exercises as appropriate   - review pain/edema management strategies   - gait training to restore normal gait mechanics ; progress to cane as appropriate   FREQUENCY AND DURATION: 3 week 2   ANY OTHER FOLLOW UP NEEDED: outpatient PT appt may need to be relocated to avoid interuption in PT services

## 2021-11-03 NOTE — OUTREACH NOTE
Total Joint Week 2 Survey      Responses   [facility] patient discharged from? [place]   Does the patient have one of the following disease processes/diagnoses(primary or secondary)? Total Joint Replacement   Joint surgery performed? Knee   Week 2 attempt successful? Yes   Call start time 1708   Call end time 1713   Has the patient been back in either the hospital or Emergency Department since discharge? No   Discharge diagnosis [place] TOTAL KNEE ARTHROPLASTY    Person spoke with today (if not patient) and relationship wife, Nicol   Does the patient have all medications related to this admission filled (includes all antibiotics, pain medications, etc.) Yes   Is the patient taking all medications as directed (includes completed medication regime)? Yes   Is the patient able to teach back alternate methods of pain control? Ice,  Knee-elevation/no pillow under knee,  Reposition,  Correct alignment   Does the patient have a follow up appointment with their surgeon? Yes   Has the patient kept scheduled appointments due by today? Yes   What is the Home health agency?  [facility]   Has home health visited the patient within 72 hours of discharge? Yes   Psychosocial issues? No   Has the patient began therapy sessions (either in the home or as an out patient)? Yes   Does the patient have a wound vac in place? N/A   Has the patient fallen since discharge? No   Did the patient receive a copy of their discharge instructions? Yes   Nursing interventions Reviewed instructions with patient   What is the patient's perception of their functional status since discharge? Improving   Is the patient able to teach back signs and symptoms of infection? Temp >100.4 for 24h or longer,  Incisional drainage,  Increased swelling or redness around incision (not associated with surgical edema),  Severe discomfort or pain,  Changes in mobility,  Shortness of breath or chest pain   Is the patient able to teach back how to prevent infection?  Check incision daily,  Keep incision covered if drainage,  Wash hands before and after touching incision,  Keep incision covered if pets in house   Is the patient able to teach back signs and symptoms of DVT? Redness in calf,  Area hot to touch,  Shortness of breath or chest pain,  Swelling in calf,  Severe pain in calf   Is the patient able to teach back home safety measures? Ability to shower,  Accessibility to necessary areas in home,  Modifications to reach items,  Modifications with ADLs such as dressing, cooking, toileting   Did the patient implement home safety suggestions from pre-surgery classes if attended? Yes   If the patient is a current smoker, are they able to teach back resources for cessation? Not a smoker   Is the patient/caregiver able to teach back the hierarchy of who to call/visit for symptoms/problems? PCP, Specialist, Home health nurse, Urgent Care, ED, 911 Yes   Additional teach back comments wife states pt progressing well   Week 2 call completed? Yes          Raquel Mcpherson RN

## 2021-11-05 ENCOUNTER — HOME CARE VISIT (OUTPATIENT)
Dept: HOME HEALTH SERVICES | Facility: HOME HEALTHCARE | Age: 81
End: 2021-11-05

## 2021-11-05 PROCEDURE — G0151 HHCP-SERV OF PT,EA 15 MIN: HCPCS

## 2021-11-07 VITALS
TEMPERATURE: 98 F | HEART RATE: 89 BPM | OXYGEN SATURATION: 97 % | DIASTOLIC BLOOD PRESSURE: 82 MMHG | RESPIRATION RATE: 17 BRPM | SYSTOLIC BLOOD PRESSURE: 140 MMHG

## 2021-11-08 NOTE — HOME HEALTH
SUBJECTIVE: Per patient request ,contacted [facility] to obtain outpatient appt sooner than 11/12..  [facility] informed PT that no sooner appts available. after discussion with patient, patient agreeable to try [facility].  Contacted [facility] and obtained appt for 11/10.  Notified patient of need to arrive 15 minutes early and need to cancel [facility] appt. patient states that he will contact [facility] to cancel    DATE OF NEXT APPOINTMENT WITH DOCTOR: Dr Corrales 11/9/21

## 2021-11-09 ENCOUNTER — OFFICE VISIT (OUTPATIENT)
Dept: ORTHOPEDIC SURGERY | Facility: CLINIC | Age: 81
End: 2021-11-09

## 2021-11-09 VITALS — HEIGHT: 70 IN | TEMPERATURE: 97.3 F | WEIGHT: 222 LBS | BODY MASS INDEX: 31.78 KG/M2

## 2021-11-09 DIAGNOSIS — Z96.651 S/P TKR (TOTAL KNEE REPLACEMENT), RIGHT: ICD-10-CM

## 2021-11-09 PROCEDURE — G0180 MD CERTIFICATION HHA PATIENT: HCPCS | Performed by: ORTHOPAEDIC SURGERY

## 2021-11-09 PROCEDURE — 99024 POSTOP FOLLOW-UP VISIT: CPT | Performed by: ORTHOPAEDIC SURGERY

## 2021-11-09 RX ORDER — HYDROCODONE BITARTRATE AND ACETAMINOPHEN 7.5; 325 MG/1; MG/1
TABLET ORAL
Qty: 20 TABLET | Refills: 0 | Status: SHIPPED | OUTPATIENT
Start: 2021-11-09 | End: 2021-12-23 | Stop reason: SDUPTHER

## 2021-11-10 ENCOUNTER — TREATMENT (OUTPATIENT)
Dept: PHYSICAL THERAPY | Facility: CLINIC | Age: 81
End: 2021-11-10

## 2021-11-10 DIAGNOSIS — Z96.651 S/P TKR (TOTAL KNEE REPLACEMENT), RIGHT: Primary | ICD-10-CM

## 2021-11-10 DIAGNOSIS — R26.9 ABNORMAL GAIT: ICD-10-CM

## 2021-11-10 DIAGNOSIS — M25.661 KNEE STIFFNESS, RIGHT: ICD-10-CM

## 2021-11-10 PROCEDURE — 97162 PT EVAL MOD COMPLEX 30 MIN: CPT | Performed by: PHYSICAL THERAPIST

## 2021-11-10 PROCEDURE — 97110 THERAPEUTIC EXERCISES: CPT | Performed by: PHYSICAL THERAPIST

## 2021-11-10 PROCEDURE — 97535 SELF CARE MNGMENT TRAINING: CPT | Performed by: PHYSICAL THERAPIST

## 2021-11-10 PROCEDURE — 97140 MANUAL THERAPY 1/> REGIONS: CPT | Performed by: PHYSICAL THERAPIST

## 2021-11-10 NOTE — PROGRESS NOTES
Physical Therapy Initial Evaluation and Plan of Care    Patient Name: Steve Mace          :  1940  Referring Physician: Juice Sal APRN  Diagnosis: S/P TKR (total knee replacement), right [Z96.651]    Date of Evaluation: 11/10/2021  ______________________________________________________________________    Subjective Evaluation    History of Present Illness  Onset date: Couple of year ago and progressively got worse -   Date of surgery: 10/22/2021.  Mechanism of injury: End stage OA (R) knee -   Better every day -     PMHX:  OA; ; H/O Prostate cancer  Vasculature  Hypertension  Thoracic aortic aneurysm without rupture (HCC)  Coronary artery disease involving native coronary artery of native heart without angina pectoris  Coronary artery disease involving coronary bypass graft of native heart without angina pectoris  Precordial pain  Diabetes HCC  Chronic Renal insufficiency  COPD  Lumbar Disc Disease             Patient Occupation: [employer] - 3-4x/wk -  Pain  Current pain ratin  At worst pain ratin  Location: Anterior knee (R) and thigh - with bruising in thigh  Alleviating factors: Ice; Hydrocodone.  Exacerbated by: Bending > straightening; walking / WB-ing RLE -   Progression: improved    Social Support  Patient lives at: Home with basement - with wife.    Diagnostic Tests  X-ray: normal (Post-op per report)    Treatments  Previous treatment: injection treatment and physical therapy  Current treatment: physical therapy  Patient Goals  Patient/family treatment goals: Pain alleviation; ROM, strength, gait, endurane to allow ADLs and Ret to golf.         ___________________________________________________  Objective          Observations     Additional Knee Observation Details  Pt ambulating with straight cane -   Healing incision anterior (R) Knee - Moderate + swelling anterior knee (R)   Standing w/ flexed (R) knee w/ WS to (L)  Antalgic gait w/ flexed (R) knee and reduced WB-ing RLE -      Tenderness     Additional Tenderness Details  Tender along proximal ITB, quads -   Very tight posterior knee, HS, quad / patellar tendon and scar (R) knee    Active Range of Motion     Right Knee   Flexion: 90 degrees   Extension: 15 degrees     Strength/Myotome Testing     Right Knee   Flexion: 4+  Extension: 4+  Quadriceps contraction: fair    Swelling     Right Knee Girth Measurement (cm)   Joint line: 2cm > (L)        See Treatment Flow sheet for Exercises, Manual therapy, and modalities.   SELF CARE: X 15 min  · TAPING / BRACING: NA  · Anatomy/ soft tissue education for stretching, etc  · Edema control  · Fall Prevention education  · Adjusted st. Cane to fit properly -   · Jt protection, ADL modification;   ___________________________________________________  Assessment & Plan     Assessment  Assessment details: 80 yo male S/P (R) TKA 10/22/2021    PROBLEMS: Pain; Limited ROM; Abnormal gait; Weakness; Intolerance to ADLs and hobby activities requiring use of RLE including ambulation, stairs, squatting, RLE WB-ing activities;   PROGNOSIS: GOOD -     GOALS:   SHORT TERM GOALS: 2 weeks:  1) HEP Initiated; 2) Pain decreased 50%:   3) AROM (R) knee increased 10-deg Flexion; 5-deg Ext:  4) Improved Gait / functional ability RLE w/ AD;     LONG TERM GOALS: 4 weeks (or at time of DISCHARGE): 1) (I) HEP; 2) AROM WFL and pain free; 3) Strength / mobility to be able to perform all ADL's and hobby-related activities w/o restrictions; 4) Gait / endurance to allow ADLs and hobby activities including playing golf -       Plan  Planned therapy interventions: flexibility, gait training, home exercise program, joint mobilization, neuromuscular re-education, soft tissue mobilization, strengthening, stretching and therapeutic activities (Modalities prn; Taping prn; )  Frequency: 3x week  Duration in weeks: 4  Treatment plan discussed with: patient      ___________________________________________________  Manual Therapy:    20      mins  01543;  Therapeutic Exercise:    17     mins  48454;     Neuromuscular Mele:        mins  12462;    Therapeutic Activity:          mins  62254;   Self Care:                      15     mins  57748  Ultrasound:          mins  90891;  Iontophoresis:          mins  75274;    Electrical Stimulation:         mins  35902 ( );  Mechanical Traction:          mins  52844  Dry Needling          mins self-pay    Eval:   20   mins    Timed Treatment:   52   mins                  Total Treatment:     80   mins    PT SIGNATURE:   Shen Hope, PT  DATE TREATMENT INITIATED: 11/10/2021  ___________________________________________________  Initial Certification  Certification Period: 2/8/2022  I certify that the therapy services are furnished while this patient is under my care.  The services outlined above are required by this patient, and will be reviewed every 90 days.     PHYSICIAN: ________________________________  DATE: ______  Juice Sal APRN        Please sign and return via fax to [phone].. Thank you, [facility].  ______________________________________________________________________  [address] [place] [address]  Phone: [phone] Fax: [phone]    Access Code: [email or web address]  URL: [email or web address]  Date: 11/10/2021  Prepared by: Fadi Hope    Exercises  Supine Heel Slide with Strap - 3 x daily - 7 x weekly - 1 sets - 20 reps - 15 sec hold  SUPINE Calf / Knee Ext Stretch w/ Strap - 3-5 x daily - 7 x weekly - 1 sets - 10 reps - 30s hold  Seated Heel Slide - 3-5 x daily - 7 x weekly - 1 sets - 10-15 reps - 15-30s hold

## 2021-11-11 ENCOUNTER — OFFICE VISIT (OUTPATIENT)
Dept: CARDIOLOGY | Facility: CLINIC | Age: 81
End: 2021-11-11

## 2021-11-11 VITALS
DIASTOLIC BLOOD PRESSURE: 82 MMHG | RESPIRATION RATE: 16 BRPM | HEART RATE: 69 BPM | WEIGHT: 219 LBS | BODY MASS INDEX: 31.35 KG/M2 | HEIGHT: 70 IN | OXYGEN SATURATION: 98 % | SYSTOLIC BLOOD PRESSURE: 124 MMHG

## 2021-11-11 DIAGNOSIS — I10 PRIMARY HYPERTENSION: ICD-10-CM

## 2021-11-11 DIAGNOSIS — I25.10 CORONARY ARTERY DISEASE INVOLVING NATIVE CORONARY ARTERY OF NATIVE HEART WITHOUT ANGINA PECTORIS: Primary | ICD-10-CM

## 2021-11-11 DIAGNOSIS — I71.20 THORACIC AORTIC ANEURYSM WITHOUT RUPTURE (HCC): ICD-10-CM

## 2021-11-11 PROCEDURE — 93000 ELECTROCARDIOGRAM COMPLETE: CPT | Performed by: NURSE PRACTITIONER

## 2021-11-11 PROCEDURE — 99214 OFFICE O/P EST MOD 30 MIN: CPT | Performed by: NURSE PRACTITIONER

## 2021-11-11 NOTE — PROGRESS NOTES
Date of Office Visit: 2021  Encounter Provider: Coral Gonzales, LAURO, APRN  Place of Service: [facility] CARDIOLOGY  Patient Name: Steve Mace  :1940        Subjective:     Chief Complaint:  Coronary artery disease per CT scan, hypertension, thoracic aortic dilation      History of Present Illness:  Steve Mace is a 81 y.o. male patient of Dr. Hughes.  I am seeing this patient in the office today and I reviewed his records    Patient has a history of thoracic aortic aneurysm, coronary artery disease, hypertension, renal insufficiency, diabetes, mitral valve prolapse, dyslipidemia, sleep apnea, pulmonary nodule.     In  patient was seen for atypical chest discomfort.  CT scan of the chest showed large pericardial effusion which was treated with nonsteroidals with resolution of symptoms and effusion.  Echo showed left ventricular dilation with EF of 45 to 50%, no significant valvular disease, aortic root 4.3 cm.  PET stress test was negative for ischemia.  By  EF had normalized to 50%.  He was noted to have mitral valve prolapse without regurgitation.  He was noted to have multivessel coronary disease by CT angiography.  May 2017 he had echo showing normal EF of 60%, aortic root of 4.0, ascending aorta of 3.7 cm, aortic valve is trileaflet and functioning normally.  Stress perfusion study was negative for ischemia.  RV appeared large on stress test however was not noted to be large on echo.  CTA of the chest 3/2019 showed mildly dilated ascending aorta unchanged measuring 3.9 cm with mild coronary artery calcifications.  CT 2019 did not mention aortic size.  CT scan 2020 showed stable mild ectasia of thoracic aorta.  Follow-up echo 2021 showed normal LV systolic function with EF of 66%, mild thickening of the aortic valve, trace tricuspid regurgitation with normal RVSP, mild dilation of the ascending aorta at 4.0 cm.  Plan was to repeat CT scan of chest in  1 year.      Patient presents to office today for follow-up appointment.   Significant other with patient in the office today, per patient preference.  Patient reports he is doing well since last visit.  He recently had knee replacement without issue.  In PT and increasing walking amount during the day.  Denies chest pain or discomfort, shortness of breath, shortness of breath with exertion, palpitations, racing heartbeat, dizziness, syncope, near syncope, falls, fatigue, or abnormal bleeding.  BP fairly well controlled in the office today.          Past Medical History:   Diagnosis Date   • Acute myopericarditis    • Arthritis     OSTEOARTHRITIS   • Chronic renal insufficiency    • COPD (chronic obstructive pulmonary disease) ([facility]ly [facility])    • Coronary artery disease    • Diabetes ([facility])    • Hyperlipidemia    • Hypertension    • Knee pain, right    • Knee swelling 2019    HAVE HAD 3 CORTISONE SHOTS   • Lumbosacral disc disease 1970'S   • Mitral valve prolapse    • Prostate cancer (Formerly [facility])    • Sleep apnea     CPAP-SOME USE, PROVIDED EDUCATION ON SAFE SURGERY SALE SLEEP   • Thoracic aortic aneurysm without rupture (Formerly [facility])      Past Surgical History:   Procedure Laterality Date   • COLONOSCOPY     • PROSTATECTOMY     • TOTAL KNEE ARTHROPLASTY Right 10/22/2021    Procedure: TOTAL KNEE ARTHROPLASTY;  Surgeon: Iban Corrales MD;  Location: [facility];  Service: Orthopedics;  Laterality: Right;     Outpatient Medications Prior to Visit   Medication Sig Dispense Refill   • aspirin 81 MG EC tablet Take 1 tab po bid x 14 days; then take 1 tab po daily x 28 days 60 tablet 0   • atorvastatin (LIPITOR) 40 MG tablet Take 40 mg by mouth Daily.     • HYDROcodone-acetaminophen (NORCO) 7.5-325 MG per tablet 1-2 po q 4-6 hr prn pain.  Take 2 only for severe pain. 20 tablet 0   • losartan (COZAAR) 50 MG tablet Take 50 mg by mouth Daily.     • metFORMIN (GLUCOPHAGE) 1000 MG tablet Take 1,000 mg by mouth 2 (Two) Times a Day With Meals.     •  metoprolol tartrate (LOPRESSOR) 50 MG tablet Take 25 mg by mouth Daily.     • SYMBICORT 160-4.5 MCG/ACT inhaler Inhale 2 puffs 2 (Two) Times a Day. 2 PUFFS IN THE AM 1 PUFF AT BEDTIME     • ondansetron (Zofran) 4 MG tablet Take 1 tablet by mouth Every 8 (Eight) Hours As Needed for Nausea or Vomiting for up to 10 doses. 10 tablet 0     Facility-Administered Medications Prior to Visit   Medication Dose Route Frequency Provider Last Rate Last Admin   • Chlorhexidine Gluconate Cloth 2 % pads   Apply externally BID Mindy Bustos APRN       • Chlorhexidine Gluconate Cloth 2 % pads   Apply externally Take As Directed Iban Corrales MD           Allergies as of 2021   • (No Known Allergies)     Social History     Socioeconomic History   • Marital status:    Tobacco Use   • Smoking status: Former Smoker     Packs/day: 1.00     Years: 20.00     Pack years: 20.00     Types: Cigarettes     Quit date: 10/18/1996     Years since quittin.0   • Smokeless tobacco: Never Used   Vaping Use   • Vaping Use: Never used   Substance and Sexual Activity   • Alcohol use: Yes     Alcohol/week: 0.0 standard drinks     Comment: FEW DRINKS A NIGHT   • Drug use: No   • Sexual activity: Defer     Family History   Problem Relation Age of Onset   • Heart attack Father    • Diabetes Father    • Heart disease Brother    • Heart attack Brother    • Diabetes Other    • Diabetes Maternal Grandfather    • Malig Hyperthermia Neg Hx        Review of Systems   Constitutional: Negative for malaise/fatigue.   Cardiovascular:        SEE HPI   Respiratory: Negative for shortness of breath.    Hematologic/Lymphatic: Negative for bleeding problem.   Musculoskeletal: Negative for falls.   Gastrointestinal: Negative for melena.   Genitourinary: Negative for hematuria.   Neurological: Negative for dizziness.   Psychiatric/Behavioral: Negative for altered mental status.          Objective:     Vitals:    21 0824   BP: 124/82   BP Location:  "Right arm   Patient Position: Sitting   Cuff Size: Adult   Pulse: 69   Resp: 16   SpO2: 98%   Weight: 99.3 kg (219 lb)   Height: 177.8 cm (70\")     Body mass index is 31.42 kg/m².      PHYSICAL EXAM:  Constitutional:       General: Not in acute distress.     Appearance: Well-developed. Not diaphoretic.   Eyes:      Pupils: Pupils are equal, round, and reactive to light.   HENT:      Head: Normocephalic and atraumatic.   Neck:      Vascular: No carotid bruit or JVD.   Pulmonary:      Effort: Pulmonary effort is normal. No respiratory distress.      Breath sounds: Normal breath sounds. No wheezing. No rales.   Cardiovascular:      Normal rate. Regular rhythm.      Murmurs: There is no murmur.      No gallop. No click. No rub.   Edema:     Peripheral edema absent.   Abdominal:      General: Bowel sounds are normal. There is no distension.      Palpations: Abdomen is soft.   Musculoskeletal: Normal range of motion.         General: No tenderness or deformity.      Cervical back: Neck supple. Skin:     General: Skin is warm and dry.      Findings: No erythema or rash.   Neurological:      Mental Status: Alert and oriented to person, place, and time.   Psychiatric:         Behavior: Behavior normal.         Judgment: Judgment normal.             ECG 12 Lead    Date/Time: 11/11/2021 8:31 AM  Performed by: Coral Gonzales DNP, APRN  Authorized by: Coral Gonzales DNP, APRN   Comparison: compared with previous ECG from 5/10/2021  Rhythm: sinus rhythm  BPM: 69  Comments: No significant changes from previous EKG              Assessment:       Diagnosis Plan   1. Coronary artery disease involving native coronary artery of native heart without angina pectoris     2. Thoracic aortic aneurysm without rupture (HCC)     3. Primary hypertension           Plan:     1. Thoracic aortic aneurysm: Stable since 2015 on 5/2020 CT.  Stable on 5/2021 echo.  Plan for repeat CT chest in 1 year per MD  2. Coronary artery disease per CT " scan: Negative stress test 5/2017.  Denies anginal symptoms.  Continue with aggressive risk factor modification.  3. Hypertension: BP fairly well controlled on office today.  Patient to monitor at home and call if staying above 130/80 on average.   4. Chronic renal insufficiency  5. Diabetes: Managed by outside provider  6. Dyslipidemia: Managed by outside provider.  LDL goal less than 70.  7. Normal vascular screening 7/2020  8. Untreated obstructive sleep apnea  9. Pulmonary nodule    Patient to keep May follow-up with Dr. Hughes as scheduled or follow-up sooner if needed for any new, recurrent, or worsening symptoms or other issues or concerns.  Discussed in detail signs/symptoms that warrant sooner call or follow-up to the office or ER visit.        Records reviewed including but not limited to 6/2021 echo, 5/2017 stress test, 5/2020 CT chest.           Your medication list          Accurate as of November 11, 2021  8:33 AM. If you have any questions, ask your nurse or doctor.            CONTINUE taking these medications      Instructions Last Dose Given Next Dose Due   aspirin 81 MG EC tablet      Take 1 tab po bid x 14 days; then take 1 tab po daily x 28 days       atorvastatin 40 MG tablet  Commonly known as: LIPITOR      Take 40 mg by mouth Daily.       HYDROcodone-acetaminophen 7.5-325 MG per tablet  Commonly known as: NORCO      1-2 po q 4-6 hr prn pain.  Take 2 only for severe pain.       losartan 50 MG tablet  Commonly known as: COZAAR      Take 50 mg by mouth Daily.       metFORMIN 1000 MG tablet  Commonly known as: GLUCOPHAGE      Take 1,000 mg by mouth 2 (Two) Times a Day With Meals.       metoprolol tartrate 50 MG tablet  Commonly known as: LOPRESSOR      Take 25 mg by mouth Daily.       Symbicort 160-4.5 MCG/ACT inhaler  Generic drug: budesonide-formoterol      Inhale 2 puffs 2 (Two) Times a Day. 2 PUFFS IN THE AM 1 PUFF AT BEDTIME              The above medication changes may not have been made by  this provider.  Patient's medication list was updated to reflect medications they are currently taking including medication changes made by other providers.            Thanks,    Coral Gonzales, DNP, APRN  11/11/2021         Dictated utilizing Dragon dictation

## 2021-11-12 ENCOUNTER — TREATMENT (OUTPATIENT)
Dept: PHYSICAL THERAPY | Facility: CLINIC | Age: 81
End: 2021-11-12

## 2021-11-12 DIAGNOSIS — Z74.09 IMPAIRED FUNCTIONAL MOBILITY, BALANCE, GAIT, AND ENDURANCE: ICD-10-CM

## 2021-11-12 DIAGNOSIS — R26.9 ABNORMAL GAIT: ICD-10-CM

## 2021-11-12 DIAGNOSIS — Z96.651 S/P TKR (TOTAL KNEE REPLACEMENT), RIGHT: Primary | ICD-10-CM

## 2021-11-12 DIAGNOSIS — M25.661 KNEE STIFFNESS, RIGHT: ICD-10-CM

## 2021-11-12 PROCEDURE — 97530 THERAPEUTIC ACTIVITIES: CPT | Performed by: PHYSICAL THERAPIST

## 2021-11-12 PROCEDURE — 97110 THERAPEUTIC EXERCISES: CPT | Performed by: PHYSICAL THERAPIST

## 2021-11-12 PROCEDURE — 97140 MANUAL THERAPY 1/> REGIONS: CPT | Performed by: PHYSICAL THERAPIST

## 2021-11-12 NOTE — PROGRESS NOTES
"Physical Therapy Daily Progress Note    Patient Name: Steve Mace         :  1940  Referring Physician: Juice Sal APRN      Subjective   Steve Mace reports: sore yesterday - not able to do many exercises - feeling better today -    Objective   Pt ambulating with increased knee flexion and increased WB-ing RLE w/ AD -   Knee extension down to 10-08-deg after MT and stretching  Knee flexion up to 107-deg after MT and gentle stretching   Improved functional ROM w/ gait, transfers, etc.       See Exercise, Manual, and Modality Logs for complete treatment.     Functional / Therapeutic Activities:  08 min  · TAPING / BRACING: NA  · Functional assessment (R) knee   · Jt protection, ADL modification; Posture and      Assessment/Plan  80 yo male S/P (R) TKA 10/22/2021  Doing well - stiff into flexion, but improved w/ MT and gentle stretching - would benefit from more consistent stretching at home - throughout the day -     Progress strengthening /stabilization /functional activity - trial prone knee flexion and \"torture chair\" stretching next session.          _________________________________________________    Manual Therapy:            20     mins  13459;  Therapeutic Exercise:    28    mins  03297;     Neuromuscular Mele:        mins  54063;    Therapeutic Activity:     08      mins  30880;     Ultrasound:                          mins  84719;  Iontophoresis:                     mins  96691;    Electrical Stimulation:         mins  45201 ( );  Mechanical Traction:          mins  28574  Dry Needling                       mins self-pay     Timed Treatment:   56    mins                  Total Treatment:     70    mins    Shen Hope, PT  Physical Therapist  "

## 2021-11-15 ENCOUNTER — TELEPHONE (OUTPATIENT)
Dept: PHYSICAL THERAPY | Facility: CLINIC | Age: 81
End: 2021-11-15

## 2021-11-15 NOTE — TELEPHONE ENCOUNTER
IF PEE HAS ANYTHING BECOME AVAILABLE FOR THIS WEDNESDAY 11/17 ANY EARLIER THAN THE PTS ALREADY SCHEDULED 5:00 APPT PLEASE GIVE THE PATIENT  A CALL

## 2021-11-17 ENCOUNTER — READMISSION MANAGEMENT (OUTPATIENT)
Dept: CALL CENTER | Facility: HOSPITAL | Age: 81
End: 2021-11-17

## 2021-11-17 ENCOUNTER — TREATMENT (OUTPATIENT)
Dept: PHYSICAL THERAPY | Facility: CLINIC | Age: 81
End: 2021-11-17

## 2021-11-17 DIAGNOSIS — R26.9 ABNORMAL GAIT: ICD-10-CM

## 2021-11-17 DIAGNOSIS — M25.661 KNEE STIFFNESS, RIGHT: ICD-10-CM

## 2021-11-17 DIAGNOSIS — Z74.09 IMPAIRED FUNCTIONAL MOBILITY, BALANCE, GAIT, AND ENDURANCE: ICD-10-CM

## 2021-11-17 DIAGNOSIS — Z96.651 S/P TKR (TOTAL KNEE REPLACEMENT), RIGHT: Primary | ICD-10-CM

## 2021-11-17 PROCEDURE — 97110 THERAPEUTIC EXERCISES: CPT | Performed by: PHYSICAL THERAPIST

## 2021-11-17 PROCEDURE — 97140 MANUAL THERAPY 1/> REGIONS: CPT | Performed by: PHYSICAL THERAPIST

## 2021-11-17 NOTE — OUTREACH NOTE
Total Joint Month 1 Survey      Responses   [facility], [place], operated by [facility] patient discharged from? [place]   Does the patient have one of the following disease processes/diagnoses(primary or secondary)? Total Joint Replacement   Joint surgery performed? Knee   Month 1 attempt successful? Yes   Call start time 1444   Call end time 1447   Has the patient been back in either the hospital or Emergency Department since discharge? No   Is the patient taking all medications as directed (includes completed medication regime)? Yes   Is the patient able to teach back alternate methods of pain control? Ice,  Knee-elevation/no pillow under knee,  Reposition,  Correct alignment   Has the patient kept scheduled appointments due by today? Yes   Is the patient still receiving Home Health Services? No   Is the patient still attending therapy sessions(either in the home or as an outpatient)? Yes  [Outpatient PT.]   Has the patient fallen since discharge? No   What is the patient's perception of their functional status since discharge? Improving   Is the patient/caregiver able to teach back the hierarchy of who to call/visit for symptoms/problems? PCP, Specialist, Home health nurse, Urgent Care, ED, 911 Yes   Month 1 call completed? Yes   Wrap up additional comments Patient states is doing well. Denies any needs or any problems today.          Bettie Tafoya RN

## 2021-11-19 ENCOUNTER — TREATMENT (OUTPATIENT)
Dept: PHYSICAL THERAPY | Facility: CLINIC | Age: 81
End: 2021-11-19

## 2021-11-19 DIAGNOSIS — Z96.651 S/P TKR (TOTAL KNEE REPLACEMENT), RIGHT: Primary | ICD-10-CM

## 2021-11-19 DIAGNOSIS — Z74.09 IMPAIRED FUNCTIONAL MOBILITY, BALANCE, GAIT, AND ENDURANCE: ICD-10-CM

## 2021-11-19 DIAGNOSIS — M25.661 KNEE STIFFNESS, RIGHT: ICD-10-CM

## 2021-11-19 DIAGNOSIS — R26.9 ABNORMAL GAIT: ICD-10-CM

## 2021-11-19 PROCEDURE — 97110 THERAPEUTIC EXERCISES: CPT | Performed by: PHYSICAL THERAPIST

## 2021-11-19 PROCEDURE — 97530 THERAPEUTIC ACTIVITIES: CPT | Performed by: PHYSICAL THERAPIST

## 2021-11-19 PROCEDURE — 97140 MANUAL THERAPY 1/> REGIONS: CPT | Performed by: PHYSICAL THERAPIST

## 2021-11-19 NOTE — PROGRESS NOTES
Physical Therapy Daily Progress Note    Patient Name: Steve Mace         :  1940  Referring Physician: Juice Sal APRN      Subjective   Steve Mace reports: doing better overall - stiff, but not as bad    Objective   Pt ambulating w. AD, but less dependant on it and even forgets it at times -   Knee flexion up to 110-deg -    See Exercise, Manual, and Modality Logs for complete treatment.     Functional / Therapeutic Activities:  08 min  · TAPING / BRACING: NA  · SEE EXERCISE FLOW SHEET -   · Jt protection, ADL modification; Posture and      Assessment/Plan  80 yo male S/P (R) TKA 10/22/2021  Doing well - stiff into flexion, but improved w/ MT and gentle stretching - would benefit from more consistent stretching at home - throughout the day -      Progress strengthening /stabilization /functional activity -   _________________________________________________     Manual Therapy:            20     mins  10193;  Therapeutic Exercise:    28    mins  18124;     Neuromuscular Mele:        mins  25191;    Therapeutic Activity:     08      mins  04531;     Ultrasound:                          mins  26272;  Iontophoresis:                     mins  19654;    Electrical Stimulation:         mins  30828 ( );  Mechanical Traction:          mins  10487  MHP (start)                  10     minsNC     Timed Treatment:   56    mins                  Total Treatment:     75    mins  Shen Hope, PT  Physical Therapist

## 2021-11-23 ENCOUNTER — TREATMENT (OUTPATIENT)
Dept: PHYSICAL THERAPY | Facility: CLINIC | Age: 81
End: 2021-11-23

## 2021-11-23 DIAGNOSIS — Z74.09 IMPAIRED FUNCTIONAL MOBILITY, BALANCE, GAIT, AND ENDURANCE: ICD-10-CM

## 2021-11-23 DIAGNOSIS — R26.9 ABNORMAL GAIT: Primary | ICD-10-CM

## 2021-11-23 DIAGNOSIS — M25.661 KNEE STIFFNESS, RIGHT: ICD-10-CM

## 2021-11-23 PROCEDURE — 97140 MANUAL THERAPY 1/> REGIONS: CPT | Performed by: PHYSICAL THERAPIST

## 2021-11-23 PROCEDURE — 97110 THERAPEUTIC EXERCISES: CPT | Performed by: PHYSICAL THERAPIST

## 2021-11-23 NOTE — PROGRESS NOTES
Physical Therapy Daily Progress Note    Patient Name: Steve Mace         :  1940  Referring Physician: Juice Sal APRN      Subjective   Steve Mace reports: swelling and stiff - on his feet a lot - wants to sit inchair the rest of the day today -     Objective   Pt encouraged not to sit in chair, but to keep knee moving w/ exercises - sitting all day only makes his knee stiff -   Knee diffusely swollen (R) - Pockets of swelling ant/sup especially -     See Exercise, Manual, and Modality Logs for complete treatment.     Functional / Therapeutic Activities:  08 min  · TAPING / BRACING: NA  · SEE EXERCISE FLOW SHEET -   · Jt protection, ADL modification; Posture and      Assessment/Plan  82 yo male S/P (R) TKA 10/22/2021  Doing well - stiff into flexion, but improved w/ MT and gentle stretching - would benefit from more consistent stretching at home - throughout the day     Progress strengthening /stabilization /functional activity       _________________________________________________    Manual Therapy:            12     mins  24931;  Therapeutic Exercise:    30    mins  73718;     Neuromuscular Mele:        mins  90575;    Therapeutic Activity:       08    mins  34213;     Ultrasound:                          mins  56110;  Iontophoresis:                     mins  66683;    Electrical Stimulation:         mins  78681 ( );  Mechanical Traction:         mins  41914  Dry Needling                       mins self-pay     Timed Treatment:   50    mins                  Total Treatment:     60    mins    Shen Hope, PT  Physical Therapist

## 2021-11-23 NOTE — PROGRESS NOTES
Physical Therapy Daily Progress Note    Patient Name: Steve Mace         :  1940  Referring Physician: Juice Sal APRN      Subjective   Steve Mace reports: Stiffness persistent - frustrated -   AFTER PT - Pt noted feeling much better after PT - will be more consistent w/ stretching    Objective   Pt ambulating w/ AD, but left w/o using it and had to come back to get it -   Initially very stiff - 70-deg flexion but up to 108-deg    See Exercise, Manual, and Modality Logs for complete treatment.     Functional / Therapeutic Activities:   min  · TAPING / BRACING: NA  · Jt protection, ADL modification; Posture and      Assessment/Plan  80 yo male S/P (R) TKA 10/22/2021  Doing well - stiff into flexion, but improved w/ MT and gentle stretching - would benefit from more consistent stretching at home - throughout the day -     Progress strengthening /stabilization /functional activity - emphasis on MT first then stretching in that order -        _________________________________________________    Manual Therapy:            20     mins  18737;  Therapeutic Exercise:    28    mins  41684;     Neuromuscular Mele:        mins  08372;    Therapeutic Activity:           mins  74719;     Ultrasound:                          mins  88686;  Iontophoresis:                     mins  94437;    Electrical Stimulation:         mins  83212 ( );  Mechanical Traction:          mins  71916  Dry Needling                       mins self-pay     Timed Treatment:   48    mins                  Total Treatment:     60    mins    Shen Hope PT  Physical Therapist

## 2021-11-24 ENCOUNTER — TREATMENT (OUTPATIENT)
Dept: PHYSICAL THERAPY | Facility: CLINIC | Age: 81
End: 2021-11-24

## 2021-11-24 DIAGNOSIS — R26.9 ABNORMAL GAIT: ICD-10-CM

## 2021-11-24 DIAGNOSIS — M25.661 KNEE STIFFNESS, RIGHT: ICD-10-CM

## 2021-11-24 DIAGNOSIS — Z74.09 IMPAIRED FUNCTIONAL MOBILITY, BALANCE, GAIT, AND ENDURANCE: ICD-10-CM

## 2021-11-24 DIAGNOSIS — Z96.651 S/P TKR (TOTAL KNEE REPLACEMENT), RIGHT: Primary | ICD-10-CM

## 2021-11-24 PROCEDURE — 97140 MANUAL THERAPY 1/> REGIONS: CPT | Performed by: PHYSICAL THERAPIST

## 2021-11-24 PROCEDURE — 97110 THERAPEUTIC EXERCISES: CPT | Performed by: PHYSICAL THERAPIST

## 2021-11-24 NOTE — PROGRESS NOTES
Physical Therapy Daily Progress Note    Patient Name: Steve Mace         :  1940  Referring Physician: Juice Sal APRN      Subjective   Steve Mace reports feeling much better after PT yesterday - Unable to stay for full session due to having another MD appointment this morning -     Objective   (R) knee swollen - tight posterior knee, HS, gastroc -   Up to 115-deg flexion (R) knee w/ discomfort -    See Exercise, Manual, and Modality Logs for complete treatment. (UNABLE TO COMPLETE FULL PROGRAM DUE TO HAVING TO LEAVE EARLY FOR A MD APPT)     Functional / Therapeutic Activities:   min  · TAPING / BRACING: NA  · Jt protection, ADL modification; Posture and      Assessment/Plan  80 yo male S/P (R) TKA 10/22/2021  Doing well - stiff into flexion, but improved w/ MT and gentle stretching - would benefit from more consistent stretching at home - throughout the day      Progress strengthening /stabilization /functional activity - resume full program next session     _________________________________________________     Manual Therapy:            20     mins  68497;  Therapeutic Exercise:   15    mins  66492;     Neuromuscular Mele:        mins  26061;    Therapeutic Activity:           mins  43665;     Ultrasound:                          mins  63990;  Iontophoresis:                     mins  92978;    Electrical Stimulation:         mins  58836 ( );  Mechanical Traction:         mins  97622  Dry Needling                       mins self-pay     Timed Treatment: 35    mins                  Total Treatment:     40    mins    Shen Hope PT  Physical Therapist

## 2021-11-30 ENCOUNTER — TREATMENT (OUTPATIENT)
Dept: PHYSICAL THERAPY | Facility: CLINIC | Age: 81
End: 2021-11-30

## 2021-11-30 DIAGNOSIS — Z96.651 S/P TKR (TOTAL KNEE REPLACEMENT), RIGHT: Primary | ICD-10-CM

## 2021-11-30 DIAGNOSIS — R26.9 ABNORMAL GAIT: ICD-10-CM

## 2021-11-30 DIAGNOSIS — M25.661 KNEE STIFFNESS, RIGHT: ICD-10-CM

## 2021-11-30 DIAGNOSIS — Z74.09 IMPAIRED FUNCTIONAL MOBILITY, BALANCE, GAIT, AND ENDURANCE: ICD-10-CM

## 2021-11-30 PROCEDURE — 97110 THERAPEUTIC EXERCISES: CPT | Performed by: PHYSICAL THERAPIST

## 2021-11-30 PROCEDURE — 97140 MANUAL THERAPY 1/> REGIONS: CPT | Performed by: PHYSICAL THERAPIST

## 2021-11-30 NOTE — PROGRESS NOTES
Physical Therapy Daily Progress Note    Patient Name: Steve Mace         :  1940  Referring Physician: Juice Sal APRN      Subjective   Steve Mace reports: continued improvement - decreasing pain and improving mobility and function - Persistent swelling, but improved     Objective   Pt ambulating with no AD - (R) Knee mod swelling - pocket of swelling  Flexion up to 118-deg    See Exercise, Manual, and Modality Logs for complete treatment.     Functional / Therapeutic Activities:  05 min  · TAPING / BRACING: NA  · SEE EXERCISE FLOW SHEET -   · Jt protection, ADL modification; Posture and      Assessment/Plan  80 yo male S/P (R) TKA 10/22/2021  Doing well - stiff into flexion, but improved w/ MT and gentle stretching - would benefit from more consistent stretching at home - throughout the day -     Progress strengthening /stabilization /functional activity -      _________________________________________________    Manual Therapy:            15     mins  14261;  Therapeutic Exercise:    30    mins  14917;     Neuromuscular Mele:        mins  09665;    Therapeutic Activity:       05    mins  71631;     Ultrasound:                          mins  06659;  Iontophoresis:                     mins  72743;    Electrical Stimulation:         mins  45053 ( );  Mechanical Traction:         mins  37404  Dry Needling                       mins self-pay     Timed Treatment:   50    mins                  Total Treatment:     60    mins    Shen Hope, PT  Physical Therapist

## 2021-12-01 ENCOUNTER — TREATMENT (OUTPATIENT)
Dept: PHYSICAL THERAPY | Facility: CLINIC | Age: 81
End: 2021-12-01

## 2021-12-01 DIAGNOSIS — Z74.09 IMPAIRED FUNCTIONAL MOBILITY, BALANCE, GAIT, AND ENDURANCE: ICD-10-CM

## 2021-12-01 DIAGNOSIS — Z96.651 S/P TKR (TOTAL KNEE REPLACEMENT), RIGHT: Primary | ICD-10-CM

## 2021-12-01 DIAGNOSIS — M25.661 KNEE STIFFNESS, RIGHT: ICD-10-CM

## 2021-12-01 DIAGNOSIS — R26.9 ABNORMAL GAIT: ICD-10-CM

## 2021-12-01 PROCEDURE — 97110 THERAPEUTIC EXERCISES: CPT | Performed by: PHYSICAL THERAPIST

## 2021-12-01 PROCEDURE — 97530 THERAPEUTIC ACTIVITIES: CPT | Performed by: PHYSICAL THERAPIST

## 2021-12-01 PROCEDURE — 97140 MANUAL THERAPY 1/> REGIONS: CPT | Performed by: PHYSICAL THERAPIST

## 2021-12-01 RX ORDER — ASPIRIN 81 MG/1
TABLET ORAL
Qty: 60 TABLET | Refills: 0 | OUTPATIENT
Start: 2021-12-01

## 2021-12-01 NOTE — PROGRESS NOTES
Physical Therapy Daily Progress Note    Patient Name: Steve Mace         :  1940  Referring Physician: Juice Sal APRN      Subjective   Steve Mace reports: his knee felt so good after last session, that it felt almost normal - Notes soreness at night that keeps him awake, but not bad during the day - Swelling better overall as well - Improved ability to get in/out of car, etc and to do stairs -     Objective   Swelling pocket ant/sup/med knee (R) - Scar most tight inferior aspect with knee flexion -   Knee flexion up to 117-118-deg after stretching and MT -     See Exercise, Manual, and Modality Logs for complete treatment.     Functional / Therapeutic Activities:  15 min  · TAPING / BRACING: NA  · SEE EXERCISE FLOW SHEET -   · Jt protection, ADL modification; Posture and      Assessment/Plan  80 yo male S/P (R) TKA 10/22/2021  Doing well - stiff into flexion, but improved w/ MT and gentle stretching - benefitting from more consistent stretching at home - throughout the day -  Improved functional ability -     Progress per Plan of Care - add SLS, Wobble board, etc       _________________________________________________    Manual Therapy:            12     mins  25110;  Therapeutic Exercise:    28    mins  36512;     Neuromuscular Mele:        mins  00191;    Therapeutic Activity:     15      mins  70451;     Ultrasound:                          mins  29277;  Iontophoresis:                     mins  76296;    Electrical Stimulation:         mins  58849 ( );  Mechanical Traction:          mins  65337  MHP (at start)                 10      mins NC     Timed Treatment:   55    mins                  Total Treatment:     80    mins    Shen Hope PT  Physical Therapist  
Pt admitted with CVA. Currently on a DASH diet, tolerating it well with good intake per flowsheets.

## 2021-12-03 ENCOUNTER — TREATMENT (OUTPATIENT)
Dept: PHYSICAL THERAPY | Facility: CLINIC | Age: 81
End: 2021-12-03

## 2021-12-03 DIAGNOSIS — R26.9 ABNORMAL GAIT: ICD-10-CM

## 2021-12-03 DIAGNOSIS — Z96.651 S/P TKR (TOTAL KNEE REPLACEMENT), RIGHT: Primary | ICD-10-CM

## 2021-12-03 DIAGNOSIS — M25.661 KNEE STIFFNESS, RIGHT: ICD-10-CM

## 2021-12-03 DIAGNOSIS — Z74.09 IMPAIRED FUNCTIONAL MOBILITY, BALANCE, GAIT, AND ENDURANCE: ICD-10-CM

## 2021-12-03 PROCEDURE — 97140 MANUAL THERAPY 1/> REGIONS: CPT | Performed by: PHYSICAL THERAPIST

## 2021-12-03 PROCEDURE — 97110 THERAPEUTIC EXERCISES: CPT | Performed by: PHYSICAL THERAPIST

## 2021-12-03 PROCEDURE — 97530 THERAPEUTIC ACTIVITIES: CPT | Performed by: PHYSICAL THERAPIST

## 2021-12-03 NOTE — PROGRESS NOTES
------------------------------------------------------------------------------------------------------   MD PROGRESS NOTE    Patient: Steve Mace        : 1940  Diagnosis/ICD-10 Code:  S/P TKR (total knee replacement), right [Z96.651]  Referring practitioner: CRYS Shah  Date of Initial Visit: 11/10/2021                  Today's Date: 12/3/2021  _________________________________________________________________    Thank you for the referral of Mr. Mace to [facility].  Mr. Mace has attended 10 PT sessions and their treatment has consisted of: modalities prn, manual therapy, therapeutic exercise, patient education, and HEP.     Subjective   Steve Mace reports: continued improvement with decreased pain and improved mobility and function - notes weakness w/ stairs - decreasing swelling noted as well -   ___________________________________________________________________  Objective              OBSERVATION: Pt ambulating w/o AD w/ increased WB-ing RLE and less antalgic              PALPATION: Scar less tender and tight - tightness posterior knee into HS, but improved -        AROM: (R) Knee: Up to 120-deg Flexion and near full knee ext after MT and gentle stretching   STRENGTH: SLR w/min lag   DTR's/SENSATION: Grossly intact RLE   SPECIAL TESTS: NA   ACTIVITY TOLERANCE: Improving tolerance to ADLs, but limited by weakness / endurance -      See Exercise, Manual, and Modality Logs for complete treatment.      Functional / Therapeutic Activities:  X 23 min  · TAPING / BRACING: NA  · SEE EXERCISE FLOW SHEET -   · FUNCTIONAL ASSESSMENT -    · Jt protection, ADL modification; Posture and    ___________________________________________________________________   Assessment/Plan  82 yo male S/P (R) TKA 10/22/2021  Doing well -  - benefitting from more consistent stretching at home - throughout the day -  GOAL STATUS:   STGs - ALL MET  LTGs - progressing towards  mobility, gait, strength / function goals -   Mr. Mcae would benefit from continued Physical Therapy.     P: Recommend continued Physical Therapy to allow a full and safe return to ADL's and normal job duties  2 times/wk x 4weeks.    Please advise after your exam.    Thank you again for this referral of Mr. Mace to [facility].    PT Signature: ______________________________   Shen Hope, PT  ______________________________________________________  Based upon review of the patient's progress and continued therapy plan, it is my medical opinion that Steve Mace should continue physical therapy treatment per the recommendation above.     Signature: ____________________________   Date:  _______   Juice Sal APRN  ____________________________________________________________________  Manual Therapy:    08     mins  18933;  Therapeutic Exercise:    25     mins  72247;     Neuromuscular Mele:        mins  63596;    Therapeutic Activity:     23     mins  42967;   Self Care:                           mins  61460  Ultrasound:          mins  93694;  Iontophoresis:          mins  60578;    Electrical Stimulation:         mins  77366 ( );  Mechanical Traction:          mins  62903  Dry Needling          mins self-pay    Timed Treatment:   56   mins                  Total Treatment:     70   mins    ______________________________________________________________________  [address] [place] [address]  Phone: [phone] Fax: [phone]

## 2021-12-07 ENCOUNTER — TREATMENT (OUTPATIENT)
Dept: PHYSICAL THERAPY | Facility: CLINIC | Age: 81
End: 2021-12-07

## 2021-12-07 DIAGNOSIS — Z74.09 IMPAIRED FUNCTIONAL MOBILITY, BALANCE, GAIT, AND ENDURANCE: ICD-10-CM

## 2021-12-07 DIAGNOSIS — R26.9 ABNORMAL GAIT: ICD-10-CM

## 2021-12-07 DIAGNOSIS — M25.661 KNEE STIFFNESS, RIGHT: ICD-10-CM

## 2021-12-07 DIAGNOSIS — Z96.651 S/P TKR (TOTAL KNEE REPLACEMENT), RIGHT: Primary | ICD-10-CM

## 2021-12-07 PROCEDURE — 97530 THERAPEUTIC ACTIVITIES: CPT | Performed by: PHYSICAL THERAPIST

## 2021-12-07 PROCEDURE — 97110 THERAPEUTIC EXERCISES: CPT | Performed by: PHYSICAL THERAPIST

## 2021-12-07 NOTE — PROGRESS NOTES
Physical Therapy Daily Progress Note    Patient Name: Steve Mace         :  1940  Referring Physician: Juice Sal APRN      Subjective   Steve Mace reports: continued overall improvement with decreasing pain and improved mobility and decreased swelling - Residual stiffness/soreness, but improved functional ability - RLE feels week with up stairs -     Objective   Pt ambulating w/o AD and increased WB-ing RLE - Pt able to ambulate up/down flight of stairs reciprocally w/ hand rail support - initially difficult stepping up w/ RLE, but improved w/ reps -     See Exercise, Manual, and Modality Logs for complete treatment.     Functional / Therapeutic Activities:  15 min  · TAPING / BRACING: NA  · SEE EXERCISE FLOW SHEET -   · Jt protection, ADL modification; Posture and       Assessment/Plan  82 yo male S/P (R) TKA 10/22/2021  Doing well - stiff into flexion, but improved w/ MT and gentle stretching - benefitting from more consistent stretching at home - throughout the day -  Improved functional ability -      Progress per Plan of Care - add Side step, Monster Walk as dawna     _________________________________________________     Manual Therapy:                 mins  59126;  Therapeutic Exercise:    30    mins  58203;     Neuromuscular Mele:        mins  32103;    Therapeutic Activity:     15      mins  04260;     Ultrasound:                          mins  48782;  Iontophoresis:                     mins  49408;    Electrical Stimulation:         mins  86981 ( );  Mechanical Traction:          mins  56351  MHP (at start)                       mins NC     Timed Treatment:   45    mins                  Total Treatment:     60    mins     Shen Hope PT  Physical Therapist

## 2021-12-10 ENCOUNTER — TREATMENT (OUTPATIENT)
Dept: PHYSICAL THERAPY | Facility: CLINIC | Age: 81
End: 2021-12-10

## 2021-12-10 DIAGNOSIS — R26.9 ABNORMAL GAIT: ICD-10-CM

## 2021-12-10 DIAGNOSIS — Z96.651 S/P TKR (TOTAL KNEE REPLACEMENT), RIGHT: Primary | ICD-10-CM

## 2021-12-10 DIAGNOSIS — Z74.09 IMPAIRED FUNCTIONAL MOBILITY, BALANCE, GAIT, AND ENDURANCE: ICD-10-CM

## 2021-12-10 DIAGNOSIS — M25.661 KNEE STIFFNESS, RIGHT: ICD-10-CM

## 2021-12-10 PROCEDURE — 97530 THERAPEUTIC ACTIVITIES: CPT | Performed by: PHYSICAL THERAPIST

## 2021-12-10 PROCEDURE — 97110 THERAPEUTIC EXERCISES: CPT | Performed by: PHYSICAL THERAPIST

## 2021-12-10 PROCEDURE — 97140 MANUAL THERAPY 1/> REGIONS: CPT | Performed by: PHYSICAL THERAPIST

## 2021-12-10 NOTE — PROGRESS NOTES
Physical Therapy Daily Progress Note    Patient Name: Steve Mace         :  1940  Referring Physician: Juice Sal APRN      Subjective   Steve Mace reports: continued improvement w/ decreasing pain and improving mobility - residual stiffness    Objective   Pt ambulating w/ flexed knee posture (R) - Tight posterior knee into distal HS - w/ TPs -   Lacking full extension until after MT then Pt achieved near full extension -     See Exercise, Manual, and Modality Logs for complete treatment.     Functional / Therapeutic Activities:  10 min  · TAPING / BRACING: NA  · SEE EXERCISE FLOW SHEET -   · Jt protection, ADL modification; Posture and      Assessment/Plan  80 yo male S/P (R) TKA 10/22/2021  Doing well - improving mobility - residual stiffness into extension -- benefitting from more consistent stretching at home - throughout the day -  Improved functional ability -     Progress per Plan of Care - add TREADMILL        _________________________________________________    Manual Therapy:            08     mins  88133;  Therapeutic Exercise:    30    mins  78124;     Neuromuscular Mele:        mins  40415;    Therapeutic Activity:     15      mins  15158;     Ultrasound:                          mins  54288;  Iontophoresis:                     mins  35290;    Electrical Stimulation:         mins  71748 ( );  Mechanical Traction:          mins  65476  Dry Needling                       mins self-pay     Timed Treatment:   53    mins                  Total Treatment:     65    mins    Shen Hope PT  Physical Therapist

## 2021-12-14 ENCOUNTER — TREATMENT (OUTPATIENT)
Dept: PHYSICAL THERAPY | Facility: CLINIC | Age: 81
End: 2021-12-14

## 2021-12-14 DIAGNOSIS — Z96.651 S/P TKR (TOTAL KNEE REPLACEMENT), RIGHT: Primary | ICD-10-CM

## 2021-12-14 DIAGNOSIS — Z74.09 IMPAIRED FUNCTIONAL MOBILITY, BALANCE, GAIT, AND ENDURANCE: ICD-10-CM

## 2021-12-14 DIAGNOSIS — M25.661 KNEE STIFFNESS, RIGHT: ICD-10-CM

## 2021-12-14 DIAGNOSIS — R26.9 ABNORMAL GAIT: ICD-10-CM

## 2021-12-14 PROCEDURE — 97110 THERAPEUTIC EXERCISES: CPT | Performed by: PHYSICAL THERAPIST

## 2021-12-14 PROCEDURE — 97530 THERAPEUTIC ACTIVITIES: CPT | Performed by: PHYSICAL THERAPIST

## 2021-12-14 PROCEDURE — 97140 MANUAL THERAPY 1/> REGIONS: CPT | Performed by: PHYSICAL THERAPIST

## 2021-12-14 NOTE — PROGRESS NOTES
Physical Therapy Daily Progress Note    Patient Name: Steve Mace         :  1940  Referring Physician: Juice Sal APRN      Subjective   Steve Mace reports: stiff this AM - admits to anterior knee pain w/ extension stretching and walking w/ knee ext -     Objective   Ambulating w/ flexed knee (R) - pain anterior knee (PF jt) w/ knee e    See Exercise, Manual, and Modality Logs for complete treatment. ADDED MHP post knee w/ prone hang w/ 3# Wt on ankle at start      Functional / Therapeutic Activities:  15 min  · TAPING / BRACING: FK-tape to Unload PF jt x 2 layers to decrease knee pain and allow improved knee extension/gait/function -   · SEE EXERCISE FLOW SHEET -   · Jt protection, ADL modification; Posture and       Assessment/Plan  80 yo male S/P (R) TKA 10/22/2021  Doing well - improving mobility - residual stiffness into extension  Which apparently may be caused by PF compression w/ full knee extension - Pt noted improved stretch in posterior knee and alleviation of anterior knee pain w/ taping -   Improved functional ability overall -      Progress per Plan of Care - add TREADMILL      _________________________________________________     Manual Therapy:            08     mins  69172;  Therapeutic Exercise:    30    mins  78334;     Neuromuscular Mele:        mins  19030;    Therapeutic Activity:     15      mins  14126;     Ultrasound:                          mins  56255;  Iontophoresis:                     mins  74956;    Electrical Stimulation:         mins  64707 ( );  Mechanical Traction:          mins  12965  MHP                15      mins w/ prone knee ext stretch at start (NC)     Timed Treatment:   53    mins                  Total Treatment:     65    mi    Shen Hope, PT  Physical Therapist

## 2021-12-17 ENCOUNTER — TREATMENT (OUTPATIENT)
Dept: PHYSICAL THERAPY | Facility: CLINIC | Age: 81
End: 2021-12-17

## 2021-12-17 DIAGNOSIS — Z74.09 IMPAIRED FUNCTIONAL MOBILITY, BALANCE, GAIT, AND ENDURANCE: ICD-10-CM

## 2021-12-17 DIAGNOSIS — Z96.651 S/P TKR (TOTAL KNEE REPLACEMENT), RIGHT: Primary | ICD-10-CM

## 2021-12-17 DIAGNOSIS — R26.9 ABNORMAL GAIT: ICD-10-CM

## 2021-12-17 DIAGNOSIS — M25.661 KNEE STIFFNESS, RIGHT: ICD-10-CM

## 2021-12-17 PROCEDURE — 97530 THERAPEUTIC ACTIVITIES: CPT | Performed by: PHYSICAL THERAPIST

## 2021-12-17 PROCEDURE — 97140 MANUAL THERAPY 1/> REGIONS: CPT | Performed by: PHYSICAL THERAPIST

## 2021-12-17 PROCEDURE — 97110 THERAPEUTIC EXERCISES: CPT | Performed by: PHYSICAL THERAPIST

## 2021-12-17 NOTE — PROGRESS NOTES
Physical Therapy Daily Progress Note  RE-ASSESSMENT     Patient Name: Steve Mace         :  1940  Referring Physician: Juice Sal APRN      Subjective   Steve Mace reports: tape helpful in reducing anterior knee pain / catching - Notes soreness after doing yard work w/ grandson for a couple of hours - persistent swelling, especially a pocket of swelling sup/lat knee -   To be out of town for a few weeks over [place] - F/U w/ surgeon 2021.     Objective   Pt ambulating w/ o AD - mildly antalgic gait - diffuse swelling (R) knee w/ pocket of swelling ant/sup knee (R) -   Less tight / tender posterior knee (R)   Able to achieve near full extension after MT / stretching - Flexion >115-120-deg w/ stretching -   See Exercise, Manual, and Modality Logs for complete treatment.     Functional / Therapeutic Activities:  20 min  · TAPING / BRACING: K-tape to Unload PF Jt x 2 layers to reduce pain and allow increased knee extension and gait / function -   · SEE EXERCISE FLOW SHEET -   · FUNCTIONAL ASSESSMENT -   · Jt protection, ADL modification; Posture and      Assessment/Plan  82 yo male S/P (R) TKA 10/22/2021  Doing well - improving mobility - residual stiffness into extension  Which apparently may be caused by PF compression w/ full knee extension - Pt noted improved stretch in posterior knee and alleviation of anterior knee pain w/ taping -   Improved functional ability overall -   Pt may benefit from regular anti-inflammatory to address persistent swelling -   GOAL STATUS:   STG: All met  LTG: Progressing toward ROM, strength, gait, function goals -   Steve would benefit from continued Physical Therapy -    Progress per Plan of Care once Pt returns from [place] - F/U w/ Surgeon 2021.         _________________________________________________    Manual Therapy:            08     mins  75817;  Therapeutic Exercise:    28    mins  45179;     Neuromuscular Mele:        mins  95733;     Therapeutic Activity:     20      mins  35697;     Ultrasound:                          mins  78945;  Iontophoresis:                     mins  36127;    Electrical Stimulation:         mins  98530 ( );  Mechanical Traction:          mins  82943  Dry Needling                       mins self-pay     Timed Treatment:   56    mins                  Total Treatment:     70    mins    Shen Hope PT  Physical Therapist

## 2021-12-22 ENCOUNTER — READMISSION MANAGEMENT (OUTPATIENT)
Dept: CALL CENTER | Facility: HOSPITAL | Age: 81
End: 2021-12-22

## 2021-12-23 ENCOUNTER — OFFICE VISIT (OUTPATIENT)
Dept: ORTHOPEDIC SURGERY | Facility: CLINIC | Age: 81
End: 2021-12-23

## 2021-12-23 VITALS
WEIGHT: 213.2 LBS | BODY MASS INDEX: 30.52 KG/M2 | HEIGHT: 70 IN | RESPIRATION RATE: 20 BRPM | OXYGEN SATURATION: 97 % | TEMPERATURE: 98 F

## 2021-12-23 DIAGNOSIS — Z96.651 S/P TKR (TOTAL KNEE REPLACEMENT), RIGHT: Primary | ICD-10-CM

## 2021-12-23 PROCEDURE — 73562 X-RAY EXAM OF KNEE 3: CPT | Performed by: NURSE PRACTITIONER

## 2021-12-23 PROCEDURE — 99024 POSTOP FOLLOW-UP VISIT: CPT | Performed by: NURSE PRACTITIONER

## 2021-12-23 RX ORDER — HYDROCODONE BITARTRATE AND ACETAMINOPHEN 7.5; 325 MG/1; MG/1
TABLET ORAL
Qty: 10 TABLET | Refills: 0 | Status: SHIPPED | OUTPATIENT
Start: 2021-12-23 | End: 2022-05-17

## 2021-12-23 RX ORDER — ASPIRIN 81 MG/1
TABLET ORAL
Qty: 60 TABLET | Refills: 0 | OUTPATIENT
Start: 2021-12-23

## 2021-12-23 NOTE — TELEPHONE ENCOUNTER
Patient only needs 6 weeks of DVT prophylaxis therefore this prescription is not necessary.  Should he require to being on aspirin from another physician that he needs to seek refills from them.  As far as we are concerned he is done with his treatment regimen of aspirin.

## 2021-12-23 NOTE — PROGRESS NOTES
Steve Mace : 1940 MRN: [id number] DATE: 2021    DIAGNOSIS: 8 week follow up right total knee      SUBJECTIVE:Patient returns today for 8 week follow up of right total knee replacement. Patient reports doing well with no unusual complaints. Appears to be progressing appropriately. Reports pain worse at night, having difficulty sleeping. Just finished PT.     OBJECTIVE:   Exam:. The incision is well healed. No sign of infection. Range of motion is measured at 5  to 120. The calf is soft and nontender with a negative Homans sign. Strength is progressing and the patient is ambulating appropriately.    DIAGNOSTIC STUDIES  Xrays: 3 views of the right knee (AP, lateral, and sunrise) were ordered and reviewed for evaluation of recent knee replacement. They demonstrate a well positioned, well aligned knee replacement without complicating factors noted. In comparison with previous films there has been no change.    ASSESSMENT: 8 week status post right knee replacement.    PLAN: 1) Continue with PT exercises as prescribed   2) Follow up in 10 months for annual visit   3) patient asked for a few more pain pills to help continue with physical therapy to work on hamstring stretches.  Patient reports his pain at night is pretty severe.  I educated the patient that we did need to get him off of pain medicine as continued use could lead to dependency.  I will give him 10 Norco tablets and he will transition to Tylenol over-the-counter.    Juice Sal, APRN  2021      [employer] Notice:    Much of this encounter note is an electronic transcription/translation of spoken language to printed text. The electronic translation of spoken language may permit erroneous, or at times, nonsensical words or phrases to be inadvertently transcribed; Although I have reviewed the note for such errors, some may still exist

## 2022-01-11 ENCOUNTER — TREATMENT (OUTPATIENT)
Dept: PHYSICAL THERAPY | Facility: CLINIC | Age: 82
End: 2022-01-11

## 2022-01-11 DIAGNOSIS — Z96.651 S/P TKR (TOTAL KNEE REPLACEMENT), RIGHT: Primary | ICD-10-CM

## 2022-01-11 DIAGNOSIS — Z74.09 IMPAIRED FUNCTIONAL MOBILITY, BALANCE, GAIT, AND ENDURANCE: ICD-10-CM

## 2022-01-11 DIAGNOSIS — M25.661 KNEE STIFFNESS, RIGHT: ICD-10-CM

## 2022-01-11 PROCEDURE — 97110 THERAPEUTIC EXERCISES: CPT | Performed by: PHYSICAL THERAPIST

## 2022-01-11 PROCEDURE — 97530 THERAPEUTIC ACTIVITIES: CPT | Performed by: PHYSICAL THERAPIST

## 2022-01-11 PROCEDURE — 97112 NEUROMUSCULAR REEDUCATION: CPT | Performed by: PHYSICAL THERAPIST

## 2022-01-11 NOTE — PROGRESS NOTES
Physical Therapy Daily Progress Note    Patient Name: Steve Mace         :  1940  Referring Physician: Juice Sal APRN      Subjective   Steve Mace reports: no new problems - some residual swelling - travelled to [place] w/o problems - Notes no functional limitations -     Objective   Ambulating w/ near normal gait - slight flexed knee posture (R) -   AROM 120-deg Flexion; 5-deg Ext (R) knee  Strength: WNL - Pt able to ambulate up /down flight of steps repeatedly 4x w/ reciprocal gait w/o being antalgic -     See Exercise, Manual, and Modality Logs for complete treatment.     Functional / Therapeutic Activities:  23 min  · TAPING / BRACING: NA  · SEE EXERCISE FLOW SHEET -   · FUNCTIONAL ASSESSMENT -   · Jt protection, ADL modification; Posture and      Assessment/Plan  80 yo male S/P (R) TKA 10/22/2021  Doing well - Improved  mobility gait, function - no functional limitations noted -   Goals met -   Steve would do well with continuing w/ a HEP and discontinuing formal PT at this time -     DC PT TO HEP -         _________________________________________________    Manual Therapy:                 mins  87832;  Therapeutic Exercise:    25    mins  67466;     Neuromuscular Mele:   08     mins  50627;    Therapeutic Activity:     25      mins  10630;     Ultrasound:                          mins  40222;  Iontophoresis:                     mins  75872;    Electrical Stimulation:         mins  70033 ( );  Mechanical Traction:          mins  38201  Dry Needling                       mins self-pay     Timed Treatment:   58    mins                  Total Treatment:     70    mins    Shen Hope, PT  Physical Therapist

## 2022-01-24 ENCOUNTER — READMISSION MANAGEMENT (OUTPATIENT)
Dept: CALL CENTER | Facility: HOSPITAL | Age: 82
End: 2022-01-24

## 2022-01-24 NOTE — OUTREACH NOTE
Total Joint Month 3 Survey      Responses   [facility] patient discharged from? [place]   Does the patient have one of the following disease processes/diagnoses(primary or secondary)? Total Joint Replacement   Joint surgery performed? Knee   Month 3 attempt successful? Yes   Call start time 1029   Call end time 1037   Has the patient been back in either the hospital or Emergency Department since discharge? No   Discharge diagnosis R. TKR   Is patient permission given to speak with other caregiver? Yes   List who call center can speak with wife   Is the patient still attending therapy sessions(either in the home or as an outpatient)? No   Has the patient fallen since discharge? No   Comments c/o pain at night in bed, he can sleep in recliner. We discussed sleeping positions, he will try elevating HOB or pillows between knees for a more comfortable position in bed. He reports no other issues, typically walking independently 2miles daily.   What is the patient's perception of their functional status since discharge? Improving   Graduated Yes   Is the patient interested in additional calls from an ambulatory ?  NOTE:  applies to high risk patients requiring additional follow-up. No   Did the patient feel the follow up calls were helpful during their recovery period? Yes   Was the number of calls appropriate? Yes          Yaneth Maria RN

## 2022-01-31 ENCOUNTER — TRANSCRIBE ORDERS (OUTPATIENT)
Dept: ADMINISTRATIVE | Facility: HOSPITAL | Age: 82
End: 2022-01-31

## 2022-01-31 DIAGNOSIS — R91.8 PULMONARY INFILTRATE: Primary | ICD-10-CM

## 2022-02-01 ENCOUNTER — TELEPHONE (OUTPATIENT)
Dept: ORTHOPEDIC SURGERY | Facility: CLINIC | Age: 82
End: 2022-02-01

## 2022-02-01 NOTE — TELEPHONE ENCOUNTER
From: Jayda Martinez On: 11/07/2021 11:41 PM   To: Iban Corrales MD, Uma Gant, RegSherry Rep   Priority: Routine   Routing Comments:   Patient discharged from home health agency on 11/5/21 due to no longer homebound and plans to transition to [facility] outpatient next week.

## 2022-05-02 ENCOUNTER — HOSPITAL ENCOUNTER (OUTPATIENT)
Dept: CT IMAGING | Facility: HOSPITAL | Age: 82
Discharge: HOME | End: 2022-05-02
Admitting: INTERNAL MEDICINE

## 2022-05-02 DIAGNOSIS — R91.8 PULMONARY INFILTRATE: ICD-10-CM

## 2022-05-02 PROCEDURE — 71250 CT THORAX DX C-: CPT

## 2022-05-17 ENCOUNTER — OFFICE VISIT (OUTPATIENT)
Dept: CARDIOLOGY | Facility: CLINIC | Age: 82
End: 2022-05-17

## 2022-05-17 ENCOUNTER — TELEPHONE (OUTPATIENT)
Dept: CARDIOLOGY | Facility: CLINIC | Age: 82
End: 2022-05-17

## 2022-05-17 ENCOUNTER — TRANSCRIBE ORDERS (OUTPATIENT)
Dept: ADMINISTRATIVE | Facility: HOSPITAL | Age: 82
End: 2022-05-17

## 2022-05-17 VITALS
WEIGHT: 224 LBS | HEART RATE: 64 BPM | SYSTOLIC BLOOD PRESSURE: 128 MMHG | DIASTOLIC BLOOD PRESSURE: 72 MMHG | BODY MASS INDEX: 32.07 KG/M2 | HEIGHT: 70 IN

## 2022-05-17 DIAGNOSIS — I25.810 CORONARY ARTERY DISEASE INVOLVING CORONARY BYPASS GRAFT OF NATIVE HEART WITHOUT ANGINA PECTORIS: Primary | ICD-10-CM

## 2022-05-17 DIAGNOSIS — R06.02 SHORTNESS OF BREATH: ICD-10-CM

## 2022-05-17 DIAGNOSIS — Z13.6 ENCOUNTER FOR SCREENING FOR VASCULAR DISEASE: Primary | ICD-10-CM

## 2022-05-17 PROCEDURE — 93000 ELECTROCARDIOGRAM COMPLETE: CPT | Performed by: INTERNAL MEDICINE

## 2022-05-17 PROCEDURE — 99214 OFFICE O/P EST MOD 30 MIN: CPT | Performed by: INTERNAL MEDICINE

## 2022-05-17 RX ORDER — CHLORAL HYDRATE 500 MG
CAPSULE ORAL
COMMUNITY

## 2022-05-17 RX ORDER — DIPHENOXYLATE HYDROCHLORIDE AND ATROPINE SULFATE 2.5; .025 MG/1; MG/1
TABLET ORAL DAILY
COMMUNITY

## 2022-05-20 NOTE — TELEPHONE ENCOUNTER
Please let patient know that it seems that he moved a bit on the study so image quality is limited but overall appears to be stable measuring 3.9 in the ascending aorta as was noted intermittently in the past

## 2022-05-23 NOTE — TELEPHONE ENCOUNTER
Left voicemail for Steve Mace requesting callback.    Thank you,  Haven Gomez RN  Triage Nurse [facility] [place]

## 2022-05-23 NOTE — TELEPHONE ENCOUNTER
Notified pt of results. He verbalized understanding.    Thank you,    Mary Kay Ozuna, RN  Triage [facility] [place]

## 2022-06-01 ENCOUNTER — HOSPITAL ENCOUNTER (OUTPATIENT)
Dept: CARDIOLOGY | Facility: HOSPITAL | Age: 82
Discharge: HOME | End: 2022-06-01
Admitting: INTERNAL MEDICINE

## 2022-06-01 VITALS — WEIGHT: 224.87 LBS | BODY MASS INDEX: 32.19 KG/M2 | HEIGHT: 70 IN

## 2022-06-01 DIAGNOSIS — R06.02 SHORTNESS OF BREATH: ICD-10-CM

## 2022-06-01 DIAGNOSIS — I25.810 CORONARY ARTERY DISEASE INVOLVING CORONARY BYPASS GRAFT OF NATIVE HEART WITHOUT ANGINA PECTORIS: ICD-10-CM

## 2022-06-01 LAB
BH CV NUCLEAR PRIOR STUDY: 2
BH CV REST NUCLEAR ISOTOPE DOSE: 10.8 MCI
BH CV STRESS BP STAGE 1: NORMAL
BH CV STRESS COMMENTS STAGE 1: NORMAL
BH CV STRESS DOSE REGADENOSON STAGE 1: 0.4
BH CV STRESS DURATION MIN STAGE 1: 0
BH CV STRESS DURATION SEC STAGE 1: 10
BH CV STRESS HR STAGE 1: 83
BH CV STRESS NUCLEAR ISOTOPE DOSE: 35.6 MCI
BH CV STRESS PROTOCOL 1: NORMAL
BH CV STRESS RECOVERY BP: NORMAL MMHG
BH CV STRESS RECOVERY HR: 66 BPM
BH CV STRESS STAGE 1: 1
LV EF NUC BP: 57 %
MAXIMAL PREDICTED HEART RATE: 140 BPM
PERCENT MAX PREDICTED HR: 59.29 %
STRESS BASELINE BP: NORMAL MMHG
STRESS BASELINE HR: 68 BPM
STRESS PERCENT HR: 70 %
STRESS POST EXERCISE DUR SEC: 10 SEC
STRESS POST PEAK BP: NORMAL MMHG
STRESS POST PEAK HR: 83 BPM
STRESS TARGET HR: 119 BPM

## 2022-06-01 PROCEDURE — 25010000002 REGADENOSON 0.4 MG/5ML SOLUTION: Performed by: INTERNAL MEDICINE

## 2022-06-01 PROCEDURE — 93017 CV STRESS TEST TRACING ONLY: CPT

## 2022-06-01 PROCEDURE — 93018 CV STRESS TEST I&R ONLY: CPT | Performed by: INTERNAL MEDICINE

## 2022-06-01 PROCEDURE — 78452 HT MUSCLE IMAGE SPECT MULT: CPT | Performed by: INTERNAL MEDICINE

## 2022-06-01 PROCEDURE — A9502 TC99M TETROFOSMIN: HCPCS | Performed by: INTERNAL MEDICINE

## 2022-06-01 PROCEDURE — 78452 HT MUSCLE IMAGE SPECT MULT: CPT

## 2022-06-01 PROCEDURE — 0 TECHNETIUM TETROFOSMIN KIT: Performed by: INTERNAL MEDICINE

## 2022-06-01 PROCEDURE — 93016 CV STRESS TEST SUPVJ ONLY: CPT | Performed by: INTERNAL MEDICINE

## 2022-06-01 RX ADMIN — TETROFOSMIN 1 DOSE: 1.38 INJECTION, POWDER, LYOPHILIZED, FOR SOLUTION INTRAVENOUS at 08:08

## 2022-06-01 RX ADMIN — TETROFOSMIN 1 DOSE: 1.38 INJECTION, POWDER, LYOPHILIZED, FOR SOLUTION INTRAVENOUS at 07:15

## 2022-06-01 RX ADMIN — REGADENOSON 0.4 MG: 0.08 INJECTION, SOLUTION INTRAVENOUS at 08:08

## 2022-06-02 ENCOUNTER — TELEPHONE (OUTPATIENT)
Dept: CARDIOLOGY | Facility: CLINIC | Age: 82
End: 2022-06-02

## 2022-06-02 NOTE — TELEPHONE ENCOUNTER
Can you let the patient know that his stress test did not show any evidence of significant heart blockages and was considered low risk.  He should keep his follow-up in November as planned.

## 2022-06-02 NOTE — TELEPHONE ENCOUNTER
Reviewed results and recommendations with Steve Mace.  Patient verbalized understanding of results and recommendations.    Patient stated [facility] but was unable to do so via [employer].  Provided patient with fax number ([phone] and [phone]).    Thank you,  Haven Gomez RN  Triage Nurse [facility] [place]

## 2022-06-24 ENCOUNTER — HOSPITAL ENCOUNTER (OUTPATIENT)
Dept: CARDIOLOGY | Facility: HOSPITAL | Age: 82
Discharge: HOME | End: 2022-06-24
Admitting: INTERNAL MEDICINE

## 2022-06-24 VITALS
HEART RATE: 62 BPM | DIASTOLIC BLOOD PRESSURE: 78 MMHG | SYSTOLIC BLOOD PRESSURE: 144 MMHG | WEIGHT: 218 LBS | BODY MASS INDEX: 32.29 KG/M2 | HEIGHT: 69 IN

## 2022-06-24 DIAGNOSIS — Z13.6 ENCOUNTER FOR SCREENING FOR VASCULAR DISEASE: ICD-10-CM

## 2022-06-24 LAB
BH CV ECHO MEAS - DIST AO DIAM: 1.37 CM
BH CV VAS BP LEFT ARM: NORMAL MMHG
BH CV VAS BP RIGHT ARM: NORMAL MMHG
BH CV XLRA MEAS - MID AO DIAM: 1.7 CM
BH CV XLRA MEAS - PAD LEFT ABI DP: 1.17
BH CV XLRA MEAS - PAD LEFT ABI PT: 1.22
BH CV XLRA MEAS - PAD LEFT ARM: 144 MMHG
BH CV XLRA MEAS - PAD LEFT LEG DP: 168 MMHG
BH CV XLRA MEAS - PAD LEFT LEG PT: 176 MMHG
BH CV XLRA MEAS - PAD RIGHT ABI DP: 1.15
BH CV XLRA MEAS - PAD RIGHT ABI PT: 1.28
BH CV XLRA MEAS - PAD RIGHT ARM: 136 MMHG
BH CV XLRA MEAS - PAD RIGHT LEG DP: 165 MMHG
BH CV XLRA MEAS - PAD RIGHT LEG PT: 185 MMHG
BH CV XLRA MEAS - PROX AO DIAM: 2.16 CM
BH CV XLRA MEAS LEFT ICA/CCA RATIO: 1.08
BH CV XLRA MEAS LEFT MID CCA PSV: NORMAL CM/SEC
BH CV XLRA MEAS LEFT MID ICA PSV: NORMAL CM/SEC
BH CV XLRA MEAS LEFT PROX ECA PSV: NORMAL CM/SEC
BH CV XLRA MEAS RIGHT ICA/CCA RATIO: 1.18
BH CV XLRA MEAS RIGHT MID CCA PSV: NORMAL CM/SEC
BH CV XLRA MEAS RIGHT MID ICA PSV: NORMAL CM/SEC
BH CV XLRA MEAS RIGHT PROX ECA PSV: NORMAL CM/SEC
MAXIMAL PREDICTED HEART RATE: 140 BPM
STRESS TARGET HR: 119 BPM

## 2022-06-24 PROCEDURE — 93799 UNLISTED CV SVC/PROCEDURE: CPT

## 2022-10-25 ENCOUNTER — OFFICE VISIT (OUTPATIENT)
Dept: ORTHOPEDIC SURGERY | Facility: CLINIC | Age: 82
End: 2022-10-25

## 2022-10-25 VITALS — TEMPERATURE: 97.8 F | WEIGHT: 229.1 LBS | HEIGHT: 70 IN | BODY MASS INDEX: 32.8 KG/M2

## 2022-10-25 DIAGNOSIS — R52 PAIN: Primary | ICD-10-CM

## 2022-10-25 PROCEDURE — 99212 OFFICE O/P EST SF 10 MIN: CPT | Performed by: ORTHOPAEDIC SURGERY

## 2022-10-25 PROCEDURE — 73562 X-RAY EXAM OF KNEE 3: CPT | Performed by: ORTHOPAEDIC SURGERY

## 2022-10-25 NOTE — PROGRESS NOTES
"Steve Mace : 1940 MRN: [id number] DATE: 10/25/2022    DIAGNOSIS: Annual follow up right total knee      SUBJECTIVE:Patient returns today for  1 year follow up of right total knee replacement. Patient reports doing well with no unusual complaints. Denies any limitations due to the knee.    OBJECTIVE:    Temp 97.8 °F (36.6 °C)   Ht 177.8 cm (70\")   Wt 104 kg (229 lb 1.6 oz)   BMI 32.87 kg/m²   Family History   Problem Relation Age of Onset   • Heart attack Father    • Diabetes Father    • Heart disease Brother    • Heart attack Brother    • Diabetes Other    • Diabetes Maternal Grandfather    • Malig Hyperthermia Neg Hx      Past Medical History:   Diagnosis Date   • Acute myopericarditis    • Arthritis     OSTEOARTHRITIS   • Chronic renal insufficiency    • COPD (chronic obstructive pulmonary disease) ([facility])    • Coronary artery disease    • Diabetes ([facility])    • Hyperlipidemia    • Hypertension    • Knee pain, right    • Knee swelling 2019    HAVE HAD 3 CORTISONE SHOTS   • Lumbosacral disc disease 'S   • Mitral valve prolapse    • Prostate cancer (HCC)    • Sleep apnea     CPAP-SOME USE, PROVIDED EDUCATION ON SAFE SURGERY SALE SLEEP   • Thoracic aortic aneurysm without rupture      Past Surgical History:   Procedure Laterality Date   • COLONOSCOPY     • PROSTATECTOMY     • TOTAL KNEE ARTHROPLASTY Right 10/22/2021    Procedure: TOTAL KNEE ARTHROPLASTY;  Surgeon: Iban Corrales MD;  Location: [facility];  Service: Orthopedics;  Laterality: Right;     Social History     Socioeconomic History   • Marital status:    Tobacco Use   • Smoking status: Former     Packs/day: 1.00     Years: 20.00     Pack years: 20.00     Types: Cigarettes     Quit date: 10/18/1996     Years since quittin.0   • Smokeless tobacco: Never   Vaping Use   • Vaping Use: Never used   Substance and Sexual Activity   • Alcohol use: Yes     Alcohol/week: 0.0 standard drinks     Comment: FEW DRINKS A NIGHT   • Drug use: No "   • Sexual activity: Yes     Partners: Female     Review of Systems: 14 point review of systems performed, all systems negative     Exam:. The incision is well healed. Range of motion is measured at 0 to 120. The calf is soft and nontender with a negative Homans sign. Alignment is neutral. Good quad strength. There is no evidence of varus/valgus or flexion instability. No effusion. Intact to light touch with palpable distal pulses.     DIAGNOSTIC STUDIES  Xrays: 3 views(AP bilateral knees, lateral right, and sunrise bilateral knees) were ordered and reviewed for evaluation of right knee replacement. They demonstrate a well positioned, well aligned knee replacement without complicating factors noted. In comparison with previous films there has been no change.    ASSESSMENT: Annual follow up right knee replacement.    PLAN:  Continue activities as tolerated    Follow up KLARISSA Corrales MD  10/25/2022

## 2022-11-16 ENCOUNTER — OFFICE VISIT (OUTPATIENT)
Dept: CARDIOLOGY | Facility: CLINIC | Age: 82
End: 2022-11-16

## 2022-11-16 VITALS
HEART RATE: 61 BPM | BODY MASS INDEX: 33.07 KG/M2 | SYSTOLIC BLOOD PRESSURE: 140 MMHG | HEIGHT: 70 IN | DIASTOLIC BLOOD PRESSURE: 86 MMHG | WEIGHT: 231 LBS

## 2022-11-16 DIAGNOSIS — I25.10 CORONARY ARTERY DISEASE INVOLVING NATIVE CORONARY ARTERY OF NATIVE HEART WITHOUT ANGINA PECTORIS: Primary | ICD-10-CM

## 2022-11-16 DIAGNOSIS — I10 PRIMARY HYPERTENSION: ICD-10-CM

## 2022-11-16 DIAGNOSIS — I71.20 THORACIC AORTIC ANEURYSM WITHOUT RUPTURE, UNSPECIFIED PART: ICD-10-CM

## 2022-11-16 PROCEDURE — 99214 OFFICE O/P EST MOD 30 MIN: CPT | Performed by: NURSE PRACTITIONER

## 2022-11-16 PROCEDURE — 93000 ELECTROCARDIOGRAM COMPLETE: CPT | Performed by: NURSE PRACTITIONER

## 2022-11-16 NOTE — PROGRESS NOTES
Date of Office Visit: 2022  Encounter Provider: Coral Gonzales, LAURO, APRN  Place of Service: [facility] CARDIOLOGY  Patient Name: Steve Mace  :1940        Subjective:     Chief Complaint:  Coronary artery disease, hypertension      History of Present Illness:  Steve Mace is a 82 y.o. male patient of Dr. Hughes.  I am seeing this patient in the office today and I have reviewed his records.    Patient has a history of thoracic aortic aneurysm, coronary artery disease, hypertension, renal insufficiency, diabetes, mitral valve prolapse, dyslipidemia, sleep apnea, pulmonary nodule.     In  patient was seen for atypical chest discomfort.  CT scan of the chest showed large pericardial effusion which was treated with nonsteroidals with resolution of symptoms and effusion.  Echo showed left ventricular dilation with EF of 45 to 50%, no significant valvular disease, aortic root 4.3 cm.  PET stress test was negative for ischemia.  By  EF had normalized to 50%.  He was noted to have mitral valve prolapse without regurgitation.  He was noted to have multivessel coronary disease by CT angiography.  May 2017 he had echo showing normal EF of 60%, aortic root of 4.0, ascending aorta of 3.7 cm, aortic valve is trileaflet and functioning normally.  Stress perfusion study was negative for ischemia.  RV appeared large on stress test however was not noted to be large on echo.  CTA of the chest 3/2019 showed mildly dilated ascending aorta unchanged measuring 3.9 cm with mild coronary artery calcifications.  CT 2019 did not mention aortic size.  CT scan 2020 showed stable mild ectasia of thoracic aorta.  Follow-up echo 2021 showed normal LV systolic function with EF of 66%, mild thickening of the aortic valve, trace tricuspid regurgitation with normal RVSP, mild dilation of the ascending aorta at 4.0 cm.    Patient had CT chest through pulmonology 2022.  Ascending aorta  measuring 3.9 cm.  Dr. Hughes felt that this was stable.  Stress perfusion study 6/2022 was without evidence of ischemia, considered low risk.  Vascular screening bundle was normal.        Patient presents to office today for follow-up appointment.  Patient reports he is doing well since last visit.  He stays active golfing and gets a lot of walking and with this.  He denies any exertional symptoms or concerns.  Denies chest pain or discomfort, dyspnea, palpitations, edema, dizziness, syncope, near syncope, falls, fatigue, or abnormal bleeding.  He reports blood pressure is always well controlled at doctors office visits so has not been monitoring at home recently.  [facility] and his 24-year-old grandson is coming in with his fiancée from [place] [employer] [place] getting his masters in public health/statistics.            Past Medical History:   Diagnosis Date   • Acute myopericarditis    • Arthritis     OSTEOARTHRITIS   • Chronic renal insufficiency    • COPD (chronic obstructive pulmonary disease) ([facility])    • Coronary artery disease    • Diabetes ([facility])    • Hyperlipidemia    • Hypertension    • Knee pain, right    • Knee swelling 2019    HAVE HAD 3 CORTISONE SHOTS   • Lumbosacral disc disease 1970'S   • Mitral valve prolapse    • Prostate cancer (HCC)    • Sleep apnea     CPAP-SOME USE, PROVIDED EDUCATION ON SAFE SURGERY SALE SLEEP   • Thoracic aortic aneurysm without rupture      Past Surgical History:   Procedure Laterality Date   • COLONOSCOPY     • PROSTATECTOMY     • TOTAL KNEE ARTHROPLASTY Right 10/22/2021    Procedure: TOTAL KNEE ARTHROPLASTY;  Surgeon: Iban Corrales MD;  Location: [facility];  Service: Orthopedics;  Laterality: Right;     Outpatient Medications Prior to Visit   Medication Sig Dispense Refill   • aspirin 81 MG EC tablet Take 1 tab po bid x 14 days; then take 1 tab po daily x 28 days 60 tablet 0   • atorvastatin (LIPITOR) 40 MG  tablet Take 40 mg by mouth Daily.     • Coenzyme Q10 (COQ-10 PO) Take  by mouth.     • losartan (COZAAR) 50 MG tablet Take 50 mg by mouth Daily.     • metFORMIN (GLUCOPHAGE) 1000 MG tablet Take 1,000 mg by mouth 2 (Two) Times a Day With Meals.     • metoprolol tartrate (LOPRESSOR) 50 MG tablet Take 25 mg by mouth Daily.     • multivitamin (THERAGRAN) tablet tablet Take  by mouth Daily.     • Omega-3 Fatty Acids (fish oil) 1000 MG capsule capsule Take  by mouth Daily With Breakfast.     • SYMBICORT 160-4.5 MCG/ACT inhaler Inhale 2 puffs 2 (Two) Times a Day. 2 PUFFS IN THE AM 1 PUFF AT BEDTIME       Facility-Administered Medications Prior to Visit   Medication Dose Route Frequency Provider Last Rate Last Admin   • Chlorhexidine Gluconate Cloth 2 % pads   Apply externally BID Mindy Bustos APRN       • Chlorhexidine Gluconate Cloth 2 % pads   Apply externally Take As Directed Iban Corrales MD           Allergies as of 2022   • (No Known Allergies)     Social History     Socioeconomic History   • Marital status:    Tobacco Use   • Smoking status: Former     Packs/day: 1.00     Years: 20.00     Pack years: 20.00     Types: Cigarettes     Quit date: 10/18/1996     Years since quittin.0   • Smokeless tobacco: Never   Vaping Use   • Vaping Use: Never used   Substance and Sexual Activity   • Alcohol use: Yes     Comment: FEW DRINKS A NIGHT  caffeine- coffee daily   • Drug use: No   • Sexual activity: Yes     Partners: Female     Family History   Problem Relation Age of Onset   • Heart attack Father    • Diabetes Father    • Heart disease Brother    • Heart attack Brother    • Diabetes Other    • Diabetes Maternal Grandfather    • Malig Hyperthermia Neg Hx          Review of Systems   Constitutional: Negative for malaise/fatigue.   Cardiovascular:        SEE HPI   Respiratory: Negative for shortness of breath.    Hematologic/Lymphatic: Negative for bleeding problem.   Musculoskeletal: Negative for  "falls.   Gastrointestinal: Negative for melena.   Genitourinary: Negative for hematuria.   Neurological: Negative for dizziness.   Psychiatric/Behavioral: Negative for altered mental status.             Objective:     Vitals:    11/16/22 0846   BP: 140/86   Pulse: 61   Weight: 105 kg (231 lb)   Height: 177.8 cm (70\")     Body mass index is 33.15 kg/m².      PHYSICAL EXAM:  Constitutional:       General: Not in acute distress.     Appearance: Well-developed. Not diaphoretic.   Eyes:      Pupils: Pupils are equal, round, and reactive to light.   HENT:      Head: Normocephalic and atraumatic.   Pulmonary:      Effort: Pulmonary effort is normal. No respiratory distress.      Breath sounds: Normal breath sounds. No wheezing. No rales.   Cardiovascular:      Normal rate. Regular rhythm.      Murmurs: There is no murmur.      No gallop. No click. No rub.   Edema:     Peripheral edema absent.   Abdominal:      General: Bowel sounds are normal.      Palpations: Abdomen is soft.   Musculoskeletal: Normal range of motion.         General: No tenderness or deformity. Skin:     General: Skin is warm and dry.      Findings: No erythema.   Neurological:      Mental Status: Alert and oriented to person, place, and time.   Psychiatric:         Behavior: Behavior normal.         Judgment: Judgment normal.             ECG 12 Lead    Date/Time: 11/16/2022 9:46 AM  Performed by: Coral Gonzales DNP, APRN  Authorized by: Coral Gonzales DNP, APRN   Comparison: compared with previous ECG from 5/17/2022  Rhythm: sinus rhythm  BPM: 61  Comments: No significant changes from previous EKGs              Assessment:       Diagnosis Plan   1. Coronary artery disease involving native coronary artery of native heart without angina pectoris        2. Thoracic aortic aneurysm without rupture, unspecified part        3. Primary hypertension              Plan:     1. Thoracic aortic aneurysm: Stable since at least 2015.  Was felt to be stable on " 5/2022 CT chest as well.  2. Coronary artery calcification: Noted on previous CT imaging.  Stress perfusion study in 2017 without evidence of ischemia.  Also had stress perfusion study 2022 without evidence of ischemia, considered low risk.  Continue with aggressive risk factor modification and regular exercise.  We discussed gym membership in the winter months and he is going to look into Silver sneakers.  3. Hypertension: His blood pressure is always well controlled at doctors office visits.  I rechecked it today it was 132/72 on recheck.  He is however going to monitor at home and call staying greater than 130/80 on average.  4. Renal insufficiency: PCP following  5. Diabetes: Managed by outside provider  6. Obstructive sleep apnea on CPAP  7. Normal vascular screening study 2022  8. Pulmonary nodule: Pulmonology following and stable on 5/2022 CT  9. Questionable mitral valve prolapse: Not mentioned on 6/2021 echo.    Patient to follow-up with Dr. Hughes in 6 months or follow-up sooner if needed for any new, recurrent, or worsening symptoms or concerns.  Discussed in detail signs/symptoms that warrant sooner call or follow-up to the office or ER visit.           Your medication list          Accurate as of November 16, 2022  9:50 AM. If you have any questions, ask your nurse or doctor.            CONTINUE taking these medications      Instructions Last Dose Given Next Dose Due   aspirin 81 MG EC tablet      Take 1 tab po bid x 14 days; then take 1 tab po daily x 28 days       atorvastatin 40 MG tablet  Commonly known as: LIPITOR      Take 40 mg by mouth Daily.       COQ-10 PO      Take  by mouth.       fish oil 1000 MG capsule capsule      Take  by mouth Daily With Breakfast.       losartan 50 MG tablet  Commonly known as: COZAAR      Take 50 mg by mouth Daily.       metFORMIN 1000 MG tablet  Commonly known as: GLUCOPHAGE      Take 1,000 mg by mouth 2 (Two) Times a Day With Meals.       metoprolol tartrate 50 MG  tablet  Commonly known as: LOPRESSOR      Take 25 mg by mouth Daily.       multivitamin tablet tablet      Take  by mouth Daily.       Symbicort 160-4.5 MCG/ACT inhaler  Generic drug: budesonide-formoterol      Inhale 2 puffs 2 (Two) Times a Day. 2 PUFFS IN THE AM 1 PUFF AT BEDTIME              The above medication changes may not have been made by this provider.  Patient's medication list was updated to reflect medications they are currently taking including medication changes made by other providers.            Thanks,    Coral Gonzales, DNP, APRN  11/16/2022         Dictated utilizing Dragon dictation

## 2023-01-18 ENCOUNTER — TRANSCRIBE ORDERS (OUTPATIENT)
Dept: ADMINISTRATIVE | Facility: HOSPITAL | Age: 83
End: 2023-01-18
Payer: MEDICARE

## 2023-01-18 DIAGNOSIS — R91.8 LUNG NODULES: Primary | ICD-10-CM

## 2023-05-17 ENCOUNTER — HOSPITAL ENCOUNTER (OUTPATIENT)
Dept: CT IMAGING | Facility: HOSPITAL | Age: 83
Discharge: HOME | End: 2023-05-17
Admitting: NURSE PRACTITIONER
Payer: MEDICARE

## 2023-05-17 DIAGNOSIS — R91.8 LUNG NODULES: ICD-10-CM

## 2023-05-17 PROCEDURE — 71250 CT THORAX DX C-: CPT

## 2023-06-02 ENCOUNTER — OFFICE VISIT (OUTPATIENT)
Dept: CARDIOLOGY | Facility: CLINIC | Age: 83
End: 2023-06-02
Payer: MEDICARE

## 2023-06-02 VITALS
BODY MASS INDEX: 31.35 KG/M2 | HEIGHT: 70 IN | HEART RATE: 64 BPM | SYSTOLIC BLOOD PRESSURE: 128 MMHG | DIASTOLIC BLOOD PRESSURE: 70 MMHG | WEIGHT: 219 LBS

## 2023-06-02 DIAGNOSIS — N18.9 CHRONIC RENAL IMPAIRMENT, UNSPECIFIED CKD STAGE: ICD-10-CM

## 2023-06-02 DIAGNOSIS — E11.59 TYPE 2 DIABETES MELLITUS WITH OTHER CIRCULATORY COMPLICATION, WITHOUT LONG-TERM CURRENT USE OF INSULIN: ICD-10-CM

## 2023-06-02 DIAGNOSIS — I25.10 CORONARY ARTERY DISEASE INVOLVING NATIVE CORONARY ARTERY OF NATIVE HEART WITHOUT ANGINA PECTORIS: Primary | ICD-10-CM

## 2023-06-02 DIAGNOSIS — I71.20 THORACIC AORTIC ANEURYSM WITHOUT RUPTURE, UNSPECIFIED PART: ICD-10-CM

## 2023-06-02 DIAGNOSIS — I10 PRIMARY HYPERTENSION: ICD-10-CM

## 2023-06-02 NOTE — PROGRESS NOTES
Date of Office Visit: 2023  Encounter Provider: Giana Hughes MD  Place of Service: [facility] CARDIOLOGY  Patient Name: Steve Mace  :1940    Chief complaint  History of pericardial disease, thoracic aortic aneurysm, coronary artery disease    History of Present Illness  Patient is a pleasant 83-year-old gentleman with diabetes, hypertension, hyperlipidemia.  In  he was seen for atypical chest pain.  A CT scan of his chest revealed a large pericardial effusion which was treated with nonsteroidals and resolution of his symptoms and effusion.  Echocardiogram revealed left ventricular dilatation with an ejection fraction of 45-50%, no significant valvular heart disease and aortic root size of 4.3 cm.  He had a PET scan that was negative for ischemia.  By  with medical therapy his ejection fraction normalized to 50%.  He was noted to have mitral valve prolapse without regurgitation.  He also noted to have multivessel coronary artery disease by coronary CT angiography.  In May 2017 he had an echocardiogram revealed an ejection fraction of 60% with an aortic root measured at 4.0 cm.  Ascending aorta was measured 3.7 cm.  Aortic valve is trileaflet and functioning normally.  He had a stress perfusion study that was negative for ischemia.  The right ventricle was enlarged though not noted by echocardiography.  CT angiogram of his chest in 2019 with ascending aorta was felt to be mildly dilated and unchanged and measuring 3.9 cm with mild coronary artery calcification.  Most recent CT scan of the chest without contrast performed on 2023 showed stable pulmonary nodules and ascending aorta measuring 4 cm.  With dyspnea symptoms and 2022 stress perfusion study was negative for ischemia.    Since last visit he is playing golf 3 times a week he denies any chest pain, palpitations, syncope near syncope.  He has just started back using his CPAP after taking a 3-month  hiatus.  He is also struggling with neck pain with radiculopathy.  [facility] and he believes he is well within acceptable limits but he did not bring a copy but will forward these to me..    Past Medical History:   Diagnosis Date    Acute myopericarditis     Arthritis     OSTEOARTHRITIS    Chronic renal insufficiency     COPD (chronic obstructive pulmonary disease)     Coronary artery disease     Diabetes     Hyperlipidemia     Hypertension     Knee pain, right     Knee swelling 2019    HAVE HAD 3 CORTISONE SHOTS    Lumbosacral disc disease 1970'S    Mitral valve prolapse     Prostate cancer     Sleep apnea     CPAP-SOME USE, PROVIDED EDUCATION ON SAFE SURGERY SALE SLEEP    Thoracic aortic aneurysm without rupture      Past Surgical History:   Procedure Laterality Date    COLONOSCOPY      PROSTATECTOMY      TOTAL KNEE ARTHROPLASTY Right 10/22/2021    Procedure: TOTAL KNEE ARTHROPLASTY;  Surgeon: Iban Corrales MD;  Location: [facility];  Service: Orthopedics;  Laterality: Right;     Outpatient Medications Prior to Visit   Medication Sig Dispense Refill    aspirin 81 MG EC tablet Take 1 tab po bid x 14 days; then take 1 tab po daily x 28 days (Patient taking differently: Take 1 tablet by mouth Daily.) 60 tablet 0    atorvastatin (LIPITOR) 40 MG tablet Take 1 tablet by mouth Daily.      Coenzyme Q10 (COQ-10 PO) Take  by mouth.      empagliflozin (Jardiance) 25 MG tablet tablet Take  by mouth Daily.      losartan (COZAAR) 50 MG tablet Take 1 tablet by mouth Daily.      metFORMIN (GLUCOPHAGE) 1000 MG tablet Take 1 tablet by mouth 2 (Two) Times a Day With Meals.      metoprolol tartrate (LOPRESSOR) 50 MG tablet Take 25 mg by mouth Daily.      multivitamin (THERAGRAN) tablet tablet Take  by mouth Daily.      Omega-3 Fatty Acids (fish oil) 1000 MG capsule capsule Take  by mouth Daily With Breakfast.      SYMBICORT 160-4.5 MCG/ACT inhaler Inhale 2 puffs 2 (Two) Times a Day. 2 PUFFS IN  "THE AM 1 PUFF AT BEDTIME       Facility-Administered Medications Prior to Visit   Medication Dose Route Frequency Provider Last Rate Last Admin    Chlorhexidine Gluconate Cloth 2 % pads   Apply externally BID Mindy Bustos APRN        Chlorhexidine Gluconate Cloth 2 % pads   Apply externally Take As Directed Iban Corrales MD           Allergies as of 2023    (No Known Allergies)     Social History     Socioeconomic History    Marital status:    Tobacco Use    Smoking status: Former     Packs/day: 1.00     Years: 20.00     Pack years: 20.00     Types: Cigarettes     Quit date: 10/18/1996     Years since quittin.6    Smokeless tobacco: Never   Vaping Use    Vaping Use: Never used   Substance and Sexual Activity    Alcohol use: Yes     Comment: FEW DRINKS A NIGHT  caffeine- coffee daily    Drug use: No    Sexual activity: Yes     Partners: Female     Family History   Problem Relation Age of Onset    Heart attack Father     Diabetes Father     Heart disease Brother     Heart attack Brother     Diabetes Other     Diabetes Maternal Grandfather     Malig Hyperthermia Neg Hx      Review of Systems   Constitutional: Negative for chills, fever, weight gain and weight loss.   Cardiovascular:  Negative for leg swelling.   Respiratory:  Negative for cough, snoring and wheezing.    Hematologic/Lymphatic: Negative for bleeding problem. Bruises/bleeds easily.   Skin:  Negative for color change.   Musculoskeletal:  Positive for joint pain. Negative for falls and myalgias.   Gastrointestinal:  Negative for melena.   Genitourinary:  Negative for hematuria.   Neurological:  Negative for excessive daytime sleepiness.   Psychiatric/Behavioral:  Negative for depression. The patient is not nervous/anxious.       Objective:     Vitals:    23 1030   BP: 128/70   Pulse: 64   Weight: 99.3 kg (219 lb)   Height: 177.8 cm (70\")     Body mass index is 31.42 kg/m².    Vitals reviewed.   Constitutional:       " Appearance: Well-developed. Obese.   Eyes:      General: No scleral icterus.        Right eye: No discharge.      Conjunctiva/sclera: Conjunctivae normal.      Pupils: Pupils are equal, round, and reactive to light.   HENT:      Head: Normocephalic.      Nose: Nose normal.   Neck:      Thyroid: No thyromegaly.      Vascular: No JVD.   Pulmonary:      Effort: Pulmonary effort is normal. No respiratory distress.      Breath sounds: Normal breath sounds. No wheezing. No rales.   Cardiovascular:      Normal rate. Regular rhythm. Normal S1. Normal S2.       Murmurs: There is no murmur.      No gallop.    Pulses:     Intact distal pulses.   Edema:     Peripheral edema absent.   Abdominal:      General: Bowel sounds are normal. There is no distension.      Palpations: Abdomen is soft.      Tenderness: There is no abdominal tenderness. There is no rebound.   Musculoskeletal: Normal range of motion.         General: No tenderness.      Cervical back: Normal range of motion and neck supple. Skin:     General: Skin is warm and dry.      Findings: No erythema or rash.   Neurological:      Mental Status: Alert and oriented to person, place, and time.   Psychiatric:         Behavior: Behavior normal.         Thought Content: Thought content normal.         Judgment: Judgment normal.     Lab Review:         ECG 12 Lead    Date/Time: 6/2/2023 10:44 AM  Performed by: Giana Hughes MD  Authorized by: Giana Hughes MD   Comparison: compared with previous ECG   Similar to previous ECG  Rhythm: sinus rhythm  Conduction: non-specific intraventricular conduction delay  Other findings: left ventricular hypertrophy    Clinical impression: abnormal EKG      Assessment:       Diagnosis Plan   1. Coronary artery disease involving native coronary artery of native heart without angina pectoris  ECG 12 Lead      2. Thoracic aortic aneurysm without rupture, unspecified part  ECG 12 Lead      3. Primary hypertension        4. Chronic renal  impairment, unspecified CKD stage        5. Type 2 diabetes mellitus with other circulatory complication, without long-term current use of insulin          Plan:       1.  Thoracic aortic aneurysm, CT 5/2023 was 4.0 cm.  We will check a year.  If this is stable then we will continue with contrast CT every other year.  Angiogram did not visualize aortic root while in the past.  2.  Hypertension.  Controlled  3.  Renal insufficiency.  Stable per pt on recent labs per patient  4.  Coronary artery calcification.  No anginal symptoms with no ischemia on stress test 6/2022.  6.  Diabetes  6.  Questionable mitral valve prolapse  7.  Dyslipidemia.  He will forward a copy of labs  8.  Obstructive sleep apnea, on therapy  9.  Vascular screening. He will schedule a vascular screening. Last one 5 yrs ago  10. Pulmonary nodule.  Again stable on recent CT scan 5/2023  11.  RAULITO      Time Spent: I spent 25 minutes caring for Steve on this date of service. This time includes time spent by me in the following activities: preparing for the visit, reviewing tests, obtaining and/or reviewing a separately obtained history, performing a medically appropriate examination and/or evaluation, counseling and educating the patient/family/caregiver, documenting information in the medical record, and independently interpreting results and communicating that information with the patient/family/caregiver.   I spent 1 minutes on the separately reported service of ECG. This time is not included in the time used to support the E/M service also reported today.        Your medication list            Accurate as of June 2, 2023 11:59 PM. If you have any questions, ask your nurse or doctor.                CHANGE how you take these medications        Instructions Last Dose Given Next Dose Due   aspirin 81 MG EC tablet  What changed:   how much to take  how to take this  when to take this  additional instructions      Take 1 tab po bid x 14 days; then take 1  tab po daily x 28 days              CONTINUE taking these medications        Instructions Last Dose Given Next Dose Due   atorvastatin 40 MG tablet  Commonly known as: LIPITOR      Take 1 tablet by mouth Daily.       COQ-10 PO      Take  by mouth.       fish oil 1000 MG capsule capsule      Take  by mouth Daily With Breakfast.       Jardiance 25 MG tablet tablet  Generic drug: empagliflozin      Take  by mouth Daily.       losartan 50 MG tablet  Commonly known as: COZAAR      Take 1 tablet by mouth Daily.       metFORMIN 1000 MG tablet  Commonly known as: GLUCOPHAGE      Take 1 tablet by mouth 2 (Two) Times a Day With Meals.       metoprolol tartrate 50 MG tablet  Commonly known as: LOPRESSOR      Take 25 mg by mouth Daily.       multivitamin tablet tablet      Take  by mouth Daily.       Symbicort 160-4.5 MCG/ACT inhaler  Generic drug: budesonide-formoterol      Inhale 2 puffs 2 (Two) Times a Day. 2 PUFFS IN THE AM 1 PUFF AT BEDTIME                Patient is no longer taking -.  I corrected the med list to reflect this.  I did not stop these medications.      Dictated utilizing Dragon dictation

## 2023-06-05 ENCOUNTER — TELEPHONE (OUTPATIENT)
Dept: CARDIOLOGY | Facility: CLINIC | Age: 83
End: 2023-06-05
Payer: MEDICARE

## 2023-06-15 NOTE — TELEPHONE ENCOUNTER
Lipids still not at goal. Would discuss with PCP increasing statin. Would also try to increase activity as tolerated and follow a low cholesterol diet.

## 2024-06-03 ENCOUNTER — OFFICE VISIT (OUTPATIENT)
Dept: CARDIOLOGY | Facility: CLINIC | Age: 84
End: 2024-06-03
Payer: MEDICARE

## 2024-06-03 VITALS
BODY MASS INDEX: 31.35 KG/M2 | WEIGHT: 219 LBS | DIASTOLIC BLOOD PRESSURE: 68 MMHG | HEIGHT: 70 IN | HEART RATE: 71 BPM | SYSTOLIC BLOOD PRESSURE: 108 MMHG

## 2024-06-03 DIAGNOSIS — I25.810 CORONARY ARTERY DISEASE INVOLVING CORONARY BYPASS GRAFT OF NATIVE HEART WITHOUT ANGINA PECTORIS: ICD-10-CM

## 2024-06-03 DIAGNOSIS — I25.10 CORONARY ARTERY DISEASE INVOLVING NATIVE CORONARY ARTERY OF NATIVE HEART WITHOUT ANGINA PECTORIS: ICD-10-CM

## 2024-06-03 DIAGNOSIS — I10 PRIMARY HYPERTENSION: Primary | ICD-10-CM

## 2024-06-03 DIAGNOSIS — I71.21 ANEURYSM OF ASCENDING AORTA WITHOUT RUPTURE: ICD-10-CM

## 2024-06-03 PROCEDURE — 1160F RVW MEDS BY RX/DR IN RCRD: CPT | Performed by: INTERNAL MEDICINE

## 2024-06-03 PROCEDURE — 3074F SYST BP LT 130 MM HG: CPT | Performed by: INTERNAL MEDICINE

## 2024-06-03 PROCEDURE — 99214 OFFICE O/P EST MOD 30 MIN: CPT | Performed by: INTERNAL MEDICINE

## 2024-06-03 PROCEDURE — 3078F DIAST BP <80 MM HG: CPT | Performed by: INTERNAL MEDICINE

## 2024-06-03 PROCEDURE — 1159F MED LIST DOCD IN RCRD: CPT | Performed by: INTERNAL MEDICINE

## 2024-06-03 PROCEDURE — 93000 ELECTROCARDIOGRAM COMPLETE: CPT | Performed by: INTERNAL MEDICINE

## 2024-06-03 NOTE — PROGRESS NOTES
Date of Office Visit: 2024  Encounter Provider: Giana Hughes MD  Place of Service: [facility] CARDIOLOGY  Patient Name: Steve Mace  :1940    Chief complaint  History of pericardial disease, thoracic aortic aneurysm, coronary artery disease    History of Present Illness  Patient is a pleasant 84-year-old gentleman with diabetes, hypertension, hyperlipidemia.  In  he was seen for atypical chest pain.  A CT scan of his chest revealed a large pericardial effusion which was treated with nonsteroidals and resolution of his symptoms and effusion.  Echocardiogram revealed left ventricular dilatation with an ejection fraction of 45-50%, no significant valvular heart disease and aortic root size of 4.3 cm.  He had a PET scan that was negative for ischemia.  By  with medical therapy his ejection fraction normalized to 50%.  He was noted to have mitral valve prolapse without regurgitation.  He also noted to have multivessel coronary artery disease by coronary CT angiography.  In May 2017 he had an echocardiogram revealed an ejection fraction of 60% with an aortic root measured at 4.0 cm.  Ascending aorta was measured 3.7 cm.  Aortic valve is trileaflet and functioning normally.  He had a stress perfusion study that was negative for ischemia.  The right ventricle was enlarged though not noted by echocardiography.  CT angiogram of his chest in 2019 with ascending aorta was felt to be mildly dilated and unchanged and measuring 3.9 cm with mild coronary artery calcification.  Most recent CT scan of the chest without contrast performed on 2023 showed stable pulmonary nodules and ascending aorta measuring 4 cm.  With dyspnea symptoms and 2022 stress perfusion study was negative for ischemia.  Vascular screening study and 2022 was normal.      Since last visit he states that he has had palpitations that occurs once every 3 months for 5 seconds intermittently over the  past 2 years.  They are very brief and not bothersome.  He has had no shortness of breath or chest pain.  He has minimal dependent edema.  He has had dizziness with standing suddenly over the past 2 months.  His blood pressure checked a month ago was noted to be systolics of 117 in the office however at his home readings have been in the 130s.  He has not checked his device recently.  He is using CPAP consistently.      Past Medical History:   Diagnosis Date    Acute myopericarditis     Arthritis     OSTEOARTHRITIS    Chronic renal insufficiency     COPD (chronic obstructive pulmonary disease)     Coronary artery disease     Diabetes     Hyperlipidemia     Hypertension     Knee pain, right     Knee swelling 2019    HAVE HAD 3 CORTISONE SHOTS    Lumbosacral disc disease 1970'S    Mitral valve prolapse     Prostate cancer     Sleep apnea     CPAP-SOME USE, PROVIDED EDUCATION ON SAFE SURGERY SALE SLEEP    Thoracic aortic aneurysm without rupture      Past Surgical History:   Procedure Laterality Date    COLONOSCOPY      PROSTATECTOMY      TOTAL KNEE ARTHROPLASTY Right 10/22/2021    Procedure: TOTAL KNEE ARTHROPLASTY;  Surgeon: Iban Corrales MD;  Location: [facility];  Service: Orthopedics;  Laterality: Right;     Outpatient Medications Prior to Visit   Medication Sig Dispense Refill    aspirin 81 MG EC tablet Take 1 tab po bid x 14 days; then take 1 tab po daily x 28 days (Patient taking differently: Take 1 tablet by mouth Daily.) 60 tablet 0    atorvastatin (LIPITOR) 40 MG tablet Take 1 tablet by mouth Daily.      Coenzyme Q10 (COQ-10 PO) Take  by mouth.      empagliflozin (Jardiance) 25 MG tablet tablet Take  by mouth Daily.      losartan (COZAAR) 50 MG tablet Take 1 tablet by mouth Daily.      metFORMIN (GLUCOPHAGE) 1000 MG tablet Take 1 tablet by mouth 2 (Two) Times a Day With Meals.      metoprolol tartrate (LOPRESSOR) 50 MG tablet Take 0.5 tablets by mouth Daily.      multivitamin (THERAGRAN) tablet tablet  Take  by mouth Daily.      Omega-3 Fatty Acids (fish oil) 1000 MG capsule capsule Take  by mouth Daily With Breakfast.      SYMBICORT 160-4.5 MCG/ACT inhaler Inhale 2 puffs 2 (Two) Times a Day. 2 PUFFS IN THE AM 1 PUFF AT BEDTIME       Facility-Administered Medications Prior to Visit   Medication Dose Route Frequency Provider Last Rate Last Admin    Chlorhexidine Gluconate Cloth 2 % pads   Apply externally BID Mindy Bustos APRN        Chlorhexidine Gluconate Cloth 2 % pads   Apply externally Take As Directed Iban Corrales MD           Allergies as of 2024    (No Known Allergies)     Social History     Socioeconomic History    Marital status:    Tobacco Use    Smoking status: Former     Current packs/day: 0.00     Average packs/day: 1 pack/day for 20.0 years (20.0 ttl pk-yrs)     Types: Cigarettes     Start date: 10/18/1976     Quit date: 10/18/1996     Years since quittin.6    Smokeless tobacco: Never   Vaping Use    Vaping status: Never Used   Substance and Sexual Activity    Alcohol use: Yes     Comment: FEW DRINKS A NIGHT  caffeine- coffee daily    Drug use: No    Sexual activity: Yes     Partners: Female     Family History   Problem Relation Age of Onset    Heart attack Father     Diabetes Father     Heart disease Brother     Heart attack Brother     Diabetes Other     Diabetes Maternal Grandfather     Malig Hyperthermia Neg Hx      Review of Systems   Constitutional: Negative for chills, fever, weight gain and weight loss.   Cardiovascular:  Positive for palpitations. Negative for leg swelling.   Respiratory:  Negative for cough, snoring and wheezing.    Hematologic/Lymphatic: Negative for bleeding problem. Does not bruise/bleed easily.   Skin:  Negative for color change.   Musculoskeletal:  Negative for falls, joint pain and myalgias.   Gastrointestinal:  Negative for melena.   Genitourinary:  Negative for hematuria.   Neurological:  Positive for dizziness. Negative for excessive  "daytime sleepiness.   Psychiatric/Behavioral:  Negative for depression. The patient is not nervous/anxious.         Objective:     Vitals:    06/03/24 1225   BP: 108/68   Pulse: 71   Weight: 99.3 kg (219 lb)   Height: 177.8 cm (70\")     Body mass index is 31.42 kg/m².    Vitals reviewed.   Constitutional:       Appearance: Well-developed. Obese.   Eyes:      General: No scleral icterus.        Right eye: No discharge.      Conjunctiva/sclera: Conjunctivae normal.      Pupils: Pupils are equal, round, and reactive to light.   HENT:      Head: Normocephalic.      Nose: Nose normal.   Neck:      Thyroid: No thyromegaly.      Vascular: No JVD.   Pulmonary:      Effort: Pulmonary effort is normal. No respiratory distress.      Breath sounds: Normal breath sounds. No wheezing. No rales.   Cardiovascular:      Normal rate. Regular rhythm. Normal S1. Normal S2.       Murmurs: There is no murmur.      No gallop.    Pulses:     Intact distal pulses.      Carotid: 2+ bilaterally.     Radial: 2+ bilaterally.     Femoral: 2+ bilaterally.     Popliteal: 2+ bilaterally.     Dorsalis pedis: 2+ bilaterally.     Posterior tibial: 2+ bilaterally.  Edema:     Peripheral edema absent.   Abdominal:      General: Bowel sounds are normal. There is no distension.      Palpations: Abdomen is soft.      Tenderness: There is no abdominal tenderness. There is no rebound.   Musculoskeletal: Normal range of motion.         General: No tenderness.      Cervical back: Normal range of motion and neck supple. Skin:     General: Skin is warm and dry.      Findings: No erythema or rash.   Neurological:      Mental Status: Alert and oriented to person, place, and time.   Psychiatric:         Behavior: Behavior normal.         Thought Content: Thought content normal.         Judgment: Judgment normal.       Lab Review:         ECG 12 Lead    Date/Time: 6/3/2024 1:00 PM  Performed by: Giana Hughes MD    Authorized by: Giana Hughes MD  Comparison: compared " with previous ECG   Similar to previous ECG  Rhythm: sinus rhythm  Conduction: non-specific intraventricular conduction delay  Other findings: non-specific ST-T wave changes    Clinical impression: abnormal EKG            Diagnosis Plan   1. Primary hypertension  ECG 12 Lead      2. Aneurysm of ascending aorta without rupture  CT Chest Without Contrast      3. Coronary artery disease involving coronary bypass graft of native heart without angina pectoris        4. Coronary artery disease involving native coronary artery of native heart without angina pectoris          Plan:       1.  Thoracic aortic aneurysm, CT 5/2023 was 4.0 cm.  Will check a noncontrast CT chest with 3D reconstruction.  If this remains stable we will go to CT scan every other year.  2.  Hypertension.  Uncontrolled his blood pressure device is not working well as he has dizziness consistent with orthostasis and blood pressure slightly low.  [facility]  3.  Renal insufficiency.  [facility] will [facility] these.  4.  Coronary artery calcification.  No anginal symptoms with no ischemia on stress test 6/2022.  6.  Diabetes  6.  Questionable mitral valve prolapse  7.  Dyslipidemia.  He will forward a copy of labs  8.  Obstructive sleep apnea, on therapy  9.  Vascular screening.  Negative vascular screening study 6/2022  10. Pulmonary nodule.  Again stable on recent CT scan 5/2023.  Repeat CT pending.  11.  RAULITO, on CPAP      Time Spent: I spent 35 minutes caring for Steve on this date of service. This time includes time spent by me in the following activities: preparing for the visit, reviewing tests, obtaining and/or reviewing a separately obtained history, performing a medically appropriate examination and/or evaluation, counseling and educating the patient/family/caregiver, ordering medications, tests, or procedures, documenting information in the medical record, and  independently interpreting results and communicating that information with the patient/family/caregiver.   I spent 1 minutes on the separately reported service of ECG. This time is not included in the time used to support the E/M service also reported today.        Your medication list            Accurate as of Alisha 3, 2024 11:59 PM. If you have any questions, ask your nurse or doctor.                CHANGE how you take these medications        Instructions Last Dose Given Next Dose Due   aspirin 81 MG EC tablet  What changed:   how much to take  how to take this  when to take this  additional instructions      Take 1 tab po bid x 14 days; then take 1 tab po daily x 28 days              CONTINUE taking these medications        Instructions Last Dose Given Next Dose Due   atorvastatin 40 MG tablet  Commonly known as: LIPITOR      Take 1 tablet by mouth Daily.       COQ-10 PO      Take  by mouth.       fish oil 1000 MG capsule capsule      Take  by mouth Daily With Breakfast.       Jardiance 25 MG tablet tablet  Generic drug: empagliflozin      Take  by mouth Daily.       losartan 50 MG tablet  Commonly known as: COZAAR      Take 1 tablet by mouth Daily.       metFORMIN 1000 MG tablet  Commonly known as: GLUCOPHAGE      Take 1 tablet by mouth 2 (Two) Times a Day With Meals.       metoprolol tartrate 50 MG tablet  Commonly known as: LOPRESSOR      Take 0.5 tablets by mouth Daily.       multivitamin tablet tablet      Take  by mouth Daily.       Symbicort 160-4.5 MCG/ACT inhaler  Generic drug: budesonide-formoterol      Inhale 2 puffs 2 (Two) Times a Day. 2 PUFFS IN THE AM 1 PUFF AT BEDTIME                Patient is no longer taking -.  I corrected the med list to reflect this.  I did not stop these medications.      Dictated utilizing Dragon dictation

## 2024-06-06 PROBLEM — I25.10 CORONARY ARTERY DISEASE INVOLVING NATIVE CORONARY ARTERY OF NATIVE HEART WITHOUT ANGINA PECTORIS: Status: RESOLVED | Noted: 2017-03-21 | Resolved: 2024-06-06

## 2024-06-10 NOTE — OUTREACH NOTE
If these are new symptoms for her than yes she should be seen sooner    Lani Valencia MD     Total Joint Month 2 Survey      Responses   [facility] patient discharged from? [facility]   Does the patient have one of the following disease processes/diagnoses(primary or secondary)? Total Joint Replacement   Joint surgery performed? Knee   Month 2 attempt successful? Yes   Call start time 1620   Call end time 1623   Has the patient been back in either the hospital or Emergency Department since discharge? No   Is the patient taking all medications as directed (includes completed medication regime)? Yes   Is the patient able to teach back alternate methods of pain control? Ice,  Knee-elevation/no pillow under knee,  Short, frequent activity   Has the patient kept scheduled appointments due by today? Yes   Is the patient still receiving Home Health Services? No   Is the patient still attending therapy sessions(either in the home or as an outpatient)? Yes   Has the patient fallen since discharge? No   If the patient has fallen, were there any injuries? No   What is the patient's perception of their functional status since discharge? Improving   Is the patient able to teach back signs and symptoms of infection? Temp >100.4 for 24h or longer,  Incisional drainage,  Blisters around incision,  Increased swelling or redness around incision (not associated with surgical edema),  Severe discomfort or pain,  Shortness of breath or chest pain,  Changes in mobility   If the patient is a current smoker, are they able to teach back resources for cessation? Not a smoker   Is the patient/caregiver able to teach back the hierarchy of who to call/visit for symptoms/problems? PCP, Specialist, Home health nurse, Urgent Care, ED, 911 Yes   Additional teach back comments He is progressing well, sees surgeon tomorrow. Will resume PT after the holidays.   Month 2 Call Completed? Yes   Wrap up additional comments Improving and going on vacation for [place].          Lupe Ibrahim, RN

## 2024-07-10 ENCOUNTER — HOSPITAL ENCOUNTER (OUTPATIENT)
Dept: CT IMAGING | Facility: HOSPITAL | Age: 84
Discharge: HOME | End: 2024-07-10
Admitting: INTERNAL MEDICINE
Payer: MEDICARE

## 2024-07-10 DIAGNOSIS — I71.21 ANEURYSM OF ASCENDING AORTA WITHOUT RUPTURE: ICD-10-CM

## 2024-07-10 PROCEDURE — 71250 CT THORAX DX C-: CPT

## 2024-07-12 ENCOUNTER — TELEPHONE (OUTPATIENT)
Dept: CARDIOLOGY | Facility: CLINIC | Age: 84
End: 2024-07-12
Payer: MEDICARE

## 2024-07-12 NOTE — TELEPHONE ENCOUNTER
Please let him know that CT of the chest shows stable aortic size.  There is a groundglass opacity in the upper right lobe of the lung that is slightly larger and measures 6 mm now when compared to CT scan done in May 2023.  Radiology recommends follow-up CT scan in 6 to 12 months.  He should discuss this with PCP or pulmonology to determine what follow-up is needed for this.

## 2024-07-12 NOTE — TELEPHONE ENCOUNTER
Results and recommendations called to pt.  Instructed to call with any further questions or concerns.  Verbalized understanding.  Pt states that he will follow up with Dr. Rinaldi, pulmonologist, and requested that I fax over CT report for his review.  Report faxed as requested, successful transmission confirmed per rightfax in EPIC.    Chante Bello RN  Triage Nurse, [facility]  07/12/24 09:06 EDT

## 2024-07-31 ENCOUNTER — TRANSCRIBE ORDERS (OUTPATIENT)
Dept: ADMINISTRATIVE | Facility: HOSPITAL | Age: 84
End: 2024-07-31
Payer: MEDICARE

## 2024-07-31 DIAGNOSIS — R91.8 GROUND GLASS OPACITY PRESENT ON IMAGING OF LUNG: Primary | ICD-10-CM

## 2024-12-04 NOTE — PROGRESS NOTES
Date of Office Visit: 2024  Encounter Provider: CRYS Lizama  Place of Service: [facility] CARDIOLOGY  Patient Name: Steve Mace  :1940    Chief complaint  pericardial disease, thoracic aortic aneurysm, coronary artery disease     History of Present Illness  Patient is a 84 y.o. year old male  patient of Dr. Hughes. Past medical history includes diabetes, hypertension, hyperlipidemia.  In  he was seen for atypical chest pain.  A CT scan of his chest revealed a large pericardial effusion which was treated with nonsteroidals and resolution of his symptoms and effusion.  Echocardiogram revealed left ventricular dilatation with an ejection fraction of 45-50%, no significant valvular heart disease and aortic root size of 4.3 cm.  He had a PET scan that was negative for ischemia.  By  with medical therapy his ejection fraction normalized to 50%.  He was noted to have mitral valve prolapse without regurgitation.  He also noted to have multivessel coronary artery disease by coronary CT angiography.  In May 2017 he had an echocardiogram revealed an ejection fraction of 60% with an aortic root measured at 4.0 cm.  Ascending aorta was measured 3.7 cm.  Aortic valve is trileaflet and functioning normally.  He had a stress perfusion study that was negative for ischemia.  The right ventricle was enlarged though not noted by echocardiography.  CT angiogram of his chest in 2019 with ascending aorta was felt to be mildly dilated and unchanged and measuring 3.9 cm with mild coronary artery calcification.  Most recent CT scan of the chest without contrast performed on 2023 showed stable pulmonary nodules and ascending aorta measuring 4 cm.  With dyspnea symptoms and 2022 stress perfusion study was negative for ischemia.  Vascular screening study and 2022 was normal. Repeat CT scan in 2024 showed stable aorta.    Interval history  Patient presents today for  routine follow-up.  I will visit with him today and have reviewed his medical record.  Since last visit he has been doing well.  He is wearing CPAP consistently about 4 hours per night.  He denies palpitations, shortness of breath, edema, dizziness, chest pain or chest pressure, fatigue, syncope or presyncope.  Blood pressure today is very well-controlled and he reports similar readings at other providers.    Past Medical History:   Diagnosis Date    Acute myopericarditis     Arthritis     OSTEOARTHRITIS    Chronic renal insufficiency     COPD (chronic obstructive pulmonary disease)     Coronary artery disease     Diabetes     Hyperlipidemia     Hypertension     Knee pain, right     Knee swelling 2019    HAVE HAD 3 CORTISONE SHOTS    Lumbosacral disc disease 1970'S    Mitral valve prolapse     Prostate cancer     Sleep apnea     CPAP-SOME USE, PROVIDED EDUCATION ON SAFE SURGERY SALE SLEEP    Thoracic aortic aneurysm without rupture      Past Surgical History:   Procedure Laterality Date    COLONOSCOPY      PROSTATECTOMY      TOTAL KNEE ARTHROPLASTY Right 10/22/2021    Procedure: TOTAL KNEE ARTHROPLASTY;  Surgeon: Iban Corrales MD;  Location: [facility];  Service: Orthopedics;  Laterality: Right;     Outpatient Medications Prior to Visit   Medication Sig Dispense Refill    aspirin 81 MG EC tablet Take 1 tab po bid x 14 days; then take 1 tab po daily x 28 days (Patient taking differently: Take 1 tablet by mouth Daily.) 60 tablet 0    atorvastatin (LIPITOR) 40 MG tablet Take 1 tablet by mouth Daily.      Coenzyme Q10 (COQ-10 PO) Take  by mouth.      empagliflozin (Jardiance) 25 MG tablet tablet Take  by mouth Daily.      losartan (COZAAR) 50 MG tablet Take 1 tablet by mouth Daily.      metFORMIN (GLUCOPHAGE) 1000 MG tablet Take 1 tablet by mouth 2 (Two) Times a Day With Meals.      metoprolol tartrate (LOPRESSOR) 50 MG tablet Take 0.5 tablets by mouth Daily.      multivitamin (THERAGRAN) tablet tablet Take  by  mouth Daily.      Omega-3 Fatty Acids (fish oil) 1000 MG capsule capsule Take  by mouth Daily With Breakfast.      SYMBICORT 160-4.5 MCG/ACT inhaler Inhale 2 puffs 2 (Two) Times a Day. 2 PUFFS IN THE AM 1 PUFF AT BEDTIME       Facility-Administered Medications Prior to Visit   Medication Dose Route Frequency Provider Last Rate Last Admin    Chlorhexidine Gluconate Cloth 2 % pads   Apply externally BID Mindy Bustos APRN        Chlorhexidine Gluconate Cloth 2 % pads   Apply externally Take As Directed Iban Corrales MD           Allergies as of 2024    (No Known Allergies)     Social History     Socioeconomic History    Marital status:    Tobacco Use    Smoking status: Former     Current packs/day: 0.00     Average packs/day: 1 pack/day for 20.0 years (20.0 ttl pk-yrs)     Types: Cigarettes     Start date: 10/18/1976     Quit date: 10/18/1996     Years since quittin.1    Smokeless tobacco: Never   Vaping Use    Vaping status: Never Used   Substance and Sexual Activity    Alcohol use: Yes     Comment: FEW DRINKS A NIGHT  caffeine- coffee daily    Drug use: No    Sexual activity: Yes     Partners: Female     Family History   Problem Relation Age of Onset    Heart attack Father     Diabetes Father     Heart disease Brother     Heart attack Brother     Diabetes Other     Diabetes Maternal Grandfather     Malig Hyperthermia Neg Hx      Review of Systems   Constitutional: Negative for malaise/fatigue.   Cardiovascular:  Negative for chest pain, claudication, dyspnea on exertion, leg swelling, near-syncope, orthopnea, palpitations, paroxysmal nocturnal dyspnea and syncope.   Respiratory:  Negative for shortness of breath.    Neurological:  Negative for brief paralysis, dizziness, headaches and light-headedness.   All other systems reviewed and are negative.       Objective:     Vitals:    24 1303   BP: 115/82   BP Location: Left arm   Patient Position: Sitting   Cuff Size: Adult   Pulse: 73  "  SpO2: 98%   Weight: 100 kg (221 lb 3.2 oz)   Height: 177.8 cm (70\")     Body mass index is 31.74 kg/m².    Vitals reviewed.   Constitutional:       General: Not in acute distress.     Appearance: Well-developed and not in distress. Not diaphoretic.   HENT:      Head: Normocephalic.   Pulmonary:      Effort: Pulmonary effort is normal. No respiratory distress.      Breath sounds: Normal breath sounds. No wheezing. No rhonchi. No rales.   Cardiovascular:      Normal rate. Regular rhythm.      Murmurs: There is no murmur.   Pulses:     Radial: 2+ bilaterally.  Edema:     Peripheral edema absent.   Skin:     General: Skin is warm and dry. There is no cyanosis.      Findings: No rash.   Neurological:      Mental Status: Alert and oriented to person, place, and time.   Psychiatric:         Behavior: Behavior normal. Behavior is cooperative.         Thought Content: Thought content normal.         Judgment: Judgment normal.       Lab Review:     Lab Results   Component Value Date     10/12/2021     09/09/2021    K 4.5 10/12/2021    K 4.8 09/09/2021     10/12/2021     09/09/2021    CO2 21.0 (L) 10/12/2021    CO2 26.6 09/09/2021    BUN 19 10/12/2021    BUN 21 09/09/2021    CREATININE 1.12 10/12/2021    CREATININE 1.04 09/09/2021    EGFRIFNONA 63 10/12/2021    EGFRIFNONA 69 09/09/2021    GLUCOSE 144 (H) 10/12/2021    GLUCOSE 147 (H) 09/09/2021    CALCIUM 9.0 10/12/2021    CALCIUM 9.6 09/09/2021     Lab Results   Component Value Date    WBC 7.13 10/12/2021    WBC 5.93 09/09/2021    HGB 13.8 10/23/2021    HGB 15.1 10/12/2021    HCT 41.9 10/23/2021    HCT 45.5 10/12/2021    MCV 91.4 10/12/2021    MCV 92.5 09/09/2021     10/12/2021     09/09/2021            ECG 12 Lead    Date/Time: 12/5/2024 1:35 PM  Performed by: Sole Blanco APRN    Authorized by: Sole Blanco APRN  Comparison: compared with previous ECG   Similar to previous ECG  Rhythm: sinus rhythm  Rate: normal  BPM: " 73  QRS axis: normal  Comments: Similar to prior        Assessment:       Diagnosis Plan   1. Coronary artery disease involving native coronary artery of native heart without angina pectoris        2. Aneurysm of ascending aorta without rupture        3. Primary hypertension        4. Chronic renal impairment, unspecified CKD stage        5. History of solitary pulmonary nodule        6. RAULITO (obstructive sleep apnea)          Plan:       1.  Thoracic aortic aneurysm, CT 5/2023 was 4.0 cm.  CT scan in July 2024 showed stable aortic size.  He is to have serial CT scans with pulmonology for evaluation of groundglass opacities we can assess aorta at the same time on the scans.  2.  Hypertension.  Well-controlled on current regimen.  Dizziness and orthostasis have resolved.  3.  Renal insufficiency.  [facility]  4.  Coronary artery calcification.  No anginal symptoms with no ischemia on stress test 6/2022.  6.  Diabetes  6.  Questionable mitral valve prolapse  7.  Dyslipidemia.  He will forward a copy of labs  8.  Obstructive sleep apnea, on [facility]  9.  Vascular screening.  Negative vascular screening study 6/2022  10. Pulmonary nodule.  Again stable on recent CT scan 5/2023.  Repeat CT pending.  11.  RAULITO, on CPAP      Time Spent: I spent 30 minutes caring for Steve on this date of service. This time includes time spent by me in the following activities: preparing for the visit, reviewing tests, performing a medically appropriate examination and/or evaluations, counseling and educating the patient/family/caregiver, ordering medications, tests, or procedures, documenting information in the medical record, and independently interpreting results and communicating that information with the patient/family/caregiver.   I spent 1 minutes on the separately reported service of ECG. This time is not included in the time used to support the E/M service also reported  today.        Your medication list            Accurate as of December 5, 2024  1:39 PM. If you have any questions, ask your nurse or doctor.                CHANGE how you take these medications        Instructions Last Dose Given Next Dose Due   aspirin 81 MG EC tablet  What changed:   how much to take  how to take this  when to take this  additional instructions      Take 1 tab po bid x 14 days; then take 1 tab po daily x 28 days              CONTINUE taking these medications        Instructions Last Dose Given Next Dose Due   atorvastatin 40 MG tablet  Commonly known as: LIPITOR      Take 1 tablet by mouth Daily.       COQ-10 PO      Take  by mouth.       fish oil 1000 MG capsule capsule      Take  by mouth Daily With Breakfast.       Jardiance 25 MG tablet tablet  Generic drug: empagliflozin      Take  by mouth Daily.       losartan 50 MG tablet  Commonly known as: COZAAR      Take 1 tablet by mouth Daily.       metFORMIN 1000 MG tablet  Commonly known as: GLUCOPHAGE      Take 1 tablet by mouth 2 (Two) Times a Day With Meals.       metoprolol tartrate 50 MG tablet  Commonly known as: LOPRESSOR      Take 0.5 tablets by mouth Daily.       multivitamin tablet tablet      Take  by mouth Daily.       Symbicort 160-4.5 MCG/ACT inhaler  Generic drug: budesonide-formoterol      Inhale 2 puffs 2 (Two) Times a Day. 2 PUFFS IN THE AM 1 PUFF AT BEDTIME                Patient is no longer taking -.  I corrected the med list to reflect this.  I did not stop these medications.    Return in about 6 months (around 6/5/2025) for with Dr. Hughes.      Dictated utilizing Dragon dictation

## 2024-12-05 ENCOUNTER — OFFICE VISIT (OUTPATIENT)
Dept: CARDIOLOGY | Facility: CLINIC | Age: 84
End: 2024-12-05
Payer: MEDICARE

## 2024-12-05 VITALS
WEIGHT: 221.2 LBS | DIASTOLIC BLOOD PRESSURE: 82 MMHG | BODY MASS INDEX: 31.67 KG/M2 | HEIGHT: 70 IN | SYSTOLIC BLOOD PRESSURE: 115 MMHG | OXYGEN SATURATION: 98 % | HEART RATE: 73 BPM

## 2024-12-05 DIAGNOSIS — I25.10 CORONARY ARTERY DISEASE INVOLVING NATIVE CORONARY ARTERY OF NATIVE HEART WITHOUT ANGINA PECTORIS: Primary | ICD-10-CM

## 2024-12-05 DIAGNOSIS — I10 PRIMARY HYPERTENSION: ICD-10-CM

## 2024-12-05 DIAGNOSIS — N18.9 CHRONIC RENAL IMPAIRMENT, UNSPECIFIED CKD STAGE: ICD-10-CM

## 2024-12-05 DIAGNOSIS — G47.33 OSA (OBSTRUCTIVE SLEEP APNEA): ICD-10-CM

## 2024-12-05 DIAGNOSIS — Z87.898 HISTORY OF SOLITARY PULMONARY NODULE: ICD-10-CM

## 2024-12-05 DIAGNOSIS — I71.21 ANEURYSM OF ASCENDING AORTA WITHOUT RUPTURE: ICD-10-CM

## 2025-01-16 ENCOUNTER — HOSPITAL ENCOUNTER (OUTPATIENT)
Dept: CT IMAGING | Facility: HOSPITAL | Age: 85
Discharge: HOME | End: 2025-01-16
Admitting: NURSE PRACTITIONER
Payer: MEDICARE

## 2025-01-16 DIAGNOSIS — R91.8 GROUND GLASS OPACITY PRESENT ON IMAGING OF LUNG: ICD-10-CM

## 2025-01-16 PROCEDURE — 71250 CT THORAX DX C-: CPT

## 2025-01-23 ENCOUNTER — TRANSCRIBE ORDERS (OUTPATIENT)
Dept: GENERAL RADIOLOGY | Facility: HOSPITAL | Age: 85
End: 2025-01-23
Payer: MEDICARE

## 2025-01-23 DIAGNOSIS — R91.1 NODULE OF UPPER LOBE OF RIGHT LUNG: Primary | ICD-10-CM

## 2025-01-23 NOTE — PROGRESS NOTES
01/29/25 0001   Pre-Procedure Phone Call   Procedure Time Verified Yes   Arrival Time 0830   Procedure Location Verified Yes   Medical History Reviewed No   NPO Status Reinforced Yes   Ride and Caregiver Arranged Yes   Phone Number for Ride/Caregiver hold aspirin x 5 days prior to procedure. Pt voiced understanding.   Patient Knows to Bring Current Medications No   Bring Outside Films Requested No

## 2025-01-29 ENCOUNTER — HOSPITAL ENCOUNTER (OUTPATIENT)
Dept: CT IMAGING | Facility: HOSPITAL | Age: 85
Discharge: HOME | End: 2025-01-29
Payer: MEDICARE

## 2025-01-29 ENCOUNTER — HOSPITAL ENCOUNTER (OUTPATIENT)
Dept: GENERAL RADIOLOGY | Facility: HOSPITAL | Age: 85
Discharge: HOME | End: 2025-01-29
Payer: MEDICARE

## 2025-01-29 VITALS
SYSTOLIC BLOOD PRESSURE: 133 MMHG | WEIGHT: 220 LBS | OXYGEN SATURATION: 99 % | HEART RATE: 66 BPM | TEMPERATURE: 97.3 F | HEIGHT: 70 IN | DIASTOLIC BLOOD PRESSURE: 78 MMHG | RESPIRATION RATE: 12 BRPM | BODY MASS INDEX: 31.5 KG/M2

## 2025-01-29 DIAGNOSIS — R91.1 NODULE OF UPPER LOBE OF RIGHT LUNG: ICD-10-CM

## 2025-01-29 LAB
INR PPP: 1.1 (ref 0.8–1.2)
PLATELET # BLD AUTO: 219 10*3/MM3 (ref 140–450)
PROTHROMBIN TIME: 11.6 SECONDS (ref 12.8–15.2)

## 2025-01-29 PROCEDURE — 85049 AUTOMATED PLATELET COUNT: CPT | Performed by: RADIOLOGY

## 2025-01-29 PROCEDURE — 25010000002 FENTANYL CITRATE (PF) 50 MCG/ML SOLUTION: Performed by: RADIOLOGY

## 2025-01-29 PROCEDURE — 88342 IMHCHEM/IMCYTCHM 1ST ANTB: CPT | Performed by: INTERNAL MEDICINE

## 2025-01-29 PROCEDURE — 85610 PROTHROMBIN TIME: CPT

## 2025-01-29 PROCEDURE — 71045 X-RAY EXAM CHEST 1 VIEW: CPT

## 2025-01-29 PROCEDURE — 99152 MOD SED SAME PHYS/QHP 5/>YRS: CPT

## 2025-01-29 PROCEDURE — 25010000002 LIDOCAINE 1 % SOLUTION: Performed by: INTERNAL MEDICINE

## 2025-01-29 PROCEDURE — 88341 IMHCHEM/IMCYTCHM EA ADD ANTB: CPT | Performed by: INTERNAL MEDICINE

## 2025-01-29 PROCEDURE — 25010000002 MIDAZOLAM PER 1 MG: Performed by: RADIOLOGY

## 2025-01-29 PROCEDURE — 88305 TISSUE EXAM BY PATHOLOGIST: CPT | Performed by: INTERNAL MEDICINE

## 2025-01-29 RX ORDER — SODIUM CHLORIDE 0.9 % (FLUSH) 0.9 %
3 SYRINGE (ML) INJECTION EVERY 12 HOURS SCHEDULED
Status: DISCONTINUED | OUTPATIENT
Start: 2025-01-29 | End: 2025-01-30 | Stop reason: HOSPADM

## 2025-01-29 RX ORDER — FENTANYL CITRATE 50 UG/ML
INJECTION, SOLUTION INTRAMUSCULAR; INTRAVENOUS AS NEEDED
Status: COMPLETED | OUTPATIENT
Start: 2025-01-29 | End: 2025-01-29

## 2025-01-29 RX ORDER — LIDOCAINE HYDROCHLORIDE 10 MG/ML
20 INJECTION, SOLUTION INFILTRATION; PERINEURAL ONCE
Status: COMPLETED | OUTPATIENT
Start: 2025-01-29 | End: 2025-01-29

## 2025-01-29 RX ORDER — MIDAZOLAM HYDROCHLORIDE 1 MG/ML
INJECTION, SOLUTION INTRAMUSCULAR; INTRAVENOUS AS NEEDED
Status: COMPLETED | OUTPATIENT
Start: 2025-01-29 | End: 2025-01-29

## 2025-01-29 RX ORDER — SODIUM CHLORIDE 9 MG/ML
40 INJECTION, SOLUTION INTRAVENOUS AS NEEDED
Status: DISCONTINUED | OUTPATIENT
Start: 2025-01-29 | End: 2025-01-30 | Stop reason: HOSPADM

## 2025-01-29 RX ORDER — SODIUM CHLORIDE 0.9 % (FLUSH) 0.9 %
10 SYRINGE (ML) INJECTION AS NEEDED
Status: DISCONTINUED | OUTPATIENT
Start: 2025-01-29 | End: 2025-01-30 | Stop reason: HOSPADM

## 2025-01-29 RX ORDER — SODIUM CHLORIDE 9 MG/ML
25 INJECTION, SOLUTION INTRAVENOUS ONCE
Status: DISCONTINUED | OUTPATIENT
Start: 2025-01-29 | End: 2025-01-30 | Stop reason: HOSPADM

## 2025-01-29 RX ADMIN — MIDAZOLAM 0.5 MG: 1 INJECTION INTRAMUSCULAR; INTRAVENOUS at 09:55

## 2025-01-29 RX ADMIN — LIDOCAINE HYDROCHLORIDE 20 ML: 10 INJECTION, SOLUTION INFILTRATION; PERINEURAL at 09:53

## 2025-01-29 RX ADMIN — FENTANYL CITRATE 50 MCG: 50 INJECTION INTRAMUSCULAR; INTRAVENOUS at 09:50

## 2025-01-29 RX ADMIN — MIDAZOLAM 1 MG: 1 INJECTION INTRAMUSCULAR; INTRAVENOUS at 09:50

## 2025-01-29 RX ADMIN — FENTANYL CITRATE 25 MCG: 50 INJECTION INTRAMUSCULAR; INTRAVENOUS at 09:55

## 2025-01-29 NOTE — POST-PROCEDURE NOTE
POST PROCEDURE NOTE    Procedure: lung bx    Pre-Procedure Diagnosis: nodule    Post-procedure Diagnosis: same    Findings: successful bx, bloodpatch    Complications: none    Blood loss: min    Specimen Removed: 2 cores    Disposition:   Discharge home

## 2025-01-29 NOTE — DISCHARGE INSTRUCTIONS
EDUCATION /DISCHARGE INSTRUCTIONS  CT/US guided biopsy:  A biopsy is a procedure done to remove tissue for further analysis.  Before images are taken to locate the target area.  Images can be obtained using ultrasound, CT or MRI.  A physician will clean your skin with antiseptic soap, place a sterile towel around the site and administer a local anesthetic to numb the area.  The physician will then insert a special needle.  Sometimes images are taken of the needle after it is inserted to ensure the needle is in the correct area to be biopsied.   A sample is obtained and sent to the laboratory for study.  Occasionally the laboratory is unable to make a diagnosis from the sample and the procedure may need to be repeated.  Within a week the radiologist will send a report to your physician.  A pathologist will also examine the tissue and send a report.    Risks of the procedure include but are not limited to:   *  Bleeding    *  Infection   *  Puncture of surrounding organs *  Death     *  Lung collapse if the biopsy is near the chest which may require insertion of a      chest tube to re-inflate the lung if severe.    Benefits of the procedure:  Using x-ray helps to locate the area that requires a biopsy. The procedure is less invasive than a surgical procedure, there are no large incisions and it does not require anesthesia.    Alternatives to the procedure:  A biopsy can be performed surgically.  Risks of a surgical biopsy include exposure to anesthesia, infection, excessive bleeding and injury to abdominal organs.  A benefit of surgical biopsy is the ability to see the area to be biopsied and remove of a larger piece of tissue.    THIS EDUCATION INFORMATION WAS REVIEWED PRIOR TO PROCEDURE AND CONSENT.     Post Procedure:    *  Expect the biopsy site may be tender up to one week.    *  Rest today (no pushing pulling or straining).   *  Slowly increase activity tomorrow.    *  If you received sedation do not drive for  24 hours.   *  Keep dressing clean and dry.   *  Leave dressing on puncture site for 24 hours.    *  You may shower when dressing removed.  Call your doctor if experiencing:   *  Signs of infection such as redness, swelling, excessive pain and / or foul        smelling drainage from the puncture site.   *  Chills or fever over 101 degrees (by mouth).   *  Unrelieved pain.   *  Any new or severe symptoms.   *  If experiencing sudden / severe shortness of breath or chest pain go to the       nearest emergency room.   Following the procedure:     Follow-up with the ordering physician as directed.    Continue to take other medications as directed by your physician unless    otherwise instructed.   If applicable, resume taking your blood thinners or Aspirin on ____Thursday 1/30/25_______.    If you have any concerns please call the Radiology Nurses Desk at [phone].  You are the most important factor in your recovery.  Follow the above instructions carefully.

## 2025-01-29 NOTE — H&P
Name: Steve Mace ADMIT: 2025   : 1940  PCP: Deanne Islas MD    MRN: [id number] LOS: 0 days   AGE/SEX: 84 y.o. male  ROOM: Room/bed info not found     Chief complaint   Patient is a 84 y.o. male presents with lung nodule.     History of Present Illness: nodule    Past Surgical History:  Past Surgical History:   Procedure Laterality Date    COLONOSCOPY      PROSTATECTOMY      TOTAL KNEE ARTHROPLASTY Right 10/22/2021    Procedure: TOTAL KNEE ARTHROPLASTY;  Surgeon: Iban Corrales MD;  Location: [facility] OR;  Service: Orthopedics;  Laterality: Right;       Past Medical History:  Past Medical History:   Diagnosis Date    Acute myopericarditis     Arthritis     OSTEOARTHRITIS    Chronic renal insufficiency     COPD (chronic obstructive pulmonary disease)     Coronary artery disease     Diabetes     Hyperlipidemia     Hypertension     Knee pain, right     Knee swelling     HAVE HAD 3 CORTISONE SHOTS    Lumbosacral disc disease 'S    Mitral valve prolapse     Prostate cancer     Sleep apnea     CPAP-SOME USE, PROVIDED EDUCATION ON SAFE SURGERY SALE SLEEP    Thoracic aortic aneurysm without rupture        Home Medications:  (Not in a hospital admission)      Allergies:  Patient has no known allergies.    Family History:  Family History   Problem Relation Age of Onset    Heart attack Father     Diabetes Father     Heart disease Brother     Heart attack Brother     Diabetes Other     Diabetes Maternal Grandfather     Malig Hyperthermia Neg Hx        Social History:  Social History     Tobacco Use    Smoking status: Former     Current packs/day: 0.00     Average packs/day: 1 pack/day for 20.0 years (20.0 ttl pk-yrs)     Types: Cigarettes     Start date: 10/18/1976     Quit date: 10/18/1996     Years since quittin.3    Smokeless tobacco: Never   Vaping Use    Vaping status: Never Used   Substance Use Topics    Alcohol use: Yes     Comment: FEW DRINKS A NIGHT  caffeine- coffee  daily    Drug use: No        Objective     Physical Exam:   rrr, nml resp effort    Vital Signs  Temp:  [97.3 °F (36.3 °C)] 97.3 °F (36.3 °C)  Heart Rate:  [68] 68  Resp:  [16] 16  BP: (135)/(76) 135/76    Anticipated Surgical Procedure:  Lung bx    The risks, benefits and alternatives of this procedure have been discussed with the patient or responsible party: Yes      Luis Alberto Buitrago MD  01/29/25  09:29 EST

## 2025-01-30 ENCOUNTER — TRANSCRIBE ORDERS (OUTPATIENT)
Dept: ADMINISTRATIVE | Facility: HOSPITAL | Age: 85
End: 2025-01-30
Payer: MEDICARE

## 2025-01-30 DIAGNOSIS — R91.1 LUNG NODULE: Primary | ICD-10-CM

## 2025-02-03 ENCOUNTER — PATIENT OUTREACH (OUTPATIENT)
Dept: OTHER | Facility: HOSPITAL | Age: 85
End: 2025-02-03
Payer: MEDICARE

## 2025-02-03 DIAGNOSIS — R91.1 LUNG NODULE: Primary | ICD-10-CM

## 2025-02-03 LAB
CYTO UR: NORMAL
LAB AP CASE REPORT: NORMAL
LAB AP CLINICAL INFORMATION: NORMAL
LAB AP DIAGNOSIS COMMENT: NORMAL
LAB AP SPECIAL STAINS: NORMAL
PATH REPORT.ADDENDUM SPEC: NORMAL
PATH REPORT.FINAL DX SPEC: NORMAL
PATH REPORT.GROSS SPEC: NORMAL

## 2025-02-03 NOTE — PROGRESS NOTES
Referral received from Dr. Gastelum.  I called patient, introduced myself and explained navigational services.     We discussed his work up thus far including his RUL CT guided biopsy performed 1/29/25.  Dr. Rinaldi/NADIRA informed the patient of his biopsy results. We discussed the PET scan dietary restrictions/instructions. Patient verbalized understanding. We also discussed what to expect at his upcoming [facility] appt. All questions answered.    The patient is alert and oriented. He ambulates without assistive devices, denies falls and is independent with ADLs. The patient lives with his wife in [place], [place].     The patient is a former 1 ppd smoker from [phone]. The patient reports Agent Orange exposure.     The patient has [employer]placement. He denies any current BHE claim issues. We discussed financial navigation, cost estimates and possible financial assistance if needed in the future.     The patient denies transportation, financial or other resource needs at this time.     The patient has been eating well and denies weight loss. The patient has diabetes and takes Metformin.     The patient has a PMH of prostate cancer, [facility] (deemed related to Agent Orange Exposure) and skin cancer which has been removed (he is unclear of the type).  He denies family history of cancer.     The patient is processing through his new cancer diagnosis.  We discussed OSW and Behavioral oncology services.  Patient expressed gratitude for my support and denied any additional needs at this time.     We discussed integrative therapies and other services at the [facility].  Will provide a navigation folder with the following information at his appointment 2/6/25: [facility], Cancer and Restorative Exercise - CARE, [facility] exercise program, Living with a Diagnosis of Lung Cancer, Lung Cancer [place] [facility], Cancer [facility],  Message Therapy, Reiki Therapy, [facility], Cancer Nutrition, Smoking Cessation and Survivorship Clinic.      Navigation will continue to follow; provided my name and number and encouraged patient to call if any needs arise.

## 2025-02-05 ENCOUNTER — HOSPITAL ENCOUNTER (OUTPATIENT)
Dept: PET IMAGING | Facility: HOSPITAL | Age: 85
Discharge: HOME | End: 2025-02-05
Payer: MEDICARE

## 2025-02-05 ENCOUNTER — PATIENT OUTREACH (OUTPATIENT)
Dept: OTHER | Facility: HOSPITAL | Age: 85
End: 2025-02-05
Payer: MEDICARE

## 2025-02-05 DIAGNOSIS — R91.1 LUNG NODULE: ICD-10-CM

## 2025-02-05 LAB — GLUCOSE BLDC GLUCOMTR-MCNC: 116 MG/DL (ref 70–130)

## 2025-02-05 PROCEDURE — 78815 PET IMAGE W/CT SKULL-THIGH: CPT

## 2025-02-05 PROCEDURE — 34310000005 FLUDEOXYGLUCOSE F18 SOLUTION: Performed by: INTERNAL MEDICINE

## 2025-02-05 PROCEDURE — A9552 F18 FDG: HCPCS | Performed by: INTERNAL MEDICINE

## 2025-02-05 PROCEDURE — 82948 REAGENT STRIP/BLOOD GLUCOSE: CPT

## 2025-02-05 RX ADMIN — FLUDEOXYGLUCOSE F 18 1 DOSE: 200 INJECTION, SOLUTION INTRAVENOUS at 13:39

## 2025-02-06 ENCOUNTER — PATIENT OUTREACH (OUTPATIENT)
Dept: OTHER | Facility: HOSPITAL | Age: 85
End: 2025-02-06
Payer: MEDICARE

## 2025-02-06 ENCOUNTER — OFFICE VISIT (OUTPATIENT)
Dept: OTHER | Facility: HOSPITAL | Age: 85
End: 2025-02-06
Payer: MEDICARE

## 2025-02-06 ENCOUNTER — PREP FOR SURGERY (OUTPATIENT)
Dept: OTHER | Facility: HOSPITAL | Age: 85
End: 2025-02-06
Payer: MEDICARE

## 2025-02-06 VITALS
HEART RATE: 74 BPM | DIASTOLIC BLOOD PRESSURE: 81 MMHG | OXYGEN SATURATION: 96 % | WEIGHT: 216.7 LBS | SYSTOLIC BLOOD PRESSURE: 132 MMHG | BODY MASS INDEX: 31.09 KG/M2

## 2025-02-06 DIAGNOSIS — C34.91 NSCLC OF RIGHT LUNG: Primary | ICD-10-CM

## 2025-02-06 PROCEDURE — 1159F MED LIST DOCD IN RCRD: CPT | Performed by: THORACIC SURGERY (CARDIOTHORACIC VASCULAR SURGERY)

## 2025-02-06 PROCEDURE — 3075F SYST BP GE 130 - 139MM HG: CPT | Performed by: THORACIC SURGERY (CARDIOTHORACIC VASCULAR SURGERY)

## 2025-02-06 PROCEDURE — 3079F DIAST BP 80-89 MM HG: CPT | Performed by: THORACIC SURGERY (CARDIOTHORACIC VASCULAR SURGERY)

## 2025-02-06 PROCEDURE — G0463 HOSPITAL OUTPT CLINIC VISIT: HCPCS

## 2025-02-06 PROCEDURE — 1160F RVW MEDS BY RX/DR IN RCRD: CPT | Performed by: THORACIC SURGERY (CARDIOTHORACIC VASCULAR SURGERY)

## 2025-02-06 RX ORDER — SODIUM CHLORIDE 0.9 % (FLUSH) 0.9 %
3 SYRINGE (ML) INJECTION EVERY 12 HOURS SCHEDULED
OUTPATIENT
Start: 2025-02-06

## 2025-02-06 RX ORDER — GABAPENTIN 300 MG/1
300 CAPSULE ORAL ONCE
OUTPATIENT
Start: 2025-02-06 | End: 2025-02-06

## 2025-02-06 RX ORDER — SODIUM CHLORIDE 0.9 % (FLUSH) 0.9 %
3-10 SYRINGE (ML) INJECTION AS NEEDED
OUTPATIENT
Start: 2025-02-06

## 2025-02-06 RX ORDER — HEPARIN SODIUM 5000 [USP'U]/ML
5000 INJECTION, SOLUTION INTRAVENOUS; SUBCUTANEOUS ONCE
OUTPATIENT
Start: 2025-02-06 | End: 2025-02-06

## 2025-02-06 RX ORDER — ACETAMINOPHEN 500 MG
1000 TABLET ORAL ONCE
OUTPATIENT
Start: 2025-02-06 | End: 2025-02-06

## 2025-02-06 RX ORDER — CELECOXIB 200 MG/1
200 CAPSULE ORAL ONCE
OUTPATIENT
Start: 2025-02-06 | End: 2025-02-06

## 2025-02-06 RX ORDER — SODIUM CHLORIDE 9 MG/ML
40 INJECTION, SOLUTION INTRAVENOUS AS NEEDED
OUTPATIENT
Start: 2025-02-06

## 2025-02-06 NOTE — LETTER
"February 27, 2025     Deanne Islas MD  [address]  [address]  [place] [id number]    Patient: Steve Mace   YOB: 1940   Date of Visit: 2/6/2025     Dear Deanne Islas MD:       Thank you for referring Steve Mace to me for evaluation. Below are the relevant portions of my assessment and plan of care.    If you have questions, please do not hesitate to call me. I look forward to following Steve along with you.         Sincerely,        Alma Gastelum MD        CC: No Recipients    Alma Gastelum MD  02/27/25 1726  Sign when Signing Visit  Chief Complaint  Non-small cell lung cancer    Subjective       Steve Mace presents to [facility]  History of Present Illness  Steve Mace is a pleasant 84-year-old gentleman who presents in consultation for newly diagnosed adenocarcinoma of the right upper lobe.  He has a significant smoking history and quit in 1996.  He has a personal history of prostate cancer and nonmelanoma skin cancer.  He has no family history of cancer.  Objective  Vital Signs:  /81   Pulse 74   Wt 98.3 kg (216 lb 11.2 oz)   SpO2 96%   BMI 31.09 kg/m²   Estimated body mass index is 31.09 kg/m² as calculated from the following:    Height as of 1/29/25: 177.8 cm (70\").    Weight as of this encounter: 98.3 kg (216 lb 11.2 oz).          Physical Exam  Vitals and nursing note reviewed.   Constitutional:       Appearance: He is well-developed.   HENT:      Head: Normocephalic and atraumatic.      Nose: Nose normal.   Eyes:      Conjunctiva/sclera: Conjunctivae normal.   Pulmonary:      Effort: Pulmonary effort is normal.   Musculoskeletal:         General: Normal range of motion.      Cervical back: Normal range of motion and neck supple.   Skin:     General: Skin is warm and dry.   Neurological:      Mental Status: He is alert and oriented to person, place, and time.   Psychiatric:         Behavior: Behavior " normal.         Thought Content: Thought content normal.         Judgment: Judgment normal.        Result Review:          I have independently reviewed the CT of the chest performed on 1/16/2025 which demonstrates a right upper lobe nodule measuring 10 x 5 mm that has doubled in size since the prior imaging.  No significant mediastinal or hilar lymphadenopathy.  Stable 5 mm right middle lobe nodule.  Atelectasis along the left lower lobe that is increased from prior.    I have independently reviewed the PET CT scan performed on 2/5/2025 and agree with radiology report which demonstrates the 1 cm nodule in the right upper lobe with an SUV of 2.  No other evidence of metastatic disease.    Pulmonary function studies demonstrate an FEV1 of 2.75 or 102% predicted and a DLCO of 24 or 85% predicted    Stress test from 2022 consider low risk study     Assessment and Plan   Diagnoses and all orders for this visit:    1. NSCLC of right lung [C34.91] (Primary)    Steve Mace is a pleasant 84-year-old gentleman with a newly diagnosed T1 a N0 M0 adenocarcinoma the right upper lobe located in the periphery.  He would be a candidate for curative intent wedge resection with lymph node dissection.  This was discussed with him today and we will plan to get him on the schedule for a wedge resection with lymph node dissection in the near future.  Risks and benefits of this procedure were discussed with the patient today including pneumonia, prolonged air leak and atrial fibrillation.           Follow Up   No follow-ups on file.  Patient was given instructions and counseling regarding his condition or for health maintenance advice. Please see specific information pulled into the AVS if appropriate.

## 2025-02-06 NOTE — PROGRESS NOTES
I accompanied patient and family to Dr. Gastelum's [facility] appt today.  Reintroduced myself.    Dr. Gastelum reviewed the patient's 7/10/24 Ct scan, 1/29/25 RUL CT guided biopsy and 2/5/25 PET scan results. Dr. Gastelum discussed local therapy to treat his presumed stage 1 lung cancer; the patient is interested in pursuing surgical resection (wedge resection with possible lobectomy).  The patient had PFTs 5/2024 and stress testing 6/2022. Results were adequate for resection.  Dr. Gastelum's office will call to schedule PAT and surgery.  Dr. Gastelum answered all patient/family questions.  The patient has Mohs procedure several months ago; will request pathology results.    We discussed what to expect following surgery. All questions answered.     Provided navigation folder. Navigation will continue to follow.  Encouraged patient/family to call as needed.    Contacted Dr. Lawler's office [phone]. Requested path results from recent Mohs procedure. Left my call back and Cancer Center number.

## 2025-02-20 ENCOUNTER — PATIENT OUTREACH (OUTPATIENT)
Dept: OTHER | Facility: HOSPITAL | Age: 85
End: 2025-02-20
Payer: MEDICARE

## 2025-02-20 NOTE — PROGRESS NOTES
Reviewed chart. Patient with presumed RUL stage 1 lung cancer.  PAT 2/28, Surgery (wedge with possible lobectomy) 3/7/25    Called patient.  We discussed what to expect at his PAT appt and upcoming surgery.  He stated he had not seen the pre-op instructions. He will check MyChart and contact Dr. Gastelum's office if he can't access them. Provided her office number.  Explained patient will be seen in her office post op; I will meet him at that appt.     The patient denies any questions/concerns or ongoing resource needs. Navigation will continue to follow; encouraged patient to call as needed.

## 2025-02-27 NOTE — H&P (VIEW-ONLY)
"Chief Complaint  Non-small cell lung cancer    Subjective        Steve Mace presents to [facility]  History of Present Illness  Steve Mace is a pleasant 84-year-old gentleman who presents in consultation for newly diagnosed adenocarcinoma of the right upper lobe.  He has a significant smoking history and quit in 1996.  He has a personal history of prostate cancer and nonmelanoma skin cancer.  He has no family history of cancer.  Objective   Vital Signs:  /81   Pulse 74   Wt 98.3 kg (216 lb 11.2 oz)   SpO2 96%   BMI 31.09 kg/m²   Estimated body mass index is 31.09 kg/m² as calculated from the following:    Height as of 1/29/25: 177.8 cm (70\").    Weight as of this encounter: 98.3 kg (216 lb 11.2 oz).          Physical Exam  Vitals and nursing note reviewed.   Constitutional:       Appearance: He is well-developed.   HENT:      Head: Normocephalic and atraumatic.      Nose: Nose normal.   Eyes:      Conjunctiva/sclera: Conjunctivae normal.   Pulmonary:      Effort: Pulmonary effort is normal.   Musculoskeletal:         General: Normal range of motion.      Cervical back: Normal range of motion and neck supple.   Skin:     General: Skin is warm and dry.   Neurological:      Mental Status: He is alert and oriented to person, place, and time.   Psychiatric:         Behavior: Behavior normal.         Thought Content: Thought content normal.         Judgment: Judgment normal.        Result Review :          I have independently reviewed the CT of the chest performed on 1/16/2025 which demonstrates a right upper lobe nodule measuring 10 x 5 mm that has doubled in size since the prior imaging.  No significant mediastinal or hilar lymphadenopathy.  Stable 5 mm right middle lobe nodule.  Atelectasis along the left lower lobe that is increased from prior.    I have independently reviewed the PET CT scan performed on 2/5/2025 and agree with radiology report which " demonstrates the 1 cm nodule in the right upper lobe with an SUV of 2.  No other evidence of metastatic disease.    Pulmonary function studies demonstrate an FEV1 of 2.75 or 102% predicted and a DLCO of 24 or 85% predicted    Stress test from 2022 consider low risk study     Assessment and Plan   Diagnoses and all orders for this visit:    1. NSCLC of right lung [C34.91] (Primary)    Steve Mace is a pleasant 84-year-old gentleman with a newly diagnosed T1 a N0 M0 adenocarcinoma the right upper lobe located in the periphery.  He would be a candidate for curative intent wedge resection with lymph node dissection.  This was discussed with him today and we will plan to get him on the schedule for a wedge resection with lymph node dissection in the near future.  Risks and benefits of this procedure were discussed with the patient today including pneumonia, prolonged air leak and atrial fibrillation.           Follow Up   No follow-ups on file.  Patient was given instructions and counseling regarding his condition or for health maintenance advice. Please see specific information pulled into the AVS if appropriate.

## 2025-02-27 NOTE — PROGRESS NOTES
"Chief Complaint  Non-small cell lung cancer    Subjective        Steve Mace presents to [facility]  History of Present Illness  Steve Mace is a pleasant 84-year-old gentleman who presents in consultation for newly diagnosed adenocarcinoma of the right upper lobe.  He has a significant smoking history and quit in 1996.  He has a personal history of prostate cancer and nonmelanoma skin cancer.  He has no family history of cancer.  Objective   Vital Signs:  /81   Pulse 74   Wt 98.3 kg (216 lb 11.2 oz)   SpO2 96%   BMI 31.09 kg/m²   Estimated body mass index is 31.09 kg/m² as calculated from the following:    Height as of 1/29/25: 177.8 cm (70\").    Weight as of this encounter: 98.3 kg (216 lb 11.2 oz).          Physical Exam  Vitals and nursing note reviewed.   Constitutional:       Appearance: He is well-developed.   HENT:      Head: Normocephalic and atraumatic.      Nose: Nose normal.   Eyes:      Conjunctiva/sclera: Conjunctivae normal.   Pulmonary:      Effort: Pulmonary effort is normal.   Musculoskeletal:         General: Normal range of motion.      Cervical back: Normal range of motion and neck supple.   Skin:     General: Skin is warm and dry.   Neurological:      Mental Status: He is alert and oriented to person, place, and time.   Psychiatric:         Behavior: Behavior normal.         Thought Content: Thought content normal.         Judgment: Judgment normal.        Result Review :          I have independently reviewed the CT of the chest performed on 1/16/2025 which demonstrates a right upper lobe nodule measuring 10 x 5 mm that has doubled in size since the prior imaging.  No significant mediastinal or hilar lymphadenopathy.  Stable 5 mm right middle lobe nodule.  Atelectasis along the left lower lobe that is increased from prior.    I have independently reviewed the PET CT scan performed on 2/5/2025 and agree with radiology report which " demonstrates the 1 cm nodule in the right upper lobe with an SUV of 2.  No other evidence of metastatic disease.    Pulmonary function studies demonstrate an FEV1 of 2.75 or 102% predicted and a DLCO of 24 or 85% predicted    Stress test from 2022 consider low risk study     Assessment and Plan   Diagnoses and all orders for this visit:    1. NSCLC of right lung [C34.91] (Primary)    Steve Mace is a pleasant 84-year-old gentleman with a newly diagnosed T1 a N0 M0 adenocarcinoma the right upper lobe located in the periphery.  He would be a candidate for curative intent wedge resection with lymph node dissection.  This was discussed with him today and we will plan to get him on the schedule for a wedge resection with lymph node dissection in the near future.  Risks and benefits of this procedure were discussed with the patient today including pneumonia, prolonged air leak and atrial fibrillation.           Follow Up   No follow-ups on file.  Patient was given instructions and counseling regarding his condition or for health maintenance advice. Please see specific information pulled into the AVS if appropriate.

## 2025-02-28 ENCOUNTER — PRE-ADMISSION TESTING (OUTPATIENT)
Dept: PREADMISSION TESTING | Facility: HOSPITAL | Age: 85
End: 2025-02-28
Payer: MEDICARE

## 2025-02-28 VITALS
HEART RATE: 64 BPM | SYSTOLIC BLOOD PRESSURE: 132 MMHG | BODY MASS INDEX: 32.03 KG/M2 | TEMPERATURE: 97.6 F | WEIGHT: 223.7 LBS | OXYGEN SATURATION: 99 % | RESPIRATION RATE: 20 BRPM | DIASTOLIC BLOOD PRESSURE: 77 MMHG | HEIGHT: 70 IN

## 2025-02-28 DIAGNOSIS — C34.91 NSCLC OF RIGHT LUNG: ICD-10-CM

## 2025-02-28 LAB
ABO GROUP BLD: NORMAL
ANION GAP SERPL CALCULATED.3IONS-SCNC: 12.5 MMOL/L (ref 5–15)
BLD GP AB SCN SERPL QL: NEGATIVE
BUN SERPL-MCNC: 22 MG/DL (ref 8–23)
BUN/CREAT SERPL: 16.3 (ref 7–25)
CALCIUM SPEC-SCNC: 9.7 MG/DL (ref 8.6–10.5)
CHLORIDE SERPL-SCNC: 104 MMOL/L (ref 98–107)
CO2 SERPL-SCNC: 21.5 MMOL/L (ref 22–29)
CREAT SERPL-MCNC: 1.35 MG/DL (ref 0.76–1.27)
DEPRECATED RDW RBC AUTO: 45.8 FL (ref 37–54)
EGFRCR SERPLBLD CKD-EPI 2021: 52.4 ML/MIN/1.73
ERYTHROCYTE [DISTWIDTH] IN BLOOD BY AUTOMATED COUNT: 13.6 % (ref 12.3–15.4)
GLUCOSE SERPL-MCNC: 170 MG/DL (ref 65–99)
HCT VFR BLD AUTO: 46.9 % (ref 37.5–51)
HGB BLD-MCNC: 15.7 G/DL (ref 13–17.7)
MCH RBC QN AUTO: 30.5 PG (ref 26.6–33)
MCHC RBC AUTO-ENTMCNC: 33.5 G/DL (ref 31.5–35.7)
MCV RBC AUTO: 91.1 FL (ref 79–97)
PLATELET # BLD AUTO: 211 10*3/MM3 (ref 140–450)
PMV BLD AUTO: 9.4 FL (ref 6–12)
POTASSIUM SERPL-SCNC: 4.4 MMOL/L (ref 3.5–5.2)
RBC # BLD AUTO: 5.15 10*6/MM3 (ref 4.14–5.8)
RH BLD: POSITIVE
SODIUM SERPL-SCNC: 138 MMOL/L (ref 136–145)
T&S EXPIRATION DATE: NORMAL
WBC NRBC COR # BLD AUTO: 5.69 10*3/MM3 (ref 3.4–10.8)

## 2025-02-28 PROCEDURE — 86900 BLOOD TYPING SEROLOGIC ABO: CPT

## 2025-02-28 PROCEDURE — 36415 COLL VENOUS BLD VENIPUNCTURE: CPT

## 2025-02-28 PROCEDURE — 86901 BLOOD TYPING SEROLOGIC RH(D): CPT

## 2025-02-28 PROCEDURE — 80048 BASIC METABOLIC PNL TOTAL CA: CPT

## 2025-02-28 PROCEDURE — 86850 RBC ANTIBODY SCREEN: CPT

## 2025-02-28 PROCEDURE — 85027 COMPLETE CBC AUTOMATED: CPT

## 2025-02-28 NOTE — DISCHARGE INSTRUCTIONS
Take the following medications the morning of surgery:    SYMBICORT, METOPROLOL      If you are on prescription narcotic pain medication to control your pain you may also take that medication the morning of surgery.      General Instructions:     Do not eat solid food after midnight the night before surgery.  Clear liquids day of surgery are allowed but must be stopped at least two hours before your hospital arrival time.       Allowed clear liquids      Water, sodas, and tea or coffee with no cream or milk added.       12 to 20 ounces of a clear liquid that contains carbohydrates is recommended.  If non-diabetic, have Gatorade or Powerade.  If diabetic, have G2 or Powerade Zero.     Do not have liquids red in color.  Do not consume chicken, beef, pork or vegetable broth or bouillon cubes of any variety as they are not considered clear liquids and are not allowed.      Infants may have breast milk up to four hours before surgery.  Infants drinking formula may drink formula up to six hours before surgery.   Patients who avoid smoking, chewing tobacco and alcohol for 4 weeks prior to surgery have a reduced risk of post-operative complications.  Quit smoking as many days before surgery as you can.  Do not smoke, use chewing tobacco or drink alcohol the day of surgery.   If applicable bring your C-PAP/ BI-PAP machine in with you to preop day of surgery.  Bring any papers given to you in the doctor’s office.  Wear clean comfortable clothes.  Do not wear contact lenses, false eyelashes or make-up.  Bring a case for your glasses.   Bring crutches or walker if applicable.  Remove all piercings.  Leave jewelry and any other valuables at home.  Hair extensions with metal clips must be removed prior to surgery.  The Pre-Admission Testing nurse will instruct you to bring medications if unable to obtain an accurate list in Pre-Admission Testing.    Day of surgery you will need to let the preoperative nurse know the last time you  took each of your medications.  To ensure a safe environment for patients and staff, we kindly ask that children under the age of 16 not accompany patients.  If you must bring a dependent child or dependent adult please ensure a responsible adult, other than yourself, is present to supervise them.      If you were given a blood bank ID arm band remember to bring it with you the day of surgery.    Preventing a Surgical Site Infection:  For 2 to 3 days before surgery, avoid shaving with a razor because the razor can irritate skin and make it easier to develop an infection.    Any areas of open skin can increase the risk of a post-operative wound infection by allowing bacteria to enter and travel throughout the body.  Notify your surgeon if you have any skin wounds / rashes even if it is not near the expected surgical site.  The area will need assessed to determine if surgery should be delayed until it is healed.  The night prior to surgery shower using a fresh bar of anti-bacterial soap (such as Dial) and clean washcloth.  Sleep in a clean bed with clean clothing.  Do not allow pets to sleep with you.  Shower on the morning of surgery using a fresh bar of anti-bacterial soap (such as Dial) and clean washcloth.  Dry with a clean towel and dress in clean clothing.  Ask your surgeon if you will be receiving antibiotics prior to surgery.  Make sure you, your family, and all healthcare providers clean their hands with soap and water or an alcohol based hand  before caring for you or your wound.    Day of surgery:  Your arrival time is approximately two hours before your scheduled surgery time.  Please note if you have an early arrival time the surgery doors do not open before 5:00 AM.  Upon arrival, a Pre-op nurse and Anesthesiologist will review your health history, obtain vital signs, and answer questions you may have.  The only belongings needed at this time will be a list of your home medications and if  applicable your C-PAP/BI-PAP machine.  A Pre-op nurse will start an IV and you may receive medication in preparation for surgery, including something to help you relax.     Please be aware that surgery does come with discomfort.  We want to make every effort to control your discomfort so please discuss any uncontrolled symptoms with your nurse.   Your doctor will most likely have prescribed pain medications.      If you are going home after surgery you will receive individualized written care instructions before being discharged.  A responsible adult must drive you to and from the hospital on the day of your surgery and ideally stay with you through the night.   .  Discharge prescriptions can be filled by the hospital pharmacy during regular pharmacy hours.  If you are having surgery late in the day/evening your prescription may be e-prescribed to your pharmacy.  Please verify your pharmacy hours or chose a 24 hour pharmacy to avoid not having access to your prescription because your pharmacy has closed for the day.    If you are staying overnight following surgery, you will be transported to your hospital room following the recovery period.  [facility] has all private rooms.    If you have any questions please call Pre-Admission Testing at [phone].  Deductibles and co-payments are collected on the day of service. Please be prepared to pay the required co-pay, deductible or deposit on the day of service as defined by your plan.    Call your surgeon immediately if you experience any of the following symptoms:  Sore Throat  Shortness of Breath or difficulty breathing  Cough  Chills  Body soreness or muscle pain  Headache  Fever  New loss of taste or smell  Do not arrive for your surgery ill.  Your procedure will need to be rescheduled to another time.  You will need to call your physician before the day of surgery to avoid any unnecessary exposure to hospital staff as well as other patients.       CHLORHEXIDINE CLOTH INSTRUCTIONS  The morning of surgery follow these instructions using the Chlorhexidine cloths you've been given.  These steps reduce bacteria on the body.  Do not use the cloths near your eyes, ears mouth, genitalia or on open wounds.  Throw the cloths away after use but do not try to flush them down a toilet.      Open and remove one cloth at a time from the package.    Leave the cloth unfolded and begin the bathing.  Massage the skin with the cloths using gentle pressure to remove bacteria.  Do not scrub harshly.   Follow the steps below with one 2% CHG cloth per area (6 total cloths).  One cloth for neck, shoulders and chest.  One cloth for both arms, hands, fingers and underarms (do underarms last).  One cloth for the abdomen followed by groin.  One cloth for right leg and foot including between the toes.  One cloth for left leg and foot including between the toes.  The last cloth is to be used for the back of the neck, back and buttocks.    Allow the CHG to air dry 3 minutes on the skin which will give it time to work and decrease the chance of irritation.  The skin may feel sticky until it is dry.  Do not rinse with water or any other liquid or you will lose the beneficial effects of the CHG.  If mild skin irritation occurs, do rinse the skin to remove the CHG.  Report this to the nurse at time of admission.  Do not apply lotions, creams, ointments, deodorants or perfumes after using the clothes. Dress in clean clothes before coming to the hospital.

## 2025-03-05 ENCOUNTER — PATIENT OUTREACH (OUTPATIENT)
Dept: OTHER | Facility: HOSPITAL | Age: 85
End: 2025-03-05
Payer: MEDICARE

## 2025-03-05 NOTE — PROGRESS NOTES
Call from patient regarding adjustment in surgery time. Encouraged him to discuss with Dr. Gastelum's surgery scheduler.  She may be able to provide more details. Provided Ellen's number.

## 2025-03-07 ENCOUNTER — ANESTHESIA (OUTPATIENT)
Dept: PERIOP | Facility: HOSPITAL | Age: 85
End: 2025-03-07
Payer: MEDICARE

## 2025-03-07 ENCOUNTER — APPOINTMENT (OUTPATIENT)
Dept: GENERAL RADIOLOGY | Facility: HOSPITAL | Age: 85
DRG: 165 | End: 2025-03-07
Payer: COMMERCIAL

## 2025-03-07 ENCOUNTER — HOSPITAL ENCOUNTER (INPATIENT)
Facility: HOSPITAL | Age: 85
LOS: 1 days | Discharge: HOME | DRG: 165 | End: 2025-03-08
Attending: THORACIC SURGERY (CARDIOTHORACIC VASCULAR SURGERY) | Admitting: THORACIC SURGERY (CARDIOTHORACIC VASCULAR SURGERY)
Payer: MEDICARE

## 2025-03-07 ENCOUNTER — ANESTHESIA EVENT (OUTPATIENT)
Dept: PERIOP | Facility: HOSPITAL | Age: 85
End: 2025-03-07
Payer: MEDICARE

## 2025-03-07 DIAGNOSIS — C34.91 NSCLC OF RIGHT LUNG: Primary | ICD-10-CM

## 2025-03-07 LAB
GLUCOSE BLDC GLUCOMTR-MCNC: 135 MG/DL (ref 70–130)
GLUCOSE BLDC GLUCOMTR-MCNC: 146 MG/DL (ref 70–130)
GLUCOSE BLDC GLUCOMTR-MCNC: 336 MG/DL (ref 70–130)

## 2025-03-07 PROCEDURE — 32666 THORACOSCOPY W/WEDGE RESECT: CPT | Performed by: SPECIALIST/TECHNOLOGIST, OTHER

## 2025-03-07 PROCEDURE — 82948 REAGENT STRIP/BLOOD GLUCOSE: CPT

## 2025-03-07 PROCEDURE — 88305 TISSUE EXAM BY PATHOLOGIST: CPT | Performed by: THORACIC SURGERY (CARDIOTHORACIC VASCULAR SURGERY)

## 2025-03-07 PROCEDURE — 25010000002 HEPARIN (PORCINE) PER 1000 UNITS: Performed by: THORACIC SURGERY (CARDIOTHORACIC VASCULAR SURGERY)

## 2025-03-07 PROCEDURE — 25010000002 PROPOFOL 10 MG/ML EMULSION: Performed by: REGISTERED NURSE

## 2025-03-07 PROCEDURE — 8E0W4CZ ROBOTIC ASSISTED PROCEDURE OF TRUNK REGION, PERCUTANEOUS ENDOSCOPIC APPROACH: ICD-10-PCS | Performed by: THORACIC SURGERY (CARDIOTHORACIC VASCULAR SURGERY)

## 2025-03-07 PROCEDURE — 63710000001 INSULIN GLARGINE PER 5 UNITS: Performed by: THORACIC SURGERY (CARDIOTHORACIC VASCULAR SURGERY)

## 2025-03-07 PROCEDURE — 25010000002 DEXAMETHASONE SODIUM PHOSPHATE 20 MG/5ML SOLUTION: Performed by: REGISTERED NURSE

## 2025-03-07 PROCEDURE — 25010000002 BUPIVACAINE (PF) 0.25 % SOLUTION 10 ML VIAL: Performed by: THORACIC SURGERY (CARDIOTHORACIC VASCULAR SURGERY)

## 2025-03-07 PROCEDURE — 25810000003 SODIUM CHLORIDE PER 500 ML: Performed by: THORACIC SURGERY (CARDIOTHORACIC VASCULAR SURGERY)

## 2025-03-07 PROCEDURE — 25810000003 LACTATED RINGERS PER 1000 ML: Performed by: ANESTHESIOLOGY

## 2025-03-07 PROCEDURE — 25010000002 SUGAMMADEX 200 MG/2ML SOLUTION: Performed by: REGISTERED NURSE

## 2025-03-07 PROCEDURE — 25010000002 LIDOCAINE 2% SOLUTION: Performed by: REGISTERED NURSE

## 2025-03-07 PROCEDURE — 32674 THORACOSCOPY LYMPH NODE EXC: CPT | Performed by: SPECIALIST/TECHNOLOGIST, OTHER

## 2025-03-07 PROCEDURE — 0BJ08ZZ INSPECTION OF TRACHEOBRONCHIAL TREE, VIA NATURAL OR ARTIFICIAL OPENING ENDOSCOPIC: ICD-10-PCS | Performed by: THORACIC SURGERY (CARDIOTHORACIC VASCULAR SURGERY)

## 2025-03-07 PROCEDURE — 94640 AIRWAY INHALATION TREATMENT: CPT

## 2025-03-07 PROCEDURE — 25010000002 BUPIVACAINE LIPOSOME 1.3 % SUSPENSION 10 ML VIAL: Performed by: THORACIC SURGERY (CARDIOTHORACIC VASCULAR SURGERY)

## 2025-03-07 PROCEDURE — 88331 PATH CONSLTJ SURG 1 BLK 1SPC: CPT | Performed by: THORACIC SURGERY (CARDIOTHORACIC VASCULAR SURGERY)

## 2025-03-07 PROCEDURE — 63710000001 INSULIN LISPRO (HUMAN) PER 5 UNITS: Performed by: THORACIC SURGERY (CARDIOTHORACIC VASCULAR SURGERY)

## 2025-03-07 PROCEDURE — 25010000002 MAGNESIUM SULFATE PER 500 MG OF MAGNESIUM: Performed by: REGISTERED NURSE

## 2025-03-07 PROCEDURE — 25010000002 CEFAZOLIN PER 500 MG: Performed by: THORACIC SURGERY (CARDIOTHORACIC VASCULAR SURGERY)

## 2025-03-07 PROCEDURE — 25010000002 FENTANYL CITRATE (PF) 50 MCG/ML SOLUTION: Performed by: ANESTHESIOLOGY

## 2025-03-07 PROCEDURE — 25810000003 SODIUM CHLORIDE 0.9 % SOLUTION: Performed by: THORACIC SURGERY (CARDIOTHORACIC VASCULAR SURGERY)

## 2025-03-07 PROCEDURE — 0BBC4ZZ EXCISION OF RIGHT UPPER LUNG LOBE, PERCUTANEOUS ENDOSCOPIC APPROACH: ICD-10-PCS | Performed by: THORACIC SURGERY (CARDIOTHORACIC VASCULAR SURGERY)

## 2025-03-07 PROCEDURE — 25010000002 HYDROMORPHONE PER 4 MG: Performed by: REGISTERED NURSE

## 2025-03-07 PROCEDURE — 3E0T3BZ INTRODUCTION OF ANESTHETIC AGENT INTO PERIPHERAL NERVES AND PLEXI, PERCUTANEOUS APPROACH: ICD-10-PCS | Performed by: THORACIC SURGERY (CARDIOTHORACIC VASCULAR SURGERY)

## 2025-03-07 PROCEDURE — 07B74ZX EXCISION OF THORAX LYMPHATIC, PERCUTANEOUS ENDOSCOPIC APPROACH, DIAGNOSTIC: ICD-10-PCS | Performed by: THORACIC SURGERY (CARDIOTHORACIC VASCULAR SURGERY)

## 2025-03-07 PROCEDURE — 25010000002 ONDANSETRON PER 1 MG: Performed by: REGISTERED NURSE

## 2025-03-07 PROCEDURE — 88307 TISSUE EXAM BY PATHOLOGIST: CPT | Performed by: THORACIC SURGERY (CARDIOTHORACIC VASCULAR SURGERY)

## 2025-03-07 PROCEDURE — 25010000002 BUPIVACAINE LIPOSOME 1.3 % SUSPENSION: Performed by: ANESTHESIOLOGY

## 2025-03-07 PROCEDURE — 71045 X-RAY EXAM CHEST 1 VIEW: CPT

## 2025-03-07 PROCEDURE — 25010000002 BUPIVACAINE (PF) 0.25 % SOLUTION: Performed by: ANESTHESIOLOGY

## 2025-03-07 PROCEDURE — 32666 THORACOSCOPY W/WEDGE RESECT: CPT | Performed by: THORACIC SURGERY (CARDIOTHORACIC VASCULAR SURGERY)

## 2025-03-07 PROCEDURE — 88312 SPECIAL STAINS GROUP 1: CPT | Performed by: THORACIC SURGERY (CARDIOTHORACIC VASCULAR SURGERY)

## 2025-03-07 PROCEDURE — 88313 SPECIAL STAINS GROUP 2: CPT | Performed by: THORACIC SURGERY (CARDIOTHORACIC VASCULAR SURGERY)

## 2025-03-07 PROCEDURE — 32674 THORACOSCOPY LYMPH NODE EXC: CPT | Performed by: THORACIC SURGERY (CARDIOTHORACIC VASCULAR SURGERY)

## 2025-03-07 PROCEDURE — 25010000002 MIDAZOLAM PER 1 MG: Performed by: ANESTHESIOLOGY

## 2025-03-07 PROCEDURE — 94799 UNLISTED PULMONARY SVC/PX: CPT

## 2025-03-07 DEVICE — SUREFORM 45 RELOAD GREEN
Type: IMPLANTABLE DEVICE | Site: CHEST | Status: FUNCTIONAL
Brand: SUREFORM

## 2025-03-07 RX ORDER — HEPARIN SODIUM 5000 [USP'U]/ML
5000 INJECTION, SOLUTION INTRAVENOUS; SUBCUTANEOUS ONCE
Status: COMPLETED | OUTPATIENT
Start: 2025-03-07 | End: 2025-03-07

## 2025-03-07 RX ORDER — CELECOXIB 200 MG/1
200 CAPSULE ORAL ONCE
Status: COMPLETED | OUTPATIENT
Start: 2025-03-07 | End: 2025-03-07

## 2025-03-07 RX ORDER — NITROGLYCERIN 0.4 MG/1
0.4 TABLET SUBLINGUAL
Status: DISCONTINUED | OUTPATIENT
Start: 2025-03-07 | End: 2025-03-08 | Stop reason: HOSPADM

## 2025-03-07 RX ORDER — SODIUM CHLORIDE 0.9 % (FLUSH) 0.9 %
3 SYRINGE (ML) INJECTION EVERY 12 HOURS SCHEDULED
Status: DISCONTINUED | OUTPATIENT
Start: 2025-03-07 | End: 2025-03-07 | Stop reason: HOSPADM

## 2025-03-07 RX ORDER — INSULIN LISPRO 100 [IU]/ML
1-200 INJECTION, SOLUTION INTRAVENOUS; SUBCUTANEOUS AS NEEDED
Status: DISCONTINUED | OUTPATIENT
Start: 2025-03-07 | End: 2025-03-08 | Stop reason: HOSPADM

## 2025-03-07 RX ORDER — HYDROMORPHONE HYDROCHLORIDE 1 MG/ML
0.5 INJECTION, SOLUTION INTRAMUSCULAR; INTRAVENOUS; SUBCUTANEOUS
Status: DISCONTINUED | OUTPATIENT
Start: 2025-03-07 | End: 2025-03-08 | Stop reason: HOSPADM

## 2025-03-07 RX ORDER — KETAMINE HCL IN NACL, ISO-OSM 100MG/10ML
SYRINGE (ML) INJECTION AS NEEDED
Status: DISCONTINUED | OUTPATIENT
Start: 2025-03-07 | End: 2025-03-07 | Stop reason: SURG

## 2025-03-07 RX ORDER — OXYCODONE HYDROCHLORIDE 5 MG/1
5 TABLET ORAL EVERY 4 HOURS PRN
Status: DISCONTINUED | OUTPATIENT
Start: 2025-03-07 | End: 2025-03-08 | Stop reason: HOSPADM

## 2025-03-07 RX ORDER — LIDOCAINE HYDROCHLORIDE 40 MG/ML
SOLUTION TOPICAL AS NEEDED
Status: DISCONTINUED | OUTPATIENT
Start: 2025-03-07 | End: 2025-03-07 | Stop reason: SURG

## 2025-03-07 RX ORDER — SODIUM CHLORIDE 0.9 % (FLUSH) 0.9 %
3-10 SYRINGE (ML) INJECTION AS NEEDED
Status: DISCONTINUED | OUTPATIENT
Start: 2025-03-07 | End: 2025-03-07 | Stop reason: HOSPADM

## 2025-03-07 RX ORDER — IPRATROPIUM BROMIDE AND ALBUTEROL SULFATE 2.5; .5 MG/3ML; MG/3ML
3 SOLUTION RESPIRATORY (INHALATION) ONCE AS NEEDED
Status: DISCONTINUED | OUTPATIENT
Start: 2025-03-07 | End: 2025-03-07 | Stop reason: HOSPADM

## 2025-03-07 RX ORDER — DEXAMETHASONE SODIUM PHOSPHATE 4 MG/ML
INJECTION, SOLUTION INTRA-ARTICULAR; INTRALESIONAL; INTRAMUSCULAR; INTRAVENOUS; SOFT TISSUE AS NEEDED
Status: DISCONTINUED | OUTPATIENT
Start: 2025-03-07 | End: 2025-03-07 | Stop reason: SURG

## 2025-03-07 RX ORDER — NICOTINE POLACRILEX 4 MG
15 LOZENGE BUCCAL
Status: DISCONTINUED | OUTPATIENT
Start: 2025-03-07 | End: 2025-03-08 | Stop reason: HOSPADM

## 2025-03-07 RX ORDER — DIPHENHYDRAMINE HYDROCHLORIDE 50 MG/ML
25 INJECTION INTRAMUSCULAR; INTRAVENOUS EVERY 4 HOURS PRN
Status: DISCONTINUED | OUTPATIENT
Start: 2025-03-07 | End: 2025-03-07 | Stop reason: HOSPADM

## 2025-03-07 RX ORDER — HEPARIN SODIUM 5000 [USP'U]/ML
5000 INJECTION, SOLUTION INTRAVENOUS; SUBCUTANEOUS EVERY 8 HOURS SCHEDULED
Status: DISCONTINUED | OUTPATIENT
Start: 2025-03-08 | End: 2025-03-08 | Stop reason: HOSPADM

## 2025-03-07 RX ORDER — KETAMINE HCL IN NACL, ISO-OSM 100MG/10ML
10 SYRINGE (ML) INJECTION
Status: DISCONTINUED | OUTPATIENT
Start: 2025-03-07 | End: 2025-03-07 | Stop reason: HOSPADM

## 2025-03-07 RX ORDER — IBUPROFEN 600 MG/1
1 TABLET ORAL
Status: DISCONTINUED | OUTPATIENT
Start: 2025-03-07 | End: 2025-03-08 | Stop reason: HOSPADM

## 2025-03-07 RX ORDER — IPRATROPIUM BROMIDE AND ALBUTEROL SULFATE 2.5; .5 MG/3ML; MG/3ML
3 SOLUTION RESPIRATORY (INHALATION)
Status: DISCONTINUED | OUTPATIENT
Start: 2025-03-07 | End: 2025-03-08 | Stop reason: HOSPADM

## 2025-03-07 RX ORDER — DIPHENHYDRAMINE HCL 25 MG
25 CAPSULE ORAL EVERY 4 HOURS PRN
Status: DISCONTINUED | OUTPATIENT
Start: 2025-03-07 | End: 2025-03-07 | Stop reason: HOSPADM

## 2025-03-07 RX ORDER — CELECOXIB 100 MG/1
100 CAPSULE ORAL EVERY 12 HOURS SCHEDULED
Status: CANCELLED | OUTPATIENT
Start: 2025-03-07

## 2025-03-07 RX ORDER — ROCURONIUM BROMIDE 10 MG/ML
INJECTION, SOLUTION INTRAVENOUS AS NEEDED
Status: DISCONTINUED | OUTPATIENT
Start: 2025-03-07 | End: 2025-03-07 | Stop reason: SURG

## 2025-03-07 RX ORDER — MAGNESIUM SULFATE HEPTAHYDRATE 500 MG/ML
INJECTION, SOLUTION INTRAMUSCULAR; INTRAVENOUS AS NEEDED
Status: DISCONTINUED | OUTPATIENT
Start: 2025-03-07 | End: 2025-03-07 | Stop reason: SURG

## 2025-03-07 RX ORDER — ACETAMINOPHEN 500 MG
1000 TABLET ORAL ONCE
Status: COMPLETED | OUTPATIENT
Start: 2025-03-07 | End: 2025-03-07

## 2025-03-07 RX ORDER — NALOXONE HCL 0.4 MG/ML
0.4 VIAL (ML) INJECTION
Status: DISCONTINUED | OUTPATIENT
Start: 2025-03-07 | End: 2025-03-07 | Stop reason: HOSPADM

## 2025-03-07 RX ORDER — LABETALOL HYDROCHLORIDE 5 MG/ML
5 INJECTION, SOLUTION INTRAVENOUS
Status: DISCONTINUED | OUTPATIENT
Start: 2025-03-07 | End: 2025-03-07 | Stop reason: HOSPADM

## 2025-03-07 RX ORDER — FLUMAZENIL 0.1 MG/ML
0.2 INJECTION INTRAVENOUS AS NEEDED
Status: DISCONTINUED | OUTPATIENT
Start: 2025-03-07 | End: 2025-03-07 | Stop reason: HOSPADM

## 2025-03-07 RX ORDER — FENTANYL CITRATE 50 UG/ML
50 INJECTION, SOLUTION INTRAMUSCULAR; INTRAVENOUS ONCE AS NEEDED
Status: COMPLETED | OUTPATIENT
Start: 2025-03-07 | End: 2025-03-07

## 2025-03-07 RX ORDER — MIDAZOLAM HYDROCHLORIDE 1 MG/ML
0.5 INJECTION, SOLUTION INTRAMUSCULAR; INTRAVENOUS
Status: DISCONTINUED | OUTPATIENT
Start: 2025-03-07 | End: 2025-03-07 | Stop reason: HOSPADM

## 2025-03-07 RX ORDER — ONDANSETRON 2 MG/ML
INJECTION INTRAMUSCULAR; INTRAVENOUS AS NEEDED
Status: DISCONTINUED | OUTPATIENT
Start: 2025-03-07 | End: 2025-03-07 | Stop reason: SURG

## 2025-03-07 RX ORDER — SODIUM CHLORIDE 9 MG/ML
INJECTION, SOLUTION INTRAVENOUS AS NEEDED
Status: DISCONTINUED | OUTPATIENT
Start: 2025-03-07 | End: 2025-03-07 | Stop reason: HOSPADM

## 2025-03-07 RX ORDER — LIDOCAINE HYDROCHLORIDE 20 MG/ML
INJECTION, SOLUTION INFILTRATION; PERINEURAL AS NEEDED
Status: DISCONTINUED | OUTPATIENT
Start: 2025-03-07 | End: 2025-03-07 | Stop reason: SURG

## 2025-03-07 RX ORDER — LIDOCAINE HYDROCHLORIDE 10 MG/ML
0.5 INJECTION, SOLUTION INFILTRATION; PERINEURAL ONCE AS NEEDED
Status: DISCONTINUED | OUTPATIENT
Start: 2025-03-07 | End: 2025-03-07 | Stop reason: HOSPADM

## 2025-03-07 RX ORDER — PROPOFOL 10 MG/ML
VIAL (ML) INTRAVENOUS AS NEEDED
Status: DISCONTINUED | OUTPATIENT
Start: 2025-03-07 | End: 2025-03-07 | Stop reason: SURG

## 2025-03-07 RX ORDER — METOPROLOL TARTRATE 25 MG/1
25 TABLET, FILM COATED ORAL DAILY
Status: DISCONTINUED | OUTPATIENT
Start: 2025-03-08 | End: 2025-03-08 | Stop reason: HOSPADM

## 2025-03-07 RX ORDER — INSULIN LISPRO 100 [IU]/ML
1-200 INJECTION, SOLUTION INTRAVENOUS; SUBCUTANEOUS
Status: DISCONTINUED | OUTPATIENT
Start: 2025-03-07 | End: 2025-03-08 | Stop reason: HOSPADM

## 2025-03-07 RX ORDER — ONDANSETRON 2 MG/ML
4 INJECTION INTRAMUSCULAR; INTRAVENOUS ONCE AS NEEDED
Status: DISCONTINUED | OUTPATIENT
Start: 2025-03-07 | End: 2025-03-07 | Stop reason: HOSPADM

## 2025-03-07 RX ORDER — GABAPENTIN 100 MG/1
100 CAPSULE ORAL EVERY 8 HOURS SCHEDULED
Status: DISCONTINUED | OUTPATIENT
Start: 2025-03-07 | End: 2025-03-08 | Stop reason: HOSPADM

## 2025-03-07 RX ORDER — SODIUM CHLORIDE 9 MG/ML
40 INJECTION, SOLUTION INTRAVENOUS AS NEEDED
Status: DISCONTINUED | OUTPATIENT
Start: 2025-03-07 | End: 2025-03-07 | Stop reason: HOSPADM

## 2025-03-07 RX ORDER — HYDROMORPHONE HYDROCHLORIDE 1 MG/ML
0.25 INJECTION, SOLUTION INTRAMUSCULAR; INTRAVENOUS; SUBCUTANEOUS
Status: DISCONTINUED | OUTPATIENT
Start: 2025-03-07 | End: 2025-03-07 | Stop reason: HOSPADM

## 2025-03-07 RX ORDER — NALOXONE HCL 0.4 MG/ML
0.4 VIAL (ML) INJECTION AS NEEDED
Status: DISCONTINUED | OUTPATIENT
Start: 2025-03-07 | End: 2025-03-07 | Stop reason: HOSPADM

## 2025-03-07 RX ORDER — SODIUM CHLORIDE 9 MG/ML
75 INJECTION, SOLUTION INTRAVENOUS CONTINUOUS
Status: DISCONTINUED | OUTPATIENT
Start: 2025-03-07 | End: 2025-03-08

## 2025-03-07 RX ORDER — METOCLOPRAMIDE HYDROCHLORIDE 5 MG/ML
10 INJECTION INTRAMUSCULAR; INTRAVENOUS ONCE AS NEEDED
Status: DISCONTINUED | OUTPATIENT
Start: 2025-03-07 | End: 2025-03-07 | Stop reason: HOSPADM

## 2025-03-07 RX ORDER — SODIUM CHLORIDE, SODIUM LACTATE, POTASSIUM CHLORIDE, CALCIUM CHLORIDE 600; 310; 30; 20 MG/100ML; MG/100ML; MG/100ML; MG/100ML
9 INJECTION, SOLUTION INTRAVENOUS CONTINUOUS
Status: ACTIVE | OUTPATIENT
Start: 2025-03-07 | End: 2025-03-08

## 2025-03-07 RX ORDER — GABAPENTIN 300 MG/1
300 CAPSULE ORAL ONCE
Status: COMPLETED | OUTPATIENT
Start: 2025-03-07 | End: 2025-03-07

## 2025-03-07 RX ORDER — GABAPENTIN 100 MG/1
100 CAPSULE ORAL EVERY 8 HOURS SCHEDULED
Status: DISCONTINUED | OUTPATIENT
Start: 2025-03-07 | End: 2025-03-07

## 2025-03-07 RX ORDER — BUDESONIDE AND FORMOTEROL FUMARATE DIHYDRATE 160; 4.5 UG/1; UG/1
2 AEROSOL RESPIRATORY (INHALATION)
Status: DISCONTINUED | OUTPATIENT
Start: 2025-03-07 | End: 2025-03-08 | Stop reason: HOSPADM

## 2025-03-07 RX ORDER — HYDRALAZINE HYDROCHLORIDE 20 MG/ML
5 INJECTION INTRAMUSCULAR; INTRAVENOUS
Status: DISCONTINUED | OUTPATIENT
Start: 2025-03-07 | End: 2025-03-07 | Stop reason: HOSPADM

## 2025-03-07 RX ORDER — BUPIVACAINE HYDROCHLORIDE 2.5 MG/ML
INJECTION, SOLUTION EPIDURAL; INFILTRATION; INTRACAUDAL
Status: COMPLETED | OUTPATIENT
Start: 2025-03-07 | End: 2025-03-07

## 2025-03-07 RX ORDER — ATORVASTATIN CALCIUM 20 MG/1
40 TABLET, FILM COATED ORAL DAILY
Status: DISCONTINUED | OUTPATIENT
Start: 2025-03-08 | End: 2025-03-08 | Stop reason: HOSPADM

## 2025-03-07 RX ORDER — FAMOTIDINE 10 MG/ML
20 INJECTION, SOLUTION INTRAVENOUS ONCE
Status: COMPLETED | OUTPATIENT
Start: 2025-03-07 | End: 2025-03-07

## 2025-03-07 RX ORDER — EPHEDRINE SULFATE 50 MG/ML
INJECTION INTRAVENOUS AS NEEDED
Status: DISCONTINUED | OUTPATIENT
Start: 2025-03-07 | End: 2025-03-07 | Stop reason: SURG

## 2025-03-07 RX ORDER — OXYCODONE HYDROCHLORIDE 5 MG/1
5 TABLET ORAL ONCE AS NEEDED
Status: DISCONTINUED | OUTPATIENT
Start: 2025-03-07 | End: 2025-03-07 | Stop reason: HOSPADM

## 2025-03-07 RX ORDER — DEXTROSE MONOHYDRATE 25 G/50ML
10-50 INJECTION, SOLUTION INTRAVENOUS
Status: DISCONTINUED | OUTPATIENT
Start: 2025-03-07 | End: 2025-03-08 | Stop reason: HOSPADM

## 2025-03-07 RX ADMIN — METFORMIN HYDROCHLORIDE 500 MG: 1000 TABLET ORAL at 21:04

## 2025-03-07 RX ADMIN — MAGNESIUM SULFATE HEPTAHYDRATE 2 G: 500 INJECTION, SOLUTION INTRAMUSCULAR; INTRAVENOUS at 14:53

## 2025-03-07 RX ADMIN — GABAPENTIN 100 MG: 100 CAPSULE ORAL at 21:03

## 2025-03-07 RX ADMIN — ROCURONIUM BROMIDE 50 MG: 10 INJECTION, SOLUTION INTRAVENOUS at 14:29

## 2025-03-07 RX ADMIN — BUPIVACAINE HYDROCHLORIDE 10 ML: 2.5 INJECTION, SOLUTION EPIDURAL; INFILTRATION; INTRACAUDAL; PERINEURAL at 13:34

## 2025-03-07 RX ADMIN — ONDANSETRON 4 MG: 2 INJECTION, SOLUTION INTRAMUSCULAR; INTRAVENOUS at 15:39

## 2025-03-07 RX ADMIN — SUGAMMADEX 200 MG: 100 INJECTION, SOLUTION INTRAVENOUS at 15:45

## 2025-03-07 RX ADMIN — CEFAZOLIN 2000 MG: 2 INJECTION, POWDER, FOR SOLUTION INTRAMUSCULAR; INTRAVENOUS at 14:07

## 2025-03-07 RX ADMIN — ACETAMINOPHEN 1000 MG: 500 TABLET, FILM COATED ORAL at 12:18

## 2025-03-07 RX ADMIN — SODIUM CHLORIDE 75 ML/HR: 900 INJECTION, SOLUTION INTRAVENOUS at 21:04

## 2025-03-07 RX ADMIN — MIDAZOLAM HYDROCHLORIDE 1 MG: 1 INJECTION, SOLUTION INTRAMUSCULAR; INTRAVENOUS at 13:05

## 2025-03-07 RX ADMIN — ROCURONIUM BROMIDE 10 MG: 10 INJECTION, SOLUTION INTRAVENOUS at 14:56

## 2025-03-07 RX ADMIN — BUPIVACAINE 20 ML: 13.3 INJECTION, SUSPENSION, LIPOSOMAL INFILTRATION at 13:34

## 2025-03-07 RX ADMIN — IPRATROPIUM BROMIDE AND ALBUTEROL SULFATE 3 ML: .5; 3 SOLUTION RESPIRATORY (INHALATION) at 22:26

## 2025-03-07 RX ADMIN — Medication 10 MG: at 15:37

## 2025-03-07 RX ADMIN — FENTANYL CITRATE 50 MCG: 50 INJECTION, SOLUTION INTRAMUSCULAR; INTRAVENOUS at 13:05

## 2025-03-07 RX ADMIN — INSULIN LISPRO 6 UNITS: 100 INJECTION, SOLUTION INTRAVENOUS; SUBCUTANEOUS at 21:37

## 2025-03-07 RX ADMIN — Medication 30 MG: at 14:27

## 2025-03-07 RX ADMIN — DEXAMETHASONE SODIUM PHOSPHATE 10 MG: 4 INJECTION, SOLUTION INTRAMUSCULAR; INTRAVENOUS at 14:31

## 2025-03-07 RX ADMIN — PROPOFOL 20 MG: 10 INJECTION, EMULSION INTRAVENOUS at 14:55

## 2025-03-07 RX ADMIN — GABAPENTIN 300 MG: 300 CAPSULE ORAL at 12:18

## 2025-03-07 RX ADMIN — SODIUM CHLORIDE, SODIUM LACTATE, POTASSIUM CHLORIDE, AND CALCIUM CHLORIDE 9 ML/HR: 600; 310; 30; 20 INJECTION, SOLUTION INTRAVENOUS at 13:01

## 2025-03-07 RX ADMIN — LIDOCAINE HYDROCHLORIDE 60 MG: 20 INJECTION, SOLUTION INFILTRATION; PERINEURAL at 14:27

## 2025-03-07 RX ADMIN — EPHEDRINE SULFATE 5 MG: 50 INJECTION INTRAVENOUS at 14:52

## 2025-03-07 RX ADMIN — BUDESONIDE AND FORMOTEROL FUMARATE DIHYDRATE 2 PUFF: 160; 4.5 AEROSOL RESPIRATORY (INHALATION) at 22:26

## 2025-03-07 RX ADMIN — OXYCODONE HYDROCHLORIDE 5 MG: 5 TABLET ORAL at 21:04

## 2025-03-07 RX ADMIN — INSULIN GLARGINE 26 UNITS: 100 INJECTION, SOLUTION SUBCUTANEOUS at 21:36

## 2025-03-07 RX ADMIN — FAMOTIDINE 20 MG: 10 INJECTION INTRAVENOUS at 13:02

## 2025-03-07 RX ADMIN — HYDROMORPHONE HYDROCHLORIDE 0.25 MG: 1 INJECTION, SOLUTION INTRAMUSCULAR; INTRAVENOUS; SUBCUTANEOUS at 16:51

## 2025-03-07 RX ADMIN — Medication 10 MG: at 14:58

## 2025-03-07 RX ADMIN — HEPARIN SODIUM 5000 UNITS: 5000 INJECTION INTRAVENOUS; SUBCUTANEOUS at 13:08

## 2025-03-07 RX ADMIN — LIDOCAINE HYDROCHLORIDE 1 EACH: 40 SOLUTION TOPICAL at 14:31

## 2025-03-07 RX ADMIN — PROPOFOL 150 MG: 10 INJECTION, EMULSION INTRAVENOUS at 14:27

## 2025-03-07 RX ADMIN — CELECOXIB 200 MG: 200 CAPSULE ORAL at 12:18

## 2025-03-07 NOTE — ANESTHESIA PROCEDURE NOTES
Peripheral Block      Patient reassessed immediately prior to procedure    Patient location during procedure: holding area  Reason for block: at surgeon's request and post-op pain management  Performed by  Anesthesiologist: Tc Pickett MD  Preanesthetic Checklist  Completed: patient identified, IV checked, site marked, risks and benefits discussed, surgical consent, monitors and equipment checked, pre-op evaluation and timeout performed  Prep:  Pt Position: supine  Sterile barriers:cap, gloves, mask and sterile barriers  Prep: ChloraPrep  Patient monitoring: blood pressure monitoring, continuous pulse oximetry and EKG  Procedure    Sedation: yes  Performed under: local infiltration  Guidance:ultrasound guided    ULTRASOUND INTERPRETATION.  Using ultrasound guidance a 21 G gauge needle was placed in close proximity to the nerve, at which point, under ultrasound guidance anesthetic was injected in the area of the nerve and spread of the anesthesia was seen on ultrasound in close proximity thereto.  There were no abnormalities seen on ultrasound; a digital image was taken; and the patient tolerated the procedure with no complications. Images:still images obtained    Laterality:right  Block Type:erector spinae block  Injection Technique:single-shot  Needle Type:echogenic  Needle Gauge:21 G  Resistance on Injection: none    Medications Used: bupivacaine liposome (EXPAREL) 1.3 % injection - Infiltration   20 mL - 3/7/2025 1:34:00 PM  bupivacaine PF (MARCAINE) 0.25 % injection - Injection   10 mL - 3/7/2025 1:34:00 PM      Post Assessment  Injection Assessment: negative aspiration for heme, no paresthesia on injection and incremental injection  Patient Tolerance:comfortable throughout block  Complications:no  Additional Notes  Ultrasound interpretation note:  Under ultrasound guidance, needle seen near nerves, local seen spreading around.  No abnormalities noted.  Block for postop pain per surgeon  request.  Performed by: Tc Pickett MD

## 2025-03-07 NOTE — ANESTHESIA PROCEDURE NOTES
Airway  Urgency: elective    Date/Time: 3/7/2025 2:31 PM  Airway not difficult    General Information and Staff    Patient location during procedure: OR  Anesthesiologist: Spencer Jarvis MD  CRNA/CAA: Hansel Johansen CRNA    Indications and Patient Condition  Indications for airway management: airway protection    Preoxygenated: yes  MILS not maintained throughout  Mask difficulty assessment: 1 - vent by mask    Final Airway Details  Final airway type: endotracheal airway      Successful airway: EBT - double lumen left  Cuffed: yes   Successful intubation technique: direct laryngoscopy  Endotracheal tube insertion site: oral  Blade: Cristiano  Blade size: 4  EBT DL size (fr): 39  Cormack-Lehane Classification: grade I - full view of glottis  Placement verified by: bronchoscopy and capnometry   Measured from: teeth  ETT/EBT  to teeth (cm): 22  Number of attempts at approach: 1  Assessment: lips, teeth, and gum same as pre-op and atraumatic intubation

## 2025-03-07 NOTE — ANESTHESIA POSTPROCEDURE EVALUATION
Patient: Steve Mace    Procedure Summary       Date: 03/07/25 Room / Location: [facility] OR [address] [place] [address] MAIN OR    Anesthesia Start: 1416 Anesthesia Stop: 1628    Procedure: THORACOSCOPY WITH DAVINCI ROBOT with right upper lobe wedge resection, Medialstinal and Hylar Lymph Node Dissection, Bronchoscopy, and Intercostal Nerve Block (Right: Chest) Diagnosis:       NSCLC of right lung      (NSCLC of right lung [C34.91])    Surgeons: Alma Gastelum MD Provider: Spencer Jarvis MD    Anesthesia Type: general with blockKaryn ASA Status: 3            Anesthesia Type: general with blockKaryn    Vitals  Vitals Value Taken Time   /71 03/07/25 1800   Temp 36.4 °C (97.5 °F) 03/07/25 1715   Pulse 71 03/07/25 1800   Resp 16 03/07/25 1745   SpO2 97 % 03/07/25 1800   Vitals shown include unfiled device data.        Anesthesia Post Evaluation

## 2025-03-07 NOTE — OP NOTE
THORACOSCOPY AND WEDGE RESECTION WITH DAVINCI ROBOT  Procedure Report    Patient Name:  Steve Mace  YOB: 1940    Date of Surgery:  3/7/2025     Indications: Right upper lobe non-small cell lung cancer, stage Ia    Pre-op Diagnosis:   NSCLC of right lung [C34.91]       Post-Op Diagnosis Codes:     * NSCLC of right lung [C34.91]    Procedure/CPT® Codes:  No CPT Code Applied in Case Entry    Procedure(s):  THORACOSCOPY WITH DAVINCI ROBOT with right upper lobe wedge resection, Medialstinal and Hylar Lymph Node Dissection, Bronchoscopy, and Intercostal Nerve Block    Staff:  Surgeon(s):  Alma Gastelum MD    Assistant: Ford Rudd CSA was responsible for performing the following activities: Retraction, Suction, Closing, Placing Dressing, and Held/Positioned Camera and their skilled assistance was necessary for the success of this case.     Anesthesia: General    Estimated Blood Loss: minimal    Implants:    Implant Name Type Inv. Item Serial No.  Lot No. LRB No. Used Action   RELOAD STPLR SUREFORM 45 DAVINCI/X/XI 6ROW 4.3 GRN 1P/U - [id number] Implant RELOAD STPLR SUREFORM 45 DAVINCI/X/XI 6ROW 4.3 GRN 1P/U  [employer] [id number] Right 5 Implanted       Specimen:          Specimens       ID Source Type Tests Collected By Collected At Frozen?    A Lung, Right Upper Lobe Tissue TISSUE PATHOLOGY EXAM   Alma Gastelum MD 3/7/25 7168 Yes    Description: Right Lung Upper Lobe Wedge Resection    Comment: Frozen Rm 23 #[id number]    B Lymph Node Tissue TISSUE PATHOLOGY EXAM   Alma Gastelum MD 3/7/25 1515     Description: Right Lung Level 8 Lymph Node    Comment: Fresh for Permanent    C Lymph Node Tissue TISSUE PATHOLOGY EXAM   Alma Gastelum MD 3/7/25 1519     Description: Right Lung Level 7 Lymph Node    Comment: Fresh for Permanent    D Lymph Node Tissue TISSUE PATHOLOGY EXAM   Alma Gastelum MD 3/7/25 8708     Description: Right Lung Level 4 Lymph Node    Comment: Fresh  for Permanent    E Lymph Node Tissue TISSUE PATHOLOGY EXAM   Alma Gastelum MD 3/7/25 1528     Description: Right Lung Level 10 Lymph Node    Comment: Fresh for Permanent    F Lymph Node Tissue TISSUE PATHOLOGY EXAM   Alma Gastelum MD 3/7/25 1532     Description: Right Lung Level 11 Lymph Node    Comment: Fresh for Permanent              Findings: Small right upper lobe lung cancer, wedge resection performed.  Cancer confirmed on frozen section with negative margins, greater than a centimeter.    Complications: None apparent      Description of Procedure: Steve Mace was identified in the preoperative holding area and again his consent for the procedure was verified.  The patient was transported to the operating room and placed on the operating room table in supine position.  A general anesthetic was successfully administered and He was intubated with a double lumen endotracheal tube without difficulty.  Placement of the double lumen was confirmed with a video bronchoscope examination. A time out was performed to verify correct patient, correct procedure and the correct administration of IV antibiotics per [facility] protocol.    The patient was placed in left lateral decubitus position, right side up and all pressure points were padded.  The patient was prepped and draped in standard sterile fashion.  Robotic trocars were placed in the seventh intercostal space in standard fashion.  Insufflation was begun.  The robot was brought onto the field and docked in standard fashion.  An assistant port was placed in the 10th intercostal space posterior axillary line.  Instruments were passed into the chest cavity under direct vision.  My assistant remained at the bedside to manipulate and pass instruments and I proceeded to the robotic console.    The nodule was palpated on the surface of the lateral portion of the upper lobe.  A generous wedge resection was performed after the nodule was elevated with 6 sequential firings  of a robotic tissue stapler.  The wedge resection was placed in Endo Catch bag and removed from the chest cavity without difficulty.  This was sent for frozen section which confirmed adenocarcinoma with negative margins.  While awaiting the return of frozen section, the inferior pulmonary ligament was divided and there was no significant inferior pulmonary ligament node.  A paraesophageal node was dissected free and sent for pathologic evaluation.  The subcarinal space was opened and a subcarinal lymph node was retrieved.  The right paratracheal space was opened and a right paratracheal lymph node was retrieved.  I turned my attention to the fissure the patient had a near complete fissure.  The fissural tissue overlying the pulmonary artery was opened using bipolar cautery.  Multiple lymph nodes were taken from around the pulmonary artery and hilum.     A rib block was performed using Marcaine and Exparel.  A 24 Bulgarian chest tube was placed in the 8th intercostal space incision and the lung was reexpanded under direct visualization.     The incisions were closed using deep vicryl sutures and Monocryl for the skin in standard fashion.  The chest tube was secured to the skin.      The patient tolerated this procedure well and was extubated and transported to the recovery room in stable condition.

## 2025-03-07 NOTE — ANESTHESIA PREPROCEDURE EVALUATION
Anesthesia Evaluation                  Airway   Mallampati: II  TM distance: >3 FB  Neck ROM: full  Dental - normal exam     Pulmonary    (+) lung cancer, COPD,sleep apnea on CPAP  (-) decreased breath sounds, wheezes  Cardiovascular - normal exam    (+) hypertension, valvular problems/murmurs, CAD, hyperlipidemia      Neuro/Psych  GI/Hepatic/Renal/Endo    (+) renal disease-, diabetes mellitus    Musculoskeletal     Abdominal    Substance History      OB/GYN          Other   arthritis,   history of cancer                Anesthesia Plan    ASA 3     general with block and Karyn     intravenous induction     Anesthetic plan, risks, benefits, and alternatives have been provided, discussed and informed consent has been obtained with: patient.    CODE STATUS:

## 2025-03-07 NOTE — ANESTHESIA PROCEDURE NOTES
Arterial Line      Patient location during procedure: holding area   Line placed for hemodynamic monitoring.  Performed By   Anesthesiologist: Tc Pickett MD   Preanesthetic Checklist  Completed: patient identified, IV checked, site marked, risks and benefits discussed, surgical consent, monitors and equipment checked, pre-op evaluation and timeout performed  Arterial Line Prep    Sterile Tech: gloves  Prep: ChloraPrep  Patient monitoring: blood pressure monitoring, continuous pulse oximetry and EKG  Arterial Line Procedure   Laterality:left  Location:  radial artery  Catheter size: 20 G   Guidance: palpation technique  Successful placement: yes   Post Assessment   Dressing Type: occlusive dressing applied, secured with tape and wrist guard applied.   Complications no  Circ/Move/Sens Assessment: normal and unchanged.   Patient Tolerance: patient tolerated the procedure well with no apparent complications  Additional Notes  :

## 2025-03-07 NOTE — DISCHARGE INSTRUCTIONS
Post-op VAT / Thoracotomy Discharge Instructions  !!! PLEASE GO TO THE [facility] FOR A CHEST X-RAY PRIOR TO YOUR APPOINTMENT!!!!    1. Activity:  · Climb stairs as tolerated  · May drive car after 2 weeks or as directed by surgeon  · Walk at least 3-4 times a day  · Limit lifting for first 6 weeks. No lifting, pushing, or pulling greater than 20 pounds for at least 2 weeks  · Continue to use your incentive spirometer 10x/hour    2. Nutrition:  · Resume previous diet  · Eat well balanced meals  · Avoid constipation by eating fresh fruits, vegetables, whole grain foods and increasing fluid intake.    3. Incision (wound) Care:  · Remove dressing after 48 hours, then leave open to air  · If continued drainage, change dressing every 24 hours and as needed with dry gauze  · May shower after discharge.  · Wash around incision area with soap and water daily. May also cleanse with hydrogen peroxide and/or betadine'  · No lotions or creams on incision area. It is normal to have some drainage from your chest tube site. Please keep the area clean and dry      4. When to call for Medical Advice: [phone]  · Fever greater than 101 degrees  · Unusual or severe pain  · Difficulty breathing  · Incision changes (redness, swelling, or increased purulent drainage)  · Any questions or problems    5. Medication Instructions:  · Take Pain medications as directed to stay comfortable     -Typically Tylenol, Gabapentin, Celebrex (anti-inflamatory), oxycodone as needed. See discharge teaching sheet  · No driving or drinking alcohol when taking prescribed pain meds  · Laxative of choice if needed for constipation.    6. Follow- up appointment:  · We will arrange a follow up appointment in about 2 weeks   Please go to the [facility] for a chest x-ray prior to your appointment      ** Some bulging to the side/abdomen is normal for up to 3-6 months after surgery as the nerves regenerate and muscles contract

## 2025-03-08 ENCOUNTER — APPOINTMENT (OUTPATIENT)
Dept: GENERAL RADIOLOGY | Facility: HOSPITAL | Age: 85
DRG: 165 | End: 2025-03-08
Payer: MEDICARE

## 2025-03-08 ENCOUNTER — READMISSION MANAGEMENT (OUTPATIENT)
Dept: CALL CENTER | Facility: HOSPITAL | Age: 85
End: 2025-03-08
Payer: MEDICARE

## 2025-03-08 VITALS
HEIGHT: 70 IN | RESPIRATION RATE: 18 BRPM | OXYGEN SATURATION: 100 % | BODY MASS INDEX: 31.88 KG/M2 | HEART RATE: 70 BPM | TEMPERATURE: 98.8 F | SYSTOLIC BLOOD PRESSURE: 103 MMHG | WEIGHT: 222.66 LBS | DIASTOLIC BLOOD PRESSURE: 68 MMHG

## 2025-03-08 LAB
ANION GAP SERPL CALCULATED.3IONS-SCNC: 12.2 MMOL/L (ref 5–15)
BASOPHILS # BLD AUTO: 0.04 10*3/MM3 (ref 0–0.2)
BASOPHILS NFR BLD AUTO: 0.4 % (ref 0–1.5)
BUN SERPL-MCNC: 19 MG/DL (ref 8–23)
BUN/CREAT SERPL: 15.7 (ref 7–25)
CALCIUM SPEC-SCNC: 8.7 MG/DL (ref 8.6–10.5)
CHLORIDE SERPL-SCNC: 102 MMOL/L (ref 98–107)
CO2 SERPL-SCNC: 18.8 MMOL/L (ref 22–29)
CREAT SERPL-MCNC: 1.21 MG/DL (ref 0.76–1.27)
DEPRECATED RDW RBC AUTO: 45 FL (ref 37–54)
EGFRCR SERPLBLD CKD-EPI 2021: 59.8 ML/MIN/1.73
EOSINOPHIL # BLD AUTO: 0 10*3/MM3 (ref 0–0.4)
EOSINOPHIL NFR BLD AUTO: 0 % (ref 0.3–6.2)
ERYTHROCYTE [DISTWIDTH] IN BLOOD BY AUTOMATED COUNT: 13.4 % (ref 12.3–15.4)
GLUCOSE BLDC GLUCOMTR-MCNC: 148 MG/DL (ref 70–130)
GLUCOSE BLDC GLUCOMTR-MCNC: 217 MG/DL (ref 70–130)
GLUCOSE SERPL-MCNC: 209 MG/DL (ref 65–99)
HCT VFR BLD AUTO: 44.5 % (ref 37.5–51)
HGB BLD-MCNC: 14.9 G/DL (ref 13–17.7)
IMM GRANULOCYTES # BLD AUTO: 0.02 10*3/MM3 (ref 0–0.05)
IMM GRANULOCYTES NFR BLD AUTO: 0.2 % (ref 0–0.5)
LYMPHOCYTES # BLD AUTO: 0.57 10*3/MM3 (ref 0.7–3.1)
LYMPHOCYTES NFR BLD AUTO: 5.3 % (ref 19.6–45.3)
MCH RBC QN AUTO: 30.3 PG (ref 26.6–33)
MCHC RBC AUTO-ENTMCNC: 33.5 G/DL (ref 31.5–35.7)
MCV RBC AUTO: 90.4 FL (ref 79–97)
MONOCYTES # BLD AUTO: 0.55 10*3/MM3 (ref 0.1–0.9)
MONOCYTES NFR BLD AUTO: 5.1 % (ref 5–12)
NEUTROPHILS NFR BLD AUTO: 89 % (ref 42.7–76)
NEUTROPHILS NFR BLD AUTO: 9.56 10*3/MM3 (ref 1.7–7)
NRBC BLD AUTO-RTO: 0 /100 WBC (ref 0–0.2)
PLATELET # BLD AUTO: 216 10*3/MM3 (ref 140–450)
PMV BLD AUTO: 9.7 FL (ref 6–12)
POTASSIUM SERPL-SCNC: 4.4 MMOL/L (ref 3.5–5.2)
RBC # BLD AUTO: 4.92 10*6/MM3 (ref 4.14–5.8)
SODIUM SERPL-SCNC: 133 MMOL/L (ref 136–145)
WBC NRBC COR # BLD AUTO: 10.74 10*3/MM3 (ref 3.4–10.8)

## 2025-03-08 PROCEDURE — 94618 PULMONARY STRESS TESTING: CPT

## 2025-03-08 PROCEDURE — 25010000002 HEPARIN (PORCINE) PER 1000 UNITS: Performed by: THORACIC SURGERY (CARDIOTHORACIC VASCULAR SURGERY)

## 2025-03-08 PROCEDURE — 85025 COMPLETE CBC W/AUTO DIFF WBC: CPT | Performed by: THORACIC SURGERY (CARDIOTHORACIC VASCULAR SURGERY)

## 2025-03-08 PROCEDURE — 80048 BASIC METABOLIC PNL TOTAL CA: CPT | Performed by: THORACIC SURGERY (CARDIOTHORACIC VASCULAR SURGERY)

## 2025-03-08 PROCEDURE — 82948 REAGENT STRIP/BLOOD GLUCOSE: CPT

## 2025-03-08 PROCEDURE — 99024 POSTOP FOLLOW-UP VISIT: CPT | Performed by: NURSE PRACTITIONER

## 2025-03-08 PROCEDURE — 71045 X-RAY EXAM CHEST 1 VIEW: CPT

## 2025-03-08 PROCEDURE — 94761 N-INVAS EAR/PLS OXIMETRY MLT: CPT

## 2025-03-08 PROCEDURE — 94664 DEMO&/EVAL PT USE INHALER: CPT

## 2025-03-08 PROCEDURE — 94799 UNLISTED PULMONARY SVC/PX: CPT

## 2025-03-08 PROCEDURE — 63710000001 INSULIN LISPRO (HUMAN) PER 5 UNITS: Performed by: THORACIC SURGERY (CARDIOTHORACIC VASCULAR SURGERY)

## 2025-03-08 RX ORDER — ACETAMINOPHEN 500 MG
1000 TABLET ORAL 3 TIMES DAILY
Qty: 84 TABLET | Refills: 0 | Status: SHIPPED | OUTPATIENT
Start: 2025-03-08 | End: 2025-03-22

## 2025-03-08 RX ORDER — OXYCODONE HYDROCHLORIDE 5 MG/1
5 TABLET ORAL EVERY 4 HOURS PRN
Qty: 18 TABLET | Refills: 0 | Status: SHIPPED | OUTPATIENT
Start: 2025-03-08 | End: 2025-03-11

## 2025-03-08 RX ORDER — ACETAMINOPHEN 500 MG
1000 TABLET ORAL 3 TIMES DAILY
Status: DISCONTINUED | OUTPATIENT
Start: 2025-03-08 | End: 2025-03-08 | Stop reason: HOSPADM

## 2025-03-08 RX ORDER — GABAPENTIN 100 MG/1
100 CAPSULE ORAL EVERY 8 HOURS SCHEDULED
Qty: 90 CAPSULE | Refills: 0 | Status: SHIPPED | OUTPATIENT
Start: 2025-03-08 | End: 2025-04-07

## 2025-03-08 RX ADMIN — GABAPENTIN 100 MG: 100 CAPSULE ORAL at 06:13

## 2025-03-08 RX ADMIN — ACETAMINOPHEN 1000 MG: 500 TABLET ORAL at 10:44

## 2025-03-08 RX ADMIN — HEPARIN SODIUM 5000 UNITS: 5000 INJECTION INTRAVENOUS; SUBCUTANEOUS at 06:14

## 2025-03-08 RX ADMIN — EMPAGLIFLOZIN 25 MG: 25 TABLET, FILM COATED ORAL at 08:44

## 2025-03-08 RX ADMIN — HEPARIN SODIUM 5000 UNITS: 5000 INJECTION INTRAVENOUS; SUBCUTANEOUS at 13:03

## 2025-03-08 RX ADMIN — INSULIN LISPRO 9 UNITS: 100 INJECTION, SOLUTION INTRAVENOUS; SUBCUTANEOUS at 13:04

## 2025-03-08 RX ADMIN — IPRATROPIUM BROMIDE AND ALBUTEROL SULFATE 3 ML: .5; 3 SOLUTION RESPIRATORY (INHALATION) at 07:21

## 2025-03-08 RX ADMIN — METFORMIN HYDROCHLORIDE 500 MG: 1000 TABLET ORAL at 08:44

## 2025-03-08 RX ADMIN — INSULIN LISPRO 10 UNITS: 100 INJECTION, SOLUTION INTRAVENOUS; SUBCUTANEOUS at 08:44

## 2025-03-08 RX ADMIN — ATORVASTATIN CALCIUM 40 MG: 20 TABLET, FILM COATED ORAL at 08:44

## 2025-03-08 RX ADMIN — BUDESONIDE AND FORMOTEROL FUMARATE DIHYDRATE 2 PUFF: 160; 4.5 AEROSOL RESPIRATORY (INHALATION) at 07:23

## 2025-03-08 RX ADMIN — IPRATROPIUM BROMIDE AND ALBUTEROL SULFATE 3 ML: .5; 3 SOLUTION RESPIRATORY (INHALATION) at 14:45

## 2025-03-08 RX ADMIN — GABAPENTIN 100 MG: 100 CAPSULE ORAL at 13:03

## 2025-03-08 RX ADMIN — METOPROLOL TARTRATE 25 MG: 25 TABLET, FILM COATED ORAL at 08:44

## 2025-03-08 NOTE — PROGRESS NOTES
Exercise Oximetry    Patient Name:Steve Mace   MRN: [id number]   Date: 03/08/25             ROOM AIR BASELINE   SpO2% 96   Heart Rate 76   Blood Pressure      EXERCISE ON ROOM AIR SpO2% EXERCISE ON O2 @ LPM SpO2%   1 MINUTE 96 1 MINUTE    2 MINUTES 95 2 MINUTES    3 MINUTES 94 3 MINUTES    4 MINUTES 95 4 MINUTES    5 MINUTES 95 5 MINUTES    6 MINUTES 95 6 MINUTES               Distance Walked   Distance Walked   Dyspnea (Sean Scale)   Dyspnea (Sean Scale)   Fatigue (Sean Scale)   Fatigue (Sean Scale)   SpO2% Post Exercise  95 SpO2% Post Exercise   HR Post Exercise  76 HR Post Exercise   Time to Recovery   Time to Recovery     Comments: Ambulated patient on RA. SATS stayed above 88%. Results above.

## 2025-03-08 NOTE — CASE MANAGEMENT/SOCIAL WORK
Case Management Discharge Note      Final Note: dc home         Selected Continued Care - Discharged on 3/8/2025 Admission date: 3/7/2025 - Discharge disposition: Home or Self Care      Destination    No services have been selected for the patient.                Durable Medical Equipment    No services have been selected for the patient.                Dialysis/Infusion    No services have been selected for the patient.                Home Medical Care    No services have been selected for the patient.                Therapy    No services have been selected for the patient.                Community Resources    No services have been selected for the patient.                Community & DME    No services have been selected for the patient.                    Transportation Services  Private: Car    Final Discharge Disposition Code: 01 - home or self-care

## 2025-03-08 NOTE — PLAN OF CARE
Goal Outcome Evaluation:           Progress: improving     AO x4, SR, 3L NC, Assist x1. C/o pain managed w/ EDILIA x1. CT switched to WS, no air leak. CT drx CDI. NS in L AC running at 75ml/hr.

## 2025-03-08 NOTE — DISCHARGE SUMMARY
Patient Care Team:  Deanne Islas MD as PCP - General (Internal Medicine)  Giana Hughes MD as Consulting Physician (Cardiology)  Sole Jo RN as Nurse Navigator    Date of Admission: 3/7/2025   Date of Discharge:  3/8/2025    Discharge Diagnosis: Right upper lobe adenocarcinoma    Presenting Problem  NSCLC of right lung [C34.91]     History of Present Illness  Steve Mace is a 84 y.o. male who presented with biopsy-proven adenocarcinoma.  After the operative evaluation, Dr. Gastelum recommended to proceed with resection and the patient agreed.    Hospital Course  Patient was admitted status post robot-assisted right upper lobe wedge resection.  For further details, please refer to the operative note.  Stable course.  Chest tube was removed on POD 1 without difficulty and follow-up imaging is stable (9mm ptx, not clinically significant).  Walking oximetry performed prior to discharge did not demonstrate the need for supplemental oxygen given his recent lung resection in the setting of non-small cell lung cancer.  Postoperative care instructions have been discussed with him the medications have been sent to the pharmacy.  He is hemodynamically stable with pain adequately controlled.  He will follow-up in the office in 2 weeks with a chest x-ray.    Procedures Performed  Procedure(s):  THORACOSCOPY WITH DAVINCI ROBOT with right upper lobe wedge resection, Medialstinal and Hylar Lymph Node Dissection, Bronchoscopy, and Intercostal Nerve Block       Consults:   Consults       No orders found for last 30 day(s).            Pertinent Test Results:     Imaging Results (Last 24 Hours)       Procedure Component Value Units Date/Time    XR Chest 1 View [[id number]] Collected: 03/08/25 1318     Updated: 03/08/25 1326    Narrative:      XR CHEST 1 VW-     HISTORY: Male who is 84 years-old, chest tube removal     TECHNIQUE: Frontal view of the chest     COMPARISON: 3/8/2025 at 0 627 hours     FINDINGS:  Previous right chest tube has been removed.  Heart size is normal. Opacity in the right lung appears mildly less  dense. Small likely atelectasis in the left lower lung. Previous right  pneumothorax shows small interval increase, 9 mm peripherally,  previously 4 mm. No left pneumothorax. No large  pleural effusion is noted. Soft tissue gas is again seen at the right  chest wall. No acute osseous process.          Impression:      Small interval increase right pneumothorax status post chest tube  removal.     Discussed by telephone with patient's nurse, Fang, at time of  interpretation, 1320, 3/8/205.     This report was finalized on 3/8/2025 1:23 PM by Dr. Flex Tabares M.D on Workstation: [email or web address]       XR Chest 1 View [[id number]] Collected: 03/08/25 1106     Updated: 03/08/25 1112    Narrative:      XR CHEST 1 VW-     HISTORY: Male who is 84 years-old, postoperative evaluation     TECHNIQUE: Frontal views of the chest     COMPARISON: 3/7/2025     FINDINGS: Right chest tube extends to the medial upper right hemithorax.  The heart appears borderline enlarged. Surgical change of the right lung  is apparent, with persistent, slightly decreased opacity at the right  midlung. Small likely atelectasis in the left lower lung. Small right  pneumothorax, not significantly changed. No left pneumothorax. No large  pleural effusion is noted. Soft tissue gas is again seen at the right  chest wall. No acute osseous process.          Impression:      As described.     This report was finalized on 3/8/2025 11:09 AM by Dr. Flex Tabares M.D on Workstation: [email or web address]       XR Chest 1 View [[id number]] Collected: 03/07/25 1711     Updated: 03/07/25 1728    Narrative:      XR CHEST 1 VW-     HISTORY: Male who is 84 years-old, postoperative evaluation     TECHNIQUE: Frontal views of the chest     COMPARISON: 1/29/2025     FINDINGS: Right chest tube extends to the medial upper right hemithorax.  The heart appears mildly  enlarged; appearance may be exaggerated by low  lung volume. Surgical change of the right lung is apparent, with vague  opacity at the right midlung that could be postsurgical hemorrhage,  atelectasis, infiltrate, follow-up recommended. Small likely atelectasis  in the left lower lung. There appears to be small pneumothorax  peripherally in the right hemithorax. No left pneumothorax. No large  pleural effusion is noted. Soft tissue gas is seen at the right chest  wall. No acute osseous process.       Impression:      Postsurgical changes, as described, with small right  pneumothorax, right chest tube in place.     This report was finalized on 3/7/2025 5:25 PM by Dr. Flex Tabares M.D on Workstation: [id number]               Lab Results (last 24 hours)       Procedure Component Value Units Date/Time    POC Glucose Once [[id number]]  (Abnormal) Collected: 03/08/25 1128    Specimen: Blood Updated: 03/08/25 1131     Glucose 148 mg/dL     POC Glucose Once [[id number]]  (Abnormal) Collected: 03/08/25 0749    Specimen: Blood Updated: 03/08/25 0751     Glucose 217 mg/dL     Basic Metabolic Panel [[id number]]  (Abnormal) Collected: 03/08/25 0622    Specimen: Blood from Arm, Left Updated: 03/08/25 0723     Glucose 209 mg/dL      BUN 19 mg/dL      Creatinine 1.21 mg/dL      Sodium 133 mmol/L      Potassium 4.4 mmol/L      Comment: Slight hemolysis detected by analyzer. Result may be falsely elevated.        Chloride 102 mmol/L      CO2 18.8 mmol/L      Calcium 8.7 mg/dL      BUN/Creatinine Ratio 15.7     Anion Gap 12.2 mmol/L      eGFR 59.8 mL/min/1.73     Narrative:      GFR Categories in Chronic Kidney Disease (CKD)      GFR Category          GFR (mL/min/1.73)    Interpretation  G1                     90 or greater         Normal or high (1)  G2                      60-89                Mild decrease (1)  G3a                   45-59                Mild to moderate decrease  G3b                   30-44                Moderate  to severe decrease  G4                    15-29                Severe decrease  G5                    14 or less           Kidney failure          (1)In the absence of evidence of kidney disease, neither GFR category G1 or G2 fulfill the criteria for CKD.    eGFR calculation 2021 CKD-EPI creatinine equation, which does not include race as a factor    CBC & Differential [[id number]]  (Abnormal) Collected: 03/08/25 0622    Specimen: Blood from Arm, Left Updated: 03/08/25 0650    Narrative:      The following orders were created for panel order CBC & Differential.  Procedure                               Abnormality         Status                     ---------                               -----------         ------                     CBC Auto Differential[[id number]]        Abnormal            Final result                 Please view results for these tests on the individual orders.    CBC Auto Differential [[id number]]  (Abnormal) Collected: 03/08/25 0622    Specimen: Blood from Arm, Left Updated: 03/08/25 0650     WBC 10.74 10*3/mm3      RBC 4.92 10*6/mm3      Hemoglobin 14.9 g/dL      Hematocrit 44.5 %      MCV 90.4 fL      MCH 30.3 pg      MCHC 33.5 g/dL      RDW 13.4 %      RDW-SD 45.0 fl      MPV 9.7 fL      Platelets 216 10*3/mm3      Neutrophil % 89.0 %      Lymphocyte % 5.3 %      Monocyte % 5.1 %      Eosinophil % 0.0 %      Basophil % 0.4 %      Immature Grans % 0.2 %      Neutrophils, Absolute 9.56 10*3/mm3      Lymphocytes, Absolute 0.57 10*3/mm3      Monocytes, Absolute 0.55 10*3/mm3      Eosinophils, Absolute 0.00 10*3/mm3      Basophils, Absolute 0.04 10*3/mm3      Immature Grans, Absolute 0.02 10*3/mm3      nRBC 0.0 /100 WBC     POC Glucose Once [[id number]]  (Abnormal) Collected: 03/07/25 2119    Specimen: Blood Updated: 03/07/25 2121     Glucose 336 mg/dL     POC Glucose Once [[id number]]  (Abnormal) Collected: 03/07/25 1659    Specimen: Blood Updated: 03/07/25 1700     Glucose 135 mg/dL     Tissue  Pathology Exam [[id number]] Collected: 03/07/25 1504    Specimen: Tissue from Lung, Right Upper Lobe; Tissue from Lymph Node; Tissue from Lymph Node; Tissue from Lymph Node; Tissue from Lymph Node; Tissue from Lymph Node Updated: 03/07/25 5464              Condition on Discharge:  stable    Vital Signs  Temp:  [97.3 °F (36.3 °C)-98.8 °F (37.1 °C)] 98.8 °F (37.1 °C)  Heart Rate:  [65-91] 70  Resp:  [14-18] 18  BP: ()/(40-81) 103/68  Arterial Line BP: (108-147)/(48-63) 136/63    Physical Exam:    General Appearance:  Alert, cooperative, in no acute distress   Head:  Normocephalic, without obvious abnormality, atraumatic   Eyes:  Lids and lashes normal, conjunctivae and sclerae normal, no icterus, no pallor, corneas clear, PERRLA   Ears:  Ears appear intact with no abnormalities noted   Throat:  No oral lesions, no thrush, oral mucosa moist   Neck:  No adenopathy, supple, trachea midline, no thyromegaly, no carotid bruit, no JVD   Back:  No kyphosis present, no scoliosis present, no skin lesions, erythema or scars, no tenderness to percussion or palpation, range of motion normal   Lungs:  Clear to auscultation, respirations regular, even and unlabored    Heart:  Regular rhythm and normal rate, normal S1 and S2, no murmur, no gallop, no rub, no click   Chest Wall:  No abnormalities observed   Abdomen:  Normal bowel sounds, no masses, no organomegaly, soft non-tender, non-distended, no guarding, no rebound tenderness   Rectal:  Deferred   Extremities:  Moves all extremities well, no edema, no cyanosis, no redness   Pulses:  Pulses palpable and equal bilaterally   Skin:  No bleeding, bruising or rash-postoperative incisions with approximate of Dermabond intact.  DuoDERM applied to former chest tube site.  No purulence or erythema.   Lymph nodes:  No palpable adenopathy   Neurologic:  Cranial nerves 2 - 12 grossly intact, sensation intact, DTR present and equal bilaterally                                                                                Discharge Disposition  Home today    Discharge Medications     Discharge Medications        New Medications        Instructions Start Date   Acetaminophen Extra Strength 500 MG tablet   1,000 mg, Oral, 3 times daily      gabapentin 100 MG capsule  Commonly known as: NEURONTIN   100 mg, Oral, Every 8 Hours Scheduled      oxyCODONE 5 MG immediate release tablet  Commonly known as: ROXICODONE   5 mg, Oral, Every 4 Hours PRN             Changes to Medications        Instructions Start Date   aspirin 81 MG EC tablet  What changed:   how much to take  how to take this  when to take this  additional instructions   Take 1 tab po bid x 14 days; then take 1 tab po daily x 28 days             Continue These Medications        Instructions Start Date   atorvastatin 40 MG tablet  Commonly known as: LIPITOR   40 mg, Daily      COQ-10 PO   100 mg, Nightly      fish oil 1000 MG capsule capsule   Nightly      Jardiance 25 MG tablet tablet  Generic drug: empagliflozin   Daily      losartan 50 MG tablet  Commonly known as: COZAAR   50 mg, Daily      metFORMIN 500 MG tablet  Commonly known as: GLUCOPHAGE   500 mg, 2 Times Daily With Meals      metoprolol tartrate 50 MG tablet  Commonly known as: LOPRESSOR   25 mg, Daily      multivitamin tablet tablet   Nightly      Symbicort 160-4.5 MCG/ACT inhaler  Generic drug: budesonide-formoterol   2 puffs, 2 Times Daily - RT               Discharge Instructions:  No heavy lifting, pushing, pulling greater than 10 pounds.  No driving up until 2 weeks after surgery and no longer taking narcotics.  Resume home diet as tolerated.  Continue incentive spirometer at least 4 times per day.  Remove dressing from post chest tube site after 48 hours, may shower and clean surgical sites with antibacterial soap or hydrogen peroxide, and apply gauze dressing or band-aid as needed for any drainage.  No dressing needed once no longer draining.          Follow-up  Appointments  Future Appointments   Date Time Provider Department Center   3/24/2025  9:45 AM Alma Gastelum MD [facility]   6/6/2025  7:00 AM Giana Hughes MD [facility]     Additional Instructions for the Follow-ups that You Need to Schedule       XR Chest 2 View    Mar 24, 2025 (Approximate)      Exam reason: post-op   Release to patient: Routine Release                Test Results Pending at Discharge  Pending Labs       Order Current Status    Tissue Pathology Exam In process            For any questions regarding patient's stay, please refer to patient's chart.    Claudine Arroyo DNP, APRN  [facility]  03/08/25  14:57 EST      Greater than 30 minutes spent on the unit discharging this patient, with more than 50% of time spent assessing the patient, counseling the patient on postoperative care and discharge planning.

## 2025-03-08 NOTE — PLAN OF CARE
Problem: Adult Inpatient Plan of Care  Goal: Plan of Care Review  Outcome: Met  Goal: Patient-Specific Goal (Individualized)  Outcome: Met  Goal: Absence of Hospital-Acquired Illness or Injury  Outcome: Met  Intervention: Identify and Manage Fall Risk  Recent Flowsheet Documentation  Taken 3/8/2025 1210 by Anat Roland RN  Safety Promotion/Fall Prevention:   clutter free environment maintained   activity supervised  Taken 3/8/2025 1024 by Anat Roland RN  Safety Promotion/Fall Prevention:   clutter free environment maintained   activity supervised  Taken 3/8/2025 0848 by Anat Roland RN  Safety Promotion/Fall Prevention:   clutter free environment maintained   activity supervised  Intervention: Prevent and Manage VTE (Venous Thromboembolism) Risk  Recent Flowsheet Documentation  Taken 3/8/2025 0848 by Anat Roland RN  VTE Prevention/Management:   bilateral   SCDs (sequential compression devices) on  Intervention: Prevent Infection  Recent Flowsheet Documentation  Taken 3/8/2025 1210 by Anat Roland RN  Infection Prevention: single patient room provided  Taken 3/8/2025 1024 by Anat Roland RN  Infection Prevention: single patient room provided  Taken 3/8/2025 0848 by Anat Roland RN  Infection Prevention: single patient room provided  Goal: Optimal Comfort and Wellbeing  Outcome: Met  Intervention: Provide Person-Centered Care  Recent Flowsheet Documentation  Taken 3/8/2025 1416 by Anat Roland RN  Trust Relationship/Rapport:   care explained   choices provided  Taken 3/8/2025 0848 by Anat Roland RN  Trust Relationship/Rapport:   care explained   choices provided  Goal: Readiness for Transition of Care  Outcome: Met     Problem: Fall Injury Risk  Goal: Absence of Fall and Fall-Related Injury  Outcome: Met  Intervention: Identify and Manage Contributors  Recent Flowsheet Documentation  Taken 3/8/2025 1210 by Anat Roland RN  Medication Review/Management: medications reviewed  Taken 3/8/2025 1024  by Anat Roland RN  Medication Review/Management: medications reviewed  Taken 3/8/2025 0848 by Anat Roland RN  Medication Review/Management: medications reviewed  Intervention: Promote Injury-Free Environment  Recent Flowsheet Documentation  Taken 3/8/2025 1210 by Anat Roland RN  Safety Promotion/Fall Prevention:   clutter free environment maintained   activity supervised  Taken 3/8/2025 1024 by Anat Roland RN  Safety Promotion/Fall Prevention:   clutter free environment maintained   activity supervised  Taken 3/8/2025 0848 by Anat Roland RN  Safety Promotion/Fall Prevention:   clutter free environment maintained   activity supervised     Problem: Comorbidity Management  Goal: Maintenance of COPD Symptom Control  Outcome: Met  Intervention: Maintain COPD (Chronic Obstructive Pulmonary Disease) Symptom Control  Recent Flowsheet Documentation  Taken 3/8/2025 1210 by Anat Roland RN  Medication Review/Management: medications reviewed  Taken 3/8/2025 1024 by Anat Roland RN  Medication Review/Management: medications reviewed  Taken 3/8/2025 0848 by Anat Roland RN  Medication Review/Management: medications reviewed  Goal: Blood Glucose Level Within Target Range  Outcome: Met  Intervention: Monitor and Manage Glycemia  Recent Flowsheet Documentation  Taken 3/8/2025 1210 by Anat Roland RN  Medication Review/Management: medications reviewed  Taken 3/8/2025 1024 by Anat Roland RN  Medication Review/Management: medications reviewed  Taken 3/8/2025 0848 by Anat Roland RN  Medication Review/Management: medications reviewed  Goal: Blood Pressure in Desired Range  Outcome: Met  Intervention: Maintain Blood Pressure Management  Recent Flowsheet Documentation  Taken 3/8/2025 1210 by Anat Roland RN  Medication Review/Management: medications reviewed  Taken 3/8/2025 1024 by Anat Roland RN  Medication Review/Management: medications reviewed  Taken 3/8/2025 0848 by Anat Roland RN  Medication  Review/Management: medications reviewed   Goal Outcome Evaluation:

## 2025-03-09 NOTE — OUTREACH NOTE
Prep Survey      Flowsheet Row Responses   [facility] facility patient discharged from? [place]   Is LACE score < 7 ? No   Eligibility Readm Mgmt   Discharge diagnosis Thoracoscopy with right upper lobe wedge resection   Does the patient have one of the following disease processes/diagnoses(primary or secondary)? Cardiothoracic surgery   Does the patient have Home health ordered? No   Is there a DME ordered? No   Prep survey completed? Yes            JUAQUIN SOUZA - Registered Nurse

## 2025-03-10 LAB
CYTO UR: NORMAL
LAB AP CASE REPORT: NORMAL
LAB AP CLINICAL INFORMATION: NORMAL
LAB AP DIAGNOSIS COMMENT: NORMAL
LAB AP SPECIAL STAINS: NORMAL
LAB AP SYNOPTIC CHECKLIST: NORMAL
Lab: NORMAL
PATH REPORT.FINAL DX SPEC: NORMAL
PATH REPORT.GROSS SPEC: NORMAL

## 2025-03-11 ENCOUNTER — TELEPHONE (OUTPATIENT)
Dept: SURGERY | Facility: CLINIC | Age: 85
End: 2025-03-11
Payer: MEDICARE

## 2025-03-11 ENCOUNTER — NURSE TRIAGE (OUTPATIENT)
Dept: CALL CENTER | Facility: HOSPITAL | Age: 85
End: 2025-03-11
Payer: MEDICARE

## 2025-03-11 NOTE — TELEPHONE ENCOUNTER
"Reason for Disposition   Health Information question, no triage required and triager able to answer question    Additional Information   Negative: [1] Caller is not with the adult (patient) AND [2] reporting urgent symptoms   Negative: Lab result questions   Negative: Medication questions   Negative: Caller can't be reached by phone   Negative: Caller has already spoken to PCP or another triager   Negative: RN needs further essential information from caller in order to complete triage   Negative: Requesting regular office appointment   Negative: [1] Caller requesting NON-URGENT health information AND [2] PCP's office is the best resource    Answer Assessment - Initial Assessment Questions  1. REASON FOR CALL or QUESTION: \"What is your reason for calling today?\" or \"How can I best help you?\" or \"What question do you have that I can help answer?\"      Caller asking if he can take Nyquil with his other meds.    Protocols used: Information Only Call - No Triage-ADULT-    "

## 2025-03-11 NOTE — TELEPHONE ENCOUNTER
Nasal Congestion 03/11/2025 Patient states he had lung surgery last Friday and now has a runny nose, asking what he can take with Gabapentin and Tylenol.   Asking particularly if he can take Nyquil while taking Gabapentin and Tylenol for pain.   Drug compatibility reviewed per [employer] leonora and shows no major incompatibilities. Advised this may cause drowsiness. States he does not care about that.   Also advised he may want to check with his Surgeon to make sure they are ok with this. States he will call the office.

## 2025-03-11 NOTE — TELEPHONE ENCOUNTER
Patient called as he just had surgery on 3/7/2025 and asked if it would be ok if he took an antihistamine as he has a runny nose. I confirmed that it was ok to take and patient voiced understanding and said he would call the office if he had any further questions.

## 2025-03-13 ENCOUNTER — READMISSION MANAGEMENT (OUTPATIENT)
Dept: CALL CENTER | Facility: HOSPITAL | Age: 85
End: 2025-03-13
Payer: MEDICARE

## 2025-03-13 NOTE — OUTREACH NOTE
CT Surgery Week 1 Survey      Flowsheet Row Responses   [facility] patient discharged from? [place]   Does the patient have one of the following disease processes/diagnoses(primary or secondary)? Cardiothoracic surgery   Week 1 attempt successful? Yes   Call start time 1255   Call end time 1300   Discharge diagnosis Thoracoscopy with right upper lobe wedge resection   Is the patient taking all medications as directed (includes completed medication regime)? Yes   Does the patient have a primary care provider?  Yes   Comments Follow up with surgery 3/24   Psychosocial issues? No   What is the patient's perception of their health status since discharge? Improving   Is the patient /caregiver able to teach back basic post-op care? Practice cough and deep breath every 4 hours while awake   Is the patient/caregiver able to teach back signs and symptoms of incisional infection? Increased redness, swelling or pain at the incisonal site, Increased drainage or bleeding, Incisional warmth, Pus or odor from incision, Fever   Is the patient/caregiver able to teach back the hierarchy of who to call/visit for symptoms/problems? PCP, Specialist, Home health nurse, Urgent Care, ED, 911 Yes   Week 1 call completed? Yes   Graduated Yes   Is the patient interested in additional calls from an ambulatory ? No   Would this patient benefit from a Referral to [facility] Social Work? No   Wrap up additional comments Patient reports no issues from surgery. Patient complains of congestion and cough. Patient states he is taking OTC medication for this.   Call end time 1300              MAXIM DOWELL - Registered Nurse

## 2025-03-17 ENCOUNTER — PATIENT OUTREACH (OUTPATIENT)
Dept: OTHER | Facility: HOSPITAL | Age: 85
End: 2025-03-17
Payer: MEDICARE

## 2025-03-17 NOTE — PROGRESS NOTES
Call from patient, left vm with post op questions.  Contacted Claudine thoracic surgery APRN regarding patient questions.    Called patient. Provided answers to his questions after consulting Claudine.    Navigation will continue to follow.

## 2025-03-21 ENCOUNTER — TELEPHONE (OUTPATIENT)
Dept: SURGERY | Facility: CLINIC | Age: 85
End: 2025-03-21
Payer: MEDICARE

## 2025-03-21 NOTE — TELEPHONE ENCOUNTER
LVM reminding patient to get his chest xray prior to his appointment with our office on 3/24/2025. Informed patient to call our office if he has any questions.

## 2025-03-24 ENCOUNTER — TELEPHONE (OUTPATIENT)
Dept: SURGERY | Facility: CLINIC | Age: 85
End: 2025-03-24
Payer: MEDICARE

## 2025-03-24 ENCOUNTER — HOSPITAL ENCOUNTER (OUTPATIENT)
Dept: GENERAL RADIOLOGY | Facility: HOSPITAL | Age: 85
Discharge: HOME | End: 2025-03-24
Admitting: NURSE PRACTITIONER
Payer: MEDICARE

## 2025-03-24 ENCOUNTER — OFFICE VISIT (OUTPATIENT)
Dept: SURGERY | Facility: CLINIC | Age: 85
End: 2025-03-24
Payer: MEDICARE

## 2025-03-24 ENCOUNTER — PATIENT OUTREACH (OUTPATIENT)
Dept: OTHER | Facility: HOSPITAL | Age: 85
End: 2025-03-24
Payer: MEDICARE

## 2025-03-24 VITALS
DIASTOLIC BLOOD PRESSURE: 50 MMHG | WEIGHT: 221.4 LBS | OXYGEN SATURATION: 93 % | BODY MASS INDEX: 31.77 KG/M2 | HEART RATE: 78 BPM | RESPIRATION RATE: 16 BRPM | SYSTOLIC BLOOD PRESSURE: 128 MMHG

## 2025-03-24 DIAGNOSIS — C34.91 NSCLC OF RIGHT LUNG: Primary | ICD-10-CM

## 2025-03-24 DIAGNOSIS — C34.91 NSCLC OF RIGHT LUNG: ICD-10-CM

## 2025-03-24 PROCEDURE — 3078F DIAST BP <80 MM HG: CPT | Performed by: THORACIC SURGERY (CARDIOTHORACIC VASCULAR SURGERY)

## 2025-03-24 PROCEDURE — 1159F MED LIST DOCD IN RCRD: CPT | Performed by: THORACIC SURGERY (CARDIOTHORACIC VASCULAR SURGERY)

## 2025-03-24 PROCEDURE — 71046 X-RAY EXAM CHEST 2 VIEWS: CPT

## 2025-03-24 PROCEDURE — 99024 POSTOP FOLLOW-UP VISIT: CPT | Performed by: THORACIC SURGERY (CARDIOTHORACIC VASCULAR SURGERY)

## 2025-03-24 PROCEDURE — 1160F RVW MEDS BY RX/DR IN RCRD: CPT | Performed by: THORACIC SURGERY (CARDIOTHORACIC VASCULAR SURGERY)

## 2025-03-24 PROCEDURE — 3074F SYST BP LT 130 MM HG: CPT | Performed by: THORACIC SURGERY (CARDIOTHORACIC VASCULAR SURGERY)

## 2025-03-24 NOTE — LETTER
"March 25, 2025     Deanne Islas MD  [address]  [place] [phone]    Patient: Steve Mace   YOB: 1940   Date of Visit: 3/24/2025     Dear Deanne Islas MD:       Thank you for referring Steve Mace to me for evaluation. Below are the relevant portions of my assessment and plan of care.    If you have questions, please do not hesitate to call me. I look forward to following Steve along with you.         Sincerely,        Alma Gastelum MD        CC: No Recipients    Alma Gastelum MD  03/25/25 0902  Sign when Signing Visit  Chief Complaint  NSCLC    Subjective       History of Present Illness  The patient is an 84-year-old gentleman who returns for a postoperative follow-up.    He reports overall good health, with the exception of persistent soreness at the surgical site. He has been prescribed gabapentin for this discomfort but expresses uncertainty about its efficacy. His supply of Tylenol has been depleted as of today. He also experiences a cough, which he attributes to phlegm production. He inquires about the potential benefits of heat application and physical exercises such as planks or push-ups. Additionally, he questions the possibility of using Mucinex to alleviate his cough. He is currently on gabapentin and Tylenol.    MEDICATIONS  gabapentin, Tylenol      Objective  Vital Signs:  /50 (BP Location: Right arm, Patient Position: Sitting, Cuff Size: Adult)   Pulse 78   Resp 16   Wt 100 kg (221 lb 6.4 oz)   SpO2 93%   BMI 31.77 kg/m²   Estimated body mass index is 31.77 kg/m² as calculated from the following:    Height as of 3/7/25: 177.8 cm (70\").    Weight as of this encounter: 100 kg (221 lb 6.4 oz).          Physical Exam  Vitals and nursing note reviewed.   Constitutional:       Appearance: He is well-developed.   HENT:      Head: Normocephalic and atraumatic.      Nose: Nose normal.   Eyes:      Conjunctiva/sclera: Conjunctivae normal. "   Pulmonary:      Effort: Pulmonary effort is normal.   Musculoskeletal:         General: Normal range of motion.      Cervical back: Normal range of motion and neck supple.   Skin:     General: Skin is warm and dry.   Neurological:      Mental Status: He is alert and oriented to person, place, and time.   Psychiatric:         Behavior: Behavior normal.         Thought Content: Thought content normal.         Judgment: Judgment normal.          Physical Exam  Incisions were examined.    Result Review:           Results  Laboratory Studies  Pathology demonstrates a T2a N0 adenocarcinoma with visceral pleural invasion, carcinoma measures 8 mm in size, lymph nodes negative, no other high risk features.    Imaging  Chest x-ray performed today demonstrates good expansion of bilateral lungs, no effusion, no pneumothorax.           Assessment and Plan   Diagnoses and all orders for this visit:    1. NSCLC of right lung (Primary)  -     Ambulatory Referral to Oncology  -     CT Chest Without Contrast; Future        Assessment & Plan  1. Postoperative follow-up for T2a N0 M0 adenocarcinoma.  He is status post resection with negative margins. Pathology demonstrates a T2a N0 adenocarcinoma with visceral pleural invasion, carcinoma measures 8 mm in size, lymph nodes negative, no other high risk features. Chest x-ray performed today demonstrates good expansion of bilateral lungs, no effusion, no pneumothorax. All margins were negative at the time of surgery.  The cancer was 8 mm in size and invaded the visceral pleura, which is considered a high-risk feature, putting him at a slightly higher risk for recurrence. This changes his diagnosis from stage IA to stage IB lung cancer. Given the presence of visceral pleural invasion, a consultation with an oncologist is necessary to discuss potential adjuvant therapy. However, considering the small size of the nodule, it is unlikely that chemotherapy will be recommended. He is advised to  continue gabapentin 3 times daily until the prescription is finished to aid in nerve regeneration post-surgery. Over-the-counter Tylenol can be used as needed for pain management. Heat or ice application may also provide relief. Gentle walking is encouraged as part of his recovery process. His cough should improve over time, and Mucinex or Robitussin can be used to help loosen phlegm. He is cleared to resume CPAP use, driving, and all normal activities unless he is taking narcotic pain medication. He is also permitted to play golf. A CT scan of the chest is scheduled in 4 months to initiate lung cancer surveillance. Follow-up visits with me or my nurse practitioner are planned every 4 months for the first year, with a CT scan at each visit, then every 6 months up to 5 years, and annually up to 10 years.    Follow-up  The patient will follow up in 4 months.             Follow Up   No follow-ups on file.  Patient was given instructions and counseling regarding his condition or for health maintenance advice. Please see specific information pulled into the AVS if appropriate.     Patient or patient representative verbalized consent for the use of Ambient Listening during the visit with  Alma Gastelum MD for chart documentation. 3/25/2025  09:00 EDT

## 2025-03-24 NOTE — PROGRESS NOTES
Reviewed chart.  IP 3/7-3/8/25, S/p RUL wedge resection.    I accompanied patient and wife to Dr. Gastelum's post-op office appt today. The patient's incisions were healing well. He reports some post-op neuralgia. Dr. 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  He will be referred to medical oncology for consultation although Dr. Gastelum does not anticipate systemic therapy will be needed. Navigation will facilitate this appt. Dr. Gastelum answered all questions. The patient will have a CT scan in 4 months and meet with Claudine 7/24.    We discussed common post-op symptoms and average duration of these. Patient verbalized understanding.    The patient denies questions, concerns or resource needs. Navigation will continue to follow; encouraged patient to call as needed.     Message to Angus/Tali about med onc appt

## 2025-03-25 PROBLEM — C44.211 BASAL CELL CARCINOMA (BCC) OF SKIN OF EAR: Status: ACTIVE | Noted: 2024-06-05

## 2025-03-25 PROBLEM — D04.62 CARCINOMA IN SITU OF SKIN OF LEFT UPPER LIMB, INCLUDING SHOULDER: Status: ACTIVE | Noted: 2017-09-25

## 2025-03-25 PROBLEM — C44.621 SQUAMOUS CELL CARCINOMA OF UPPER EXTREMITY: Status: ACTIVE | Noted: 2025-02-21

## 2025-03-25 NOTE — PROGRESS NOTES
"Chief Complaint  NSCLC    Subjective        History of Present Illness  The patient is an 84-year-old gentleman who returns for a postoperative follow-up.    He reports overall good health, with the exception of persistent soreness at the surgical site. He has been prescribed gabapentin for this discomfort but expresses uncertainty about its efficacy. His supply of Tylenol has been depleted as of today. He also experiences a cough, which he attributes to phlegm production. He inquires about the potential benefits of heat application and physical exercises such as planks or push-ups. Additionally, he questions the possibility of using Mucinex to alleviate his cough. He is currently on gabapentin and Tylenol.    MEDICATIONS  gabapentin, Tylenol      Objective   Vital Signs:  /50 (BP Location: Right arm, Patient Position: Sitting, Cuff Size: Adult)   Pulse 78   Resp 16   Wt 100 kg (221 lb 6.4 oz)   SpO2 93%   BMI 31.77 kg/m²   Estimated body mass index is 31.77 kg/m² as calculated from the following:    Height as of 3/7/25: 177.8 cm (70\").    Weight as of this encounter: 100 kg (221 lb 6.4 oz).          Physical Exam  Vitals and nursing note reviewed.   Constitutional:       Appearance: He is well-developed.   HENT:      Head: Normocephalic and atraumatic.      Nose: Nose normal.   Eyes:      Conjunctiva/sclera: Conjunctivae normal.   Pulmonary:      Effort: Pulmonary effort is normal.   Musculoskeletal:         General: Normal range of motion.      Cervical back: Normal range of motion and neck supple.   Skin:     General: Skin is warm and dry.   Neurological:      Mental Status: He is alert and oriented to person, place, and time.   Psychiatric:         Behavior: Behavior normal.         Thought Content: Thought content normal.         Judgment: Judgment normal.          Physical Exam  Incisions were examined.    Result Review :           Results  Laboratory Studies  Pathology demonstrates a T2a N0 " adenocarcinoma with visceral pleural invasion, carcinoma measures 8 mm in size, lymph nodes negative, no other high risk features.    Imaging  Chest x-ray performed today demonstrates good expansion of bilateral lungs, no effusion, no pneumothorax.           Assessment and Plan   Diagnoses and all orders for this visit:    1. NSCLC of right lung (Primary)  -     Ambulatory Referral to Oncology  -     CT Chest Without Contrast; Future        Assessment & Plan  1. Postoperative follow-up for T2a N0 M0 adenocarcinoma.  He is status post resection with negative margins. Pathology demonstrates a T2a N0 adenocarcinoma with visceral pleural invasion, carcinoma measures 8 mm in size, lymph nodes negative, no other high risk features. Chest x-ray performed today demonstrates good expansion of bilateral lungs, no effusion, no pneumothorax. All margins were negative at the time of surgery.  The cancer was 8 mm in size and invaded the visceral pleura, which is considered a high-risk feature, putting him at a slightly higher risk for recurrence. This changes his diagnosis from stage IA to stage IB lung cancer. Given the presence of visceral pleural invasion, a consultation with an oncologist is necessary to discuss potential adjuvant therapy. However, considering the small size of the nodule, it is unlikely that chemotherapy will be recommended. He is advised to continue gabapentin 3 times daily until the prescription is finished to aid in nerve regeneration post-surgery. Over-the-counter Tylenol can be used as needed for pain management. Heat or ice application may also provide relief. Gentle walking is encouraged as part of his recovery process. His cough should improve over time, and Mucinex or Robitussin can be used to help loosen phlegm. He is cleared to resume CPAP use, driving, and all normal activities unless he is taking narcotic pain medication. He is also permitted to play golf. A CT scan of the chest is scheduled in  4 months to initiate lung cancer surveillance. Follow-up visits with me or my nurse practitioner are planned every 4 months for the first year, with a CT scan at each visit, then every 6 months up to 5 years, and annually up to 10 years.    Follow-up  The patient will follow up in 4 months.             Follow Up   No follow-ups on file.  Patient was given instructions and counseling regarding his condition or for health maintenance advice. Please see specific information pulled into the AVS if appropriate.     Patient or patient representative verbalized consent for the use of Ambient Listening during the visit with  Alma Gastelum MD for chart documentation. 3/25/2025  09:00 EDT           Name band;

## 2025-04-15 NOTE — PROGRESS NOTES
REFERRING PROVIDER:    Alma Gastelum MD  [address]  [address]  [place] [address]    REASON FOR CONSULTATION: pT2, N0, stage Ib invasive moderately differentiated pulmonary adenocarcinoma measuring 0.8 cm, visceropleural invasion present    HISTORY OF PRESENT ILLNESS:  Steve Mace is a 84 y.o. 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     He has a history of right upper lobe pulmonary nodules dating back at least 2 November 2019.    CT imaging on 7/10/2024 showed slightly larger 6 mm peripheral right upper lobe lung nodule.    1/16/2025: CT imaging with enlarging 10 mm peripheral subpleural focus of consolidation in the right upper lobe.    1/29/2025: CT-guided needle biopsy with moderately differentiated pulmonary adenocarcinoma.    2/5/2025: PET CT scan with some breathing artifact.  No mediastinal or hilar lymphadenopathy.  No other metastatic disease.    3/7/2025: Wedge resection by Dr. Gastleum.  Pathology showed invasive moderately differentiated pulmonary adenocarcinoma measuring 0.8 x 0.8 x 0.7 cm with visceral pleura invasion.  Margins were negative.  All lymph nodes including a right level 8, right level 7, right level 4, right level 10, and right level 11 node were negative.  Pathologic stage Ib, pT2, N0.    He is healing from surgery very well. He continues to have a little chest discomfort. He does have a cough over the past couple of weeks and is planning to start some antibiotics, steroids, and anti-histamines for this. He is active.     Past Medical History:   Diagnosis Date    Acute myopericarditis     Arthritis     OSTEOARTHRITIS    Chronic renal  insufficiency     COPD (chronic obstructive pulmonary disease)     DR. CABRERA EVERY 6 MONTHS    Coronary artery disease     Diabetes     Hyperlipidemia     Hypertension     Lumbosacral disc disease 1970'S    Lung cancer     RIGHT UPPER LOBE    Mitral valve prolapse     Prostate cancer     Skin cancer     Sleep apnea     CPAP-SOME USE, PROVIDED EDUCATION ON SAFE SURGERY SALE SLEEP    Thoracic aortic aneurysm without rupture     Urinary incontinence     WEAR DAILY PAD       Past Surgical History:   Procedure Laterality Date    COLONOSCOPY      PROSTATECTOMY      THORACOSCOPY WITH WEDGE RESECTION Right 3/7/2025    Procedure: THORACOSCOPY WITH DAVINCI ROBOT with right upper lobe wedge resection, Medialstinal and Hylar Lymph Node Dissection, Bronchoscopy, and Intercostal Nerve Block;  Surgeon: Alma Gastelum MD;  Location: [facility];  Service: Robotics - DaVinci;  Laterality: Right;    TOTAL KNEE ARTHROPLASTY Right 10/22/2021    Procedure: TOTAL KNEE ARTHROPLASTY;  Surgeon: Iban Corrales MD;  Location: [facility] OR;  Service: Orthopedics;  Laterality: Right;       SOCIAL HISTORY:   reports that he quit smoking about 28 years ago. His smoking use included cigarettes. He started smoking about 64 years ago. He has a 35.8 pack-year smoking history. He has never used smokeless tobacco. He reports current alcohol use. He reports that he does not use drugs.    FAMILY HISTORY:  family history includes Diabetes in his father, maternal grandfather, and another family member; Heart attack in his brother and father; Heart disease in his brother.    ALLERGIES:  No Known Allergies    MEDICATIONS:  The medication list has been reviewed with the patient by the medical assistant, and the list has been updated in the electronic medical record, which I reviewed.  Medication dosages and frequencies were confirmed to be accurate.    REVIEW OF SYSTEMS  Review of Systems   Respiratory:  Positive for cough and chest tightness  "(post-operative, improving).    All other systems reviewed and are negative.      PHYSICAL EXAMINATION  Vitals:    04/16/25 1446   BP: 133/83   Pulse: 68   Temp: 97.6 °F (36.4 °C)   TempSrc: Oral   SpO2: 94%   Weight: 100 kg (221 lb 8 oz)   Height: 177.8 cm (70\")   PainSc: 0-No pain     General:  No acute distress, awake, alert and oriented  Skin:  Warm and dry, no visible rash  HEENT:  Normocephalic/atraumatic.   Chest:  Normal respiratory effort  Extremities:  No visible clubbing, cyanosis, or edema  Neuro/psych:  Grossly nonfocal.  Normal mood and affect.       DIAGNOSTIC DATA:    Results for orders placed or performed in visit on 04/16/25   CBC Auto Differential    Collection Time: 04/16/25  2:01 PM    Specimen: Blood   Result Value Ref Range    WBC 6.47 3.40 - 10.80 10*3/mm3    RBC 5.06 4.14 - 5.80 10*6/mm3    Hemoglobin 15.2 13.0 - 17.7 g/dL    Hematocrit 44.9 37.5 - 51.0 %    MCV 88.7 79.0 - 97.0 fL    MCH 30.0 26.6 - 33.0 pg    MCHC 33.9 31.5 - 35.7 g/dL    RDW 13.9 12.3 - 15.4 %    RDW-SD 44.4 37.0 - 54.0 fl    MPV 9.3 6.0 - 12.0 fL    Platelets 285 140 - 450 10*3/mm3    Neutrophil % 59.2 42.7 - 76.0 %    Lymphocyte % 21.8 19.6 - 45.3 %    Monocyte % 6.8 5.0 - 12.0 %    Eosinophil % 10.2 (H) 0.3 - 6.2 %    Basophil % 1.2 0.0 - 1.5 %    Immature Grans % 0.8 (H) 0.0 - 0.5 %    Neutrophils, Absolute 3.83 1.70 - 7.00 10*3/mm3    Lymphocytes, Absolute 1.41 0.70 - 3.10 10*3/mm3    Monocytes, Absolute 0.44 0.10 - 0.90 10*3/mm3    Eosinophils, Absolute 0.66 (H) 0.00 - 0.40 10*3/mm3    Basophils, Absolute 0.08 0.00 - 0.20 10*3/mm3    Immature Grans, Absolute 0.05 0.00 - 0.05 10*3/mm3    nRBC 0.0 0.0 - 0.2 /100 WBC       IMAGING:    NM PET/CT Skull Base to Mid Thigh (02/05/2025 14:41)  CT Chest Without Contrast Diagnostic (01/16/2025 08:04)    Images reviewed. Small but enlarging right lung nodule.    ASSESSMENT:    This is a 84 y.o. male with:    *Stage Ib adenocarcinoma of the right upper lobe  He has a history of " right upper lobe pulmonary nodules dating back at least 2 November 2019.  CT imaging on 7/10/2024 showed slightly larger 6 mm peripheral right upper lobe lung nodule.  1/16/2025: CT imaging with enlarging 10 mm peripheral subpleural focus of consolidation in the right upper lobe.  1/29/2025: CT-guided needle biopsy with moderately differentiated pulmonary adenocarcinoma.  2/5/2025: PET CT scan with some breathing artifact.  No mediastinal or hilar lymphadenopathy.  No other metastatic disease.  3/7/2025: Wedge resection by Dr. Gastelum.  Pathology showed invasive moderately differentiated pulmonary adenocarcinoma measuring 0.8 x 0.8 x 0.7 cm with visceral pleura invasion.  Margins were negative.  All lymph nodes including a right level 8, right level 7, right level 4, right level 10, and right level 11 node were negative.  Pathologic stage Ib, pT2, N0.    *History of prostate cancer  Status post open prostatectomy many years ago  No evidence for recurrence    PLAN:   Send [facility] NGS on the surgical specimen from 3/7  No adjuvant chemotherapy is required given the very small tumor size  If an EGFR mutation is present on NGS, adjuvant osimertinib will be offered  I will follow up NGS results and see him back if needed. Otherwise, he will follow up with thoracic surgery for surveillance imaging.

## 2025-04-16 ENCOUNTER — LAB (OUTPATIENT)
Dept: LAB | Facility: HOSPITAL | Age: 85
End: 2025-04-16
Payer: MEDICARE

## 2025-04-16 ENCOUNTER — CONSULT (OUTPATIENT)
Dept: ONCOLOGY | Facility: CLINIC | Age: 85
End: 2025-04-16
Payer: MEDICARE

## 2025-04-16 VITALS
SYSTOLIC BLOOD PRESSURE: 133 MMHG | BODY MASS INDEX: 31.71 KG/M2 | HEART RATE: 68 BPM | WEIGHT: 221.5 LBS | DIASTOLIC BLOOD PRESSURE: 83 MMHG | TEMPERATURE: 97.6 F | HEIGHT: 70 IN | OXYGEN SATURATION: 94 %

## 2025-04-16 DIAGNOSIS — R79.89 ABNORMAL CBC: Primary | ICD-10-CM

## 2025-04-16 DIAGNOSIS — C34.11 MALIGNANT NEOPLASM OF BRONCHUS OF RIGHT UPPER LOBE: Primary | ICD-10-CM

## 2025-04-16 LAB
BASOPHILS # BLD AUTO: 0.08 10*3/MM3 (ref 0–0.2)
BASOPHILS NFR BLD AUTO: 1.2 % (ref 0–1.5)
DEPRECATED RDW RBC AUTO: 44.4 FL (ref 37–54)
EOSINOPHIL # BLD AUTO: 0.66 10*3/MM3 (ref 0–0.4)
EOSINOPHIL NFR BLD AUTO: 10.2 % (ref 0.3–6.2)
ERYTHROCYTE [DISTWIDTH] IN BLOOD BY AUTOMATED COUNT: 13.9 % (ref 12.3–15.4)
HCT VFR BLD AUTO: 44.9 % (ref 37.5–51)
HGB BLD-MCNC: 15.2 G/DL (ref 13–17.7)
IMM GRANULOCYTES # BLD AUTO: 0.05 10*3/MM3 (ref 0–0.05)
IMM GRANULOCYTES NFR BLD AUTO: 0.8 % (ref 0–0.5)
LYMPHOCYTES # BLD AUTO: 1.41 10*3/MM3 (ref 0.7–3.1)
LYMPHOCYTES NFR BLD AUTO: 21.8 % (ref 19.6–45.3)
MCH RBC QN AUTO: 30 PG (ref 26.6–33)
MCHC RBC AUTO-ENTMCNC: 33.9 G/DL (ref 31.5–35.7)
MCV RBC AUTO: 88.7 FL (ref 79–97)
MONOCYTES # BLD AUTO: 0.44 10*3/MM3 (ref 0.1–0.9)
MONOCYTES NFR BLD AUTO: 6.8 % (ref 5–12)
NEUTROPHILS NFR BLD AUTO: 3.83 10*3/MM3 (ref 1.7–7)
NEUTROPHILS NFR BLD AUTO: 59.2 % (ref 42.7–76)
NRBC BLD AUTO-RTO: 0 /100 WBC (ref 0–0.2)
PLATELET # BLD AUTO: 285 10*3/MM3 (ref 140–450)
PMV BLD AUTO: 9.3 FL (ref 6–12)
RBC # BLD AUTO: 5.06 10*6/MM3 (ref 4.14–5.8)
WBC NRBC COR # BLD AUTO: 6.47 10*3/MM3 (ref 3.4–10.8)

## 2025-04-16 PROCEDURE — 85025 COMPLETE CBC W/AUTO DIFF WBC: CPT

## 2025-04-16 PROCEDURE — 36415 COLL VENOUS BLD VENIPUNCTURE: CPT

## 2025-04-16 NOTE — LETTER
April 16, 2025     Deanne Islas MD  [address]  [address]  [facility] [address]    Patient: Steve Mace   YOB: 1940   Date of Visit: 4/16/2025     Dear Deanne Islas MD:       Thank you for referring Steve Mace to me for evaluation. Below are the relevant portions of my assessment and plan of care.    If you have questions, please do not hesitate to call me. I look forward to following Steve along with you.         Sincerely,        Freddie Hutson MD        CC: MD Haresh Vick Andrew, MD  04/16/25 1787  Sign when Signing Visit  REFERRING PROVIDER:    Alma Gastelum MD  [address] [facility] 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     He has a history of right upper lobe pulmonary nodules dating back at least 2 November 2019.    CT imaging on 7/10/2024 showed slightly larger 6 mm peripheral right upper lobe lung nodule.    1/16/2025: CT imaging with enlarging 10 mm peripheral subpleural focus of consolidation in the right upper lobe.    1/29/2025: CT-guided needle biopsy with moderately differentiated pulmonary adenocarcinoma.    2/5/2025: PET CT scan with some breathing artifact.  No mediastinal or hilar lymphadenopathy.  No other metastatic disease.    3/7/2025: Wedge resection by Dr. Gastelum.  Pathology showed invasive moderately differentiated pulmonary adenocarcinoma measuring 0.8 x  0.8 x 0.7 cm with visceral pleura invasion.  Margins were negative.  All lymph nodes including a right level 8, right level 7, right level 4, right level 10, and right level 11 node were negative.  Pathologic stage Ib, pT2, N0.    He is healing from surgery very well. He continues to have a little chest discomfort. He does have a cough over the past couple of weeks and is planning to start some antibiotics, steroids, and anti-histamines for this. He is active.     Past Medical History:   Diagnosis Date   • Acute myopericarditis    • Arthritis     OSTEOARTHRITIS   • Chronic renal insufficiency    • COPD (chronic obstructive pulmonary disease)     DR. CABRERA EVERY 6 MONTHS   • Coronary artery disease    • Diabetes    • Hyperlipidemia    • Hypertension    • Lumbosacral disc disease 1970'S   • Lung cancer     RIGHT UPPER LOBE   • Mitral valve prolapse    • Prostate cancer    • Skin cancer    • Sleep apnea     CPAP-SOME USE, PROVIDED EDUCATION ON SAFE SURGERY SALE SLEEP   • Thoracic aortic aneurysm without rupture    • Urinary incontinence     WEAR DAILY PAD       Past Surgical History:   Procedure Laterality Date   • COLONOSCOPY     • PROSTATECTOMY     • THORACOSCOPY WITH WEDGE RESECTION Right 3/7/2025    Procedure: THORACOSCOPY WITH DAVINCI ROBOT with right upper lobe wedge resection, Medialstinal and Hylar Lymph Node Dissection, Bronchoscopy, and Intercostal Nerve Block;  Surgeon: Alma Gastelum MD;  Location: [facility];  Service: Robotics - DaVinci;  Laterality: Right;   • TOTAL KNEE ARTHROPLASTY Right 10/22/2021    Procedure: TOTAL KNEE ARTHROPLASTY;  Surgeon: Iban Corrales MD;  Location: [facility];  Service: Orthopedics;  Laterality: Right;       SOCIAL HISTORY:   reports that he quit smoking about 28 years ago. His smoking use included cigarettes. He started smoking about 64 years ago. He has a 35.8 pack-year smoking history. He has never used smokeless tobacco. He reports current alcohol use. He  "reports that he does not use drugs.    FAMILY HISTORY:  family history includes Diabetes in his father, maternal grandfather, and another family member; Heart attack in his brother and father; Heart disease in his brother.    ALLERGIES:  No Known Allergies    MEDICATIONS:  The medication list has been reviewed with the patient by the medical assistant, and the list has been updated in the electronic medical record, which I reviewed.  Medication dosages and frequencies were confirmed to be accurate.    REVIEW OF SYSTEMS  Review of Systems   Respiratory:  Positive for cough and chest tightness (post-operative, improving).    All other systems reviewed and are negative.      PHYSICAL EXAMINATION  Vitals:    04/16/25 1446   BP: 133/83   Pulse: 68   Temp: 97.6 °F (36.4 °C)   TempSrc: Oral   SpO2: 94%   Weight: 100 kg (221 lb 8 oz)   Height: 177.8 cm (70\")   PainSc: 0-No pain     General:  No acute distress, awake, alert and oriented  Skin:  Warm and dry, no visible rash  HEENT:  Normocephalic/atraumatic.   Chest:  Normal respiratory effort  Extremities:  No visible clubbing, cyanosis, or edema  Neuro/psych:  Grossly nonfocal.  Normal mood and affect.       DIAGNOSTIC DATA:    Results for orders placed or performed in visit on 04/16/25   CBC Auto Differential    Collection Time: 04/16/25  2:01 PM    Specimen: Blood   Result Value Ref Range    WBC 6.47 3.40 - 10.80 10*3/mm3    RBC 5.06 4.14 - 5.80 10*6/mm3    Hemoglobin 15.2 13.0 - 17.7 g/dL    Hematocrit 44.9 37.5 - 51.0 %    MCV 88.7 79.0 - 97.0 fL    MCH 30.0 26.6 - 33.0 pg    MCHC 33.9 31.5 - 35.7 g/dL    RDW 13.9 12.3 - 15.4 %    RDW-SD 44.4 37.0 - 54.0 fl    MPV 9.3 6.0 - 12.0 fL    Platelets 285 140 - 450 10*3/mm3    Neutrophil % 59.2 42.7 - 76.0 %    Lymphocyte % 21.8 19.6 - 45.3 %    Monocyte % 6.8 5.0 - 12.0 %    Eosinophil % 10.2 (H) 0.3 - 6.2 %    Basophil % 1.2 0.0 - 1.5 %    Immature Grans % 0.8 (H) 0.0 - 0.5 %    Neutrophils, Absolute 3.83 1.70 - 7.00 " 10*3/mm3    Lymphocytes, Absolute 1.41 0.70 - 3.10 10*3/mm3    Monocytes, Absolute 0.44 0.10 - 0.90 10*3/mm3    Eosinophils, Absolute 0.66 (H) 0.00 - 0.40 10*3/mm3    Basophils, Absolute 0.08 0.00 - 0.20 10*3/mm3    Immature Grans, Absolute 0.05 0.00 - 0.05 10*3/mm3    nRBC 0.0 0.0 - 0.2 /100 WBC       IMAGING:    NM PET/CT Skull Base to Mid Thigh (02/05/2025 14:41)  CT Chest Without Contrast Diagnostic (01/16/2025 08:04)    Images reviewed. Small but enlarging right lung nodule.    ASSESSMENT:    This is a 84 y.o. male with:    *Stage Ib adenocarcinoma of the right upper lobe  He has a history of right upper lobe pulmonary nodules dating back at least 2 November 2019.  CT imaging on 7/10/2024 showed slightly larger 6 mm peripheral right upper lobe lung nodule.  1/16/2025: CT imaging with enlarging 10 mm peripheral subpleural focus of consolidation in the right upper lobe.  1/29/2025: CT-guided needle biopsy with moderately differentiated pulmonary adenocarcinoma.  2/5/2025: PET CT scan with some breathing artifact.  No mediastinal or hilar lymphadenopathy.  No other metastatic disease.  3/7/2025: Wedge resection by Dr. Gastelum.  Pathology showed invasive moderately differentiated pulmonary adenocarcinoma measuring 0.8 x 0.8 x 0.7 cm with visceral pleura invasion.  Margins were negative.  All lymph nodes including a right level 8, right level 7, right level 4, right level 10, and right level 11 node were negative.  Pathologic stage Ib, pT2, N0.    *History of prostate cancer  Status post open prostatectomy many years ago  No evidence for recurrence    PLAN:   Send [facility] NGS on the surgical specimen from 3/7  No adjuvant chemotherapy is required given the very small tumor size  If an EGFR mutation is present on NGS, adjuvant osimertinib will be offered  I will follow up NGS results and see him back if needed. Otherwise, he will follow up with thoracic surgery for surveillance imaging.

## 2025-04-25 LAB
CYTO UR: NORMAL
LAB AP CASE REPORT: NORMAL
LAB AP CLINICAL INFORMATION: NORMAL
LAB AP DIAGNOSIS COMMENT: NORMAL
LAB AP SPECIAL STAINS: NORMAL
LAB AP SYNOPTIC CHECKLIST: NORMAL
Lab: NORMAL
PATH REPORT.ADDENDUM SPEC: NORMAL
PATH REPORT.FINAL DX SPEC: NORMAL
PATH REPORT.GROSS SPEC: NORMAL

## 2025-05-07 LAB
CYTO UR: NORMAL
LAB AP CASE REPORT: NORMAL
LAB AP CLINICAL INFORMATION: NORMAL
LAB AP DIAGNOSIS COMMENT: NORMAL
LAB AP SPECIAL STAINS: NORMAL
LAB AP SYNOPTIC CHECKLIST: NORMAL
Lab: NORMAL
Lab: NORMAL
PATH REPORT.ADDENDUM SPEC: NORMAL
PATH REPORT.FINAL DX SPEC: NORMAL
PATH REPORT.GROSS SPEC: NORMAL

## 2025-05-19 ENCOUNTER — PATIENT OUTREACH (OUTPATIENT)
Dept: OTHER | Facility: HOSPITAL | Age: 85
End: 2025-05-19
Payer: MEDICARE

## 2025-05-19 NOTE — PROGRESS NOTES
Reviewed chart.  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 Met with Dr. Hutson 4/18. No adjuvant chemotherapy required. CT scan 7/11, Claudine 7/24    Called patient. Left message that I was just touching base to see how he was doing  Wanted to know if he had any questions/concerns or resource/supportive care needs; requested cb.

## 2025-06-03 ENCOUNTER — PATIENT OUTREACH (OUTPATIENT)
Dept: OTHER | Facility: HOSPITAL | Age: 85
End: 2025-06-03
Payer: MEDICARE

## 2025-06-03 NOTE — PROGRESS NOTES
Reviewed chart. 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  CT chest scheduled 7/11, sees thoracic surgery CRYS Chow 7/24     Follow up call placed to patient.  Left voice mail with my contact information asking that he call back at his convenience

## 2025-06-06 ENCOUNTER — OFFICE VISIT (OUTPATIENT)
Dept: CARDIOLOGY | Age: 85
End: 2025-06-06
Payer: MEDICARE

## 2025-06-06 VITALS
SYSTOLIC BLOOD PRESSURE: 122 MMHG | HEIGHT: 70 IN | BODY MASS INDEX: 30.49 KG/M2 | DIASTOLIC BLOOD PRESSURE: 70 MMHG | HEART RATE: 61 BPM | WEIGHT: 213 LBS

## 2025-06-06 DIAGNOSIS — C61 MALIGNANT NEOPLASM OF PROSTATE: ICD-10-CM

## 2025-06-06 DIAGNOSIS — I25.810 CORONARY ARTERY DISEASE INVOLVING CORONARY BYPASS GRAFT OF NATIVE HEART WITHOUT ANGINA PECTORIS: Primary | ICD-10-CM

## 2025-06-06 DIAGNOSIS — I10 PRIMARY HYPERTENSION: ICD-10-CM

## 2025-06-06 DIAGNOSIS — C34.11 MALIGNANT NEOPLASM OF BRONCHUS OF RIGHT UPPER LOBE: ICD-10-CM

## 2025-06-06 DIAGNOSIS — I71.21 ANEURYSM OF ASCENDING AORTA WITHOUT RUPTURE: ICD-10-CM

## 2025-06-06 PROCEDURE — 3078F DIAST BP <80 MM HG: CPT | Performed by: INTERNAL MEDICINE

## 2025-06-06 PROCEDURE — 93000 ELECTROCARDIOGRAM COMPLETE: CPT | Performed by: INTERNAL MEDICINE

## 2025-06-06 PROCEDURE — 99213 OFFICE O/P EST LOW 20 MIN: CPT | Performed by: INTERNAL MEDICINE

## 2025-06-06 PROCEDURE — 3074F SYST BP LT 130 MM HG: CPT | Performed by: INTERNAL MEDICINE

## 2025-06-06 NOTE — PROGRESS NOTES
Date of Office Visit: 2025  Encounter Provider: Giana Hughes MD  Place of Service: [facility] CARDIOLOGY  Patient Name: Steve Mace  :1940    Chief complaint  Follow-up of pericardial effusion, cardiomyopathy, valvular heart disease, coronary artery disease    History of Present Illness  Patient is a pleasant 85-year-old gentleman with diabetes, hypertension, hyperlipidemia.  In  he was seen for atypical chest pain.  A CT scan of his chest revealed a large pericardial effusion which was treated with nonsteroidals and resolution of his symptoms and effusion.  Echocardiogram revealed left ventricular dilatation with an ejection fraction of 45-50%, no significant valvular heart disease and aortic root size of 4.3 cm.  He had a PET scan that was negative for ischemia.  By  with medical therapy his ejection fraction normalized to 50%.  He was noted to have mitral valve prolapse without regurgitation.  He also noted to have multivessel coronary artery disease by coronary CT angiography.  In May 2017 he had an echocardiogram revealed an ejection fraction of 60% with an aortic root measured at 4.0 cm.  Ascending aorta was measured 3.7 cm.  Aortic valve is trileaflet and functioning normally.  He had a stress perfusion study that was negative for ischemia.  The right ventricle was enlarged though not noted by echocardiography.  CT angiogram of his chest in 2019 with ascending aorta was felt to be mildly dilated and unchanged and measuring 3.9 cm with mild coronary artery calcification. With dyspnea symptoms and 2022 stress perfusion study was negative for ischemia.  Vascular screening study and 2022 was normal.  CT chest 2025 showed ascending aorta measuring 4.0 cm, coronary calcification, no pericardial effusion lung consolidation with increase in size.  On 2025 patient was admitted for right upper lobe wedge resection found to have  adenocarcinoma.    Since last visit patient is golfing 3 times a week has had no chest pain shortness of breath palpitations syncope near syncope.  Is using CPAP consistently    Past Medical History:   Diagnosis Date    Acute myopericarditis     Arthritis     OSTEOARTHRITIS    Chronic renal insufficiency     COPD (chronic obstructive pulmonary disease)     DR. CABRERA EVERY 6 MONTHS    Coronary artery disease     Diabetes     Hyperlipidemia     Hypertension     Lumbosacral disc disease 1970'S    Lung cancer     RIGHT UPPER LOBE    Mitral valve prolapse     Prostate cancer     Skin cancer     Sleep apnea     CPAP-SOME USE, PROVIDED EDUCATION ON SAFE SURGERY SALE SLEEP    Thoracic aortic aneurysm without rupture     Urinary incontinence     WEAR DAILY PAD     Past Surgical History:   Procedure Laterality Date    COLONOSCOPY      LUNG CANCER SURGERY Right 2025    PROSTATECTOMY      THORACOSCOPY WITH WEDGE RESECTION Right 03/07/2025    Procedure: THORACOSCOPY WITH DAVINCI ROBOT with right upper lobe wedge resection, Medialstinal and Hylar Lymph Node Dissection, Bronchoscopy, and Intercostal Nerve Block;  Surgeon: Alma Gastelum MD;  Location: [facility];  Service: Robotics - DaVinci;  Laterality: Right;    TOTAL KNEE ARTHROPLASTY Right 10/22/2021    Procedure: TOTAL KNEE ARTHROPLASTY;  Surgeon: Iban Corrales MD;  Location: [facility];  Service: Orthopedics;  Laterality: Right;     Outpatient Medications Prior to Visit   Medication Sig Dispense Refill    aspirin 81 MG EC tablet Take 1 tab po bid x 14 days; then take 1 tab po daily x 28 days (Patient taking differently: Take 1 tablet by mouth Every Night. HOLDING FOR SURGERY) 60 tablet 0    atorvastatin (LIPITOR) 40 MG tablet Take 1 tablet by mouth Daily.      Coenzyme Q10 (COQ-10 PO) Take 100 mg by mouth Every Night. HOLD FOR SURGERY      empagliflozin (Jardiance) 25 MG tablet tablet Take  by mouth Daily.      losartan (COZAAR) 50 MG tablet Take 1 tablet by mouth  Daily.      metFORMIN (GLUCOPHAGE) 500 MG tablet Take 1 tablet by mouth 2 (Two) Times a Day With Meals.      metoprolol tartrate (LOPRESSOR) 50 MG tablet Take 0.5 tablets by mouth Daily.      multivitamin (THERAGRAN) tablet tablet Take  by mouth Every Night. HOLD FOR SURGERY      Omega-3 Fatty Acids (fish oil) 1000 MG capsule capsule Take  by mouth Every Night. HOLD FOR SURGERY      SYMBICORT 160-4.5 MCG/ACT inhaler Inhale 2 puffs 2 (Two) Times a Day. 2 PUFFS IN THE AM 1 PUFF AT BEDTIME      gabapentin (NEURONTIN) 100 MG capsule Take 1 capsule by mouth Every 8 (Eight) Hours for 30 days. 90 capsule 0     Facility-Administered Medications Prior to Visit   Medication Dose Route Frequency Provider Last Rate Last Admin    Chlorhexidine Gluconate Cloth 2 % pads   Apply externally BID Mindy Bustos APRN        Chlorhexidine Gluconate Cloth 2 % pads   Apply externally Take As Directed Iban Corrales MD           Allergies as of 2025    (No Known Allergies)     Social History     Socioeconomic History    Marital status:      Spouse name: Nicol   Tobacco Use    Smoking status: Former     Current packs/day: 0.00     Average packs/day: 1 pack/day for 35.8 years (35.8 ttl pk-yrs)     Types: Cigarettes     Start date:      Quit date: 10/18/1996     Years since quittin.7    Smokeless tobacco: Never   Vaping Use    Vaping status: Never Used   Substance and Sexual Activity    Alcohol use: Yes     Comment: 4 NIGHTS WEEK    Drug use: No    Sexual activity: Yes     Partners: Female     Family History   Problem Relation Age of Onset    Heart attack Father     Diabetes Father     Heart disease Brother     Heart attack Brother     Diabetes Other     Diabetes Maternal Grandfather     Malig Hyperthermia Neg Hx      Review of Systems   Constitutional: Positive for weight loss. Negative for chills, fever and weight gain.   Cardiovascular:  Negative for leg swelling.   Respiratory:  Negative for cough, snoring and  "wheezing.    Hematologic/Lymphatic: Negative for bleeding problem. Bruises/bleeds easily.   Skin:  Negative for color change.   Musculoskeletal:  Positive for joint pain. Negative for falls and myalgias.   Gastrointestinal:  Negative for melena.   Genitourinary:  Negative for hematuria.   Neurological:  Negative for excessive daytime sleepiness.   Psychiatric/Behavioral:  Negative for depression. The patient is not nervous/anxious.         Objective:     Vitals:    06/06/25 0705   BP: 122/70   BP Location: Right arm   Patient Position: Sitting   Cuff Size: Small Adult   Pulse: 61   Weight: 96.6 kg (213 lb)   Height: 177.8 cm (70\")     Body mass index is 30.56 kg/m².    Vitals reviewed.   Constitutional:       Appearance: Well-developed. Obese.   Eyes:      General: No scleral icterus.        Right eye: No discharge.      Conjunctiva/sclera: Conjunctivae normal.      Pupils: Pupils are equal, round, and reactive to light.   HENT:      Head: Normocephalic.      Nose: Nose normal.   Neck:      Thyroid: No thyromegaly.      Vascular: No JVD.   Pulmonary:      Effort: Pulmonary effort is normal. No respiratory distress.      Breath sounds: Normal breath sounds. No wheezing. No rales.   Cardiovascular:      Normal rate. Regular rhythm. Normal S1. Normal S2.       Murmurs: There is no murmur.      No gallop.    Pulses:     Intact distal pulses.      Carotid: 2+ bilaterally.     Radial: 2+ bilaterally.     Femoral: 2+ bilaterally.     Popliteal: 2+ bilaterally.     Dorsalis pedis: 2+ bilaterally.     Posterior tibial: 2+ bilaterally.  Edema:     Peripheral edema absent.   Abdominal:      General: Bowel sounds are normal. There is no distension.      Palpations: Abdomen is soft.      Tenderness: There is no abdominal tenderness. There is no rebound.   Musculoskeletal: Normal range of motion.         General: No tenderness.      Cervical back: Normal range of motion and neck supple. Skin:     General: Skin is warm and dry.    "   Findings: No erythema or rash.   Neurological:      Mental Status: Alert and oriented to person, place, and time.   Psychiatric:         Behavior: Behavior normal.         Thought Content: Thought content normal.         Judgment: Judgment normal.       Lab Review:   Lab Results - Last 18 Months   Lab Units 04/16/25  1401 03/08/25  0622   WBC 10*3/mm3 6.47 10.74   RBC 10*6/mm3 5.06 4.92   HEMOGLOBIN g/dL 15.2 14.9   HEMATOCRIT % 44.9 44.5   MCV fL 88.7 90.4   MCH pg 30.0 30.3   MCHC g/dL 33.9 33.5   RDW % 13.9 13.4   PLATELETS 10*3/mm3 285 216   NEUTROPHIL % % 59.2 89.0*   LYMPHOCYTE % % 21.8 5.3*   MONOCYTES % % 6.8 5.1   EOSINOPHIL % % 10.2* 0.0*   BASOPHIL % % 1.2 0.4   NEUTROS ABS 10*3/mm3 3.83 9.56*   LYMPHS ABS 10*3/mm3 1.41 0.57*   MONOS ABS 10*3/mm3 0.44 0.55   EOS ABS 10*3/mm3 0.66* 0.00   BASOS ABS 10*3/mm3 0.08 0.04   RDW-SD fl 44.4 45.0   MPV fL 9.3 9.7       Lab Results - Last 18 Months   Lab Units 03/08/25  0622 02/28/25  0944   GLUCOSE mg/dL 209* 170*   BUN mg/dL 19 22   CREATININE mg/dL 1.21 1.35*   SODIUM mmol/L 133* 138   POTASSIUM mmol/L 4.4 4.4   CHLORIDE mmol/L 102 104   CO2 mmol/L 18.8* 21.5*   CALCIUM mg/dL 8.7 9.7   BUN / CREAT RATIO  15.7 16.3   ANION GAP mmol/L 12.2 12.5   EGFR mL/min/1.73 59.8* 52.4*       Lab Results - Last 18 Months   Lab Units 01/29/25  0855   PROTIME seconds 11.6*         ECG 12 Lead    Date/Time: 6/6/2025 12:51 AM  Performed by: Giana Hughes MD    Authorized by: Giana Hughes MD  Comparison: compared with previous ECG   Similar to previous ECG  Rhythm: sinus rhythm    Clinical impression: normal ECG        Assessment:       Diagnosis Plan   1. Coronary artery disease involving coronary bypass graft of native heart without angina pectoris  ECG 12 Lead      2. Primary hypertension  ECG 12 Lead      3. Aneurysm of ascending aorta without rupture  ECG 12 Lead      4. Malignant neoplasm of bronchus of right upper lobe  ECG 12 Lead      5. Malignant neoplasm of prostate           Plan:           1.  Thoracic aortic aneurysm, CT 1/2025 was 4.0 cm (unchanged from 4/2023).  Good blood pressure control follow-up imaging in 1 year if not done earlier from a pulmonary standpoint.  2.  Exertional fatigue.  Due to deconditioning.  He will work on increasing exercise slowly.  Return for follow-up in 3 months.  If symptoms not better will check proBNP and echocardiogram.  Will need further coronary evaluation.  3.  Hypertension.  Appears controlled.  4.  Renal insufficiency.  Labs improved on blood work 3/2025  5.  Coronary artery calcification.  No anginal symptoms with no ischemia on stress test 6/2022.  6.  Diabetes.  7.  Questionable mitral valve prolapse.  Not evident on last echo 2021.  8.  Dyslipidemia.  He will forward a copy of labs  9.  Obstructive sleep apnea, on CPAPtherapy  10  Vascular screening.  Negative vascular screening study 6/2022  10.  Adenocarcinoma of the lung, s/p right sided wedge lung resection 3/2025      Time Spent: I spent 35 minutes caring for Steve on this date of service. This time includes time spent by me in the following activities: preparing for the visit, reviewing tests, obtaining and/or reviewing a separately obtained history, performing a medically appropriate examination and/or evaluation, counseling and educating the patient/family/caregiver, documenting information in the medical record, and independently interpreting results and communicating that information with the patient/family/caregiver.   I spent 1 minutes on the separately reported service of ECG. This time is not included in the time used to support the E/M service also reported today.        Your medication list            Accurate as of June 6, 2025 11:59 PM. If you have any questions, ask your nurse or doctor.                CHANGE how you take these medications        Instructions Last Dose Given Next Dose Due   aspirin 81 MG EC tablet  What changed:   how much to take  how to take  this  when to take this  additional instructions      Take 1 tab po bid x 14 days; then take 1 tab po daily x 28 days              CONTINUE taking these medications        Instructions Last Dose Given Next Dose Due   atorvastatin 40 MG tablet  Commonly known as: LIPITOR      Take 1 tablet by mouth Daily.       COQ-10 PO      Take 100 mg by mouth Every Night. HOLD FOR SURGERY       fish oil 1000 MG capsule capsule      Take  by mouth Every Night. HOLD FOR SURGERY       Jardiance 25 MG tablet tablet  Generic drug: empagliflozin      Take  by mouth Daily.       losartan 50 MG tablet  Commonly known as: COZAAR      Take 1 tablet by mouth Daily.       metFORMIN 500 MG tablet  Commonly known as: GLUCOPHAGE      Take 1 tablet by mouth 2 (Two) Times a Day With Meals.       metoprolol tartrate 50 MG tablet  Commonly known as: LOPRESSOR      Take 0.5 tablets by mouth Daily.       multivitamin tablet tablet      Take  by mouth Every Night. HOLD FOR SURGERY       Symbicort 160-4.5 MCG/ACT inhaler  Generic drug: budesonide-formoterol      Inhale 2 puffs 2 (Two) Times a Day. 2 PUFFS IN THE AM 1 PUFF AT BEDTIME                Patient is no longer taking -.  I corrected the med list to reflect this.  I did not stop these medications.      Dictated utilizing Dragon dictation

## 2025-06-24 ENCOUNTER — PATIENT OUTREACH (OUTPATIENT)
Dept: OTHER | Facility: HOSPITAL | Age: 85
End: 2025-06-24
Payer: MEDICARE

## 2025-06-24 NOTE — PROGRESS NOTES
Reviewed chart. S/p RUL wedge resection 3/7 CT scan 7/11 F/u thoracic appt with Claudine 130    Called patient's wife, preferred contact, left  requesting cb. Called patient on alternative number. He is doing well; he is playing golf right now. Has no pain. We discussed his upcoming CT scan and appt with Claudine.     The patient denies any questions/concerns or ongoing resource needs. Navigation will continue to follow; encouraged patient to call as needed.

## 2025-07-11 ENCOUNTER — HOSPITAL ENCOUNTER (OUTPATIENT)
Facility: HOSPITAL | Age: 85
Discharge: HOME | End: 2025-07-11
Payer: MEDICARE

## 2025-07-11 DIAGNOSIS — C34.91 NSCLC OF RIGHT LUNG: ICD-10-CM

## 2025-07-11 PROCEDURE — 71250 CT THORAX DX C-: CPT

## 2025-07-24 ENCOUNTER — OFFICE VISIT (OUTPATIENT)
Dept: SURGERY | Facility: CLINIC | Age: 85
End: 2025-07-24
Payer: MEDICARE

## 2025-07-24 VITALS
BODY MASS INDEX: 31.02 KG/M2 | SYSTOLIC BLOOD PRESSURE: 131 MMHG | RESPIRATION RATE: 18 BRPM | OXYGEN SATURATION: 97 % | HEART RATE: 73 BPM | DIASTOLIC BLOOD PRESSURE: 61 MMHG | WEIGHT: 216.2 LBS

## 2025-07-24 DIAGNOSIS — C34.91 NSCLC OF RIGHT LUNG: Primary | ICD-10-CM

## 2025-07-24 PROCEDURE — 1159F MED LIST DOCD IN RCRD: CPT | Performed by: NURSE PRACTITIONER

## 2025-07-24 PROCEDURE — 1160F RVW MEDS BY RX/DR IN RCRD: CPT | Performed by: NURSE PRACTITIONER

## 2025-07-24 PROCEDURE — 3075F SYST BP GE 130 - 139MM HG: CPT | Performed by: NURSE PRACTITIONER

## 2025-07-24 PROCEDURE — 3078F DIAST BP <80 MM HG: CPT | Performed by: NURSE PRACTITIONER

## 2025-07-24 PROCEDURE — 99214 OFFICE O/P EST MOD 30 MIN: CPT | Performed by: NURSE PRACTITIONER

## 2025-07-24 NOTE — PROGRESS NOTES
"Chief Complaint  NSCLC OF RIGHT LUNG (CT 7/11- THORACOSCOPY RT LOBE RESECTION 3/7- 4 M FU)    Subjective        History of Present Illness  The patient is an 85-year-old gentleman status post right upper lobe wedge resection for a T2aN0 adenocarcinoma with visceral pleural invasion. The surgery was performed on 03/24/2025. He follows with Dr. Hutson of medical oncology. He presents today with a CT chest for surveillance.    He reports feeling well overall. His last consultation with Dr. Hutson was satisfactory, and he was informed that he would be contacted if any issues arose. He expresses curiosity about the term \"pleural effusion\" that was mentioned to him. He also seeks clarification on the meaning of \"calcification.\" He is currently under the care of Dr. Hughes.    MEDICATIONS  atorvastatin    Objective   Vital Signs:  /61 (BP Location: Left arm, Patient Position: Sitting, Cuff Size: Adult)   Pulse 73   Resp 18   Wt 98.1 kg (216 lb 3.2 oz)   SpO2 97%   BMI 31.02 kg/m²   Estimated body mass index is 31.02 kg/m² as calculated from the following:    Height as of 6/6/25: 177.8 cm (70\").    Weight as of this encounter: 98.1 kg (216 lb 3.2 oz).          Physical Exam  Constitutional:       General: He is not in acute distress.     Appearance: Normal appearance. He is not ill-appearing.   HENT:      Head: Normocephalic and atraumatic.   Cardiovascular:      Rate and Rhythm: Normal rate.   Pulmonary:      Effort: Pulmonary effort is normal.   Musculoskeletal:         General: Normal range of motion.      Cervical back: Normal range of motion and neck supple.   Skin:     General: Skin is warm and dry.   Neurological:      General: No focal deficit present.      Mental Status: He is alert. Mental status is at baseline.      Motor: No weakness.   Psychiatric:         Mood and Affect: Mood normal.         Thought Content: Thought content normal.          Physical Exam      Result Review :         The following results " are independently reviewed and interpreted by myself.     Results  Imaging  CT chest from 07/11/2025 demonstrates postsurgical changes consistent with prior right upper lobe wedge resection. There is a stable 6 mm groundglass opacity in the right apex. There is a stable 5 mm nodule in the right middle lobe. No new or enlarging pulmonary nodules. No pleural or pericardial effusion.           Assessment and Plan   Diagnoses and all orders for this visit:    1. NSCLC of right lung (Primary)  -     CT Chest Without Contrast; Future        Assessment & Plan  1. Post-surgical status.  He is status post right upper lobe wedge resection for a T2a N0 adenocarcinoma with visceral pleural invasion, performed on 3/24/2025. The CT chest from 7/11/2025 shows postsurgical changes consistent with the prior resection, with no new or enlarging pulmonary nodules and no pleural or pericardial effusion. A repeat CT chest will be scheduled in 4 months for further evaluation.    2. Groundglass opacity.  A stable 6 mm groundglass opacity in the right apex was noted on the CT chest. This will be monitored with the repeat CT chest in 4 months.    3. Pulmonary nodule.  A stable 5 mm nodule in the right middle lobe was observed. This will also be monitored with the repeat CT chest in 4 months.    4. Pleural effusion.  A small amount of fluid was noted, which is expected post-surgery. No immediate intervention is required.    5. Cardiac Calcification.  Calcification was noted incidentally. He is currently on atorvastatin (Lipitor) for this condition. He should continue taking atorvastatin as prescribed. If there are any changes in his condition, follow-up with cardiology will be recommended.       I spent 33 minutes caring for Steve on this date of service. This time includes time spent by me in the following activities:preparing for the visit, reviewing tests, obtaining and/or reviewing a separately obtained history, performing a medically  appropriate examination and/or evaluation , counseling and educating the patient/family/caregiver, ordering medications, tests, or procedures, referring and communicating with other health care professionals , documenting information in the medical record, independently interpreting results and communicating that information with the patient/family/caregiver, and care coordination  Follow Up   Return in about 4 months (around 11/24/2025) for Next scheduled follow up.  Patient was given instructions and counseling regarding his condition or for health maintenance advice. Please see specific information pulled into the AVS if appropriate.     Patient or patient representative verbalized consent for the use of Ambient Listening during the visit with  Claudine Arroyo DNP, APRN for chart documentation. 7/24/2025  15:03 EDT

## (undated) DEVICE — GLV SURG PREMIERPRO ORTHO LTX PF SZ7.5 BRN

## (undated) DEVICE — APPL CHLORAPREP HI/LITE 26ML ORNG

## (undated) DEVICE — APPL DURAPREP IODOPHOR APL 26ML

## (undated) DEVICE — NEEDLE, QUINCKE 22GX3.5": Brand: MEDLINE INDUSTRIES, INC.

## (undated) DEVICE — GLV SURG SENSICARE W/ALOE PF LF 8 STRL

## (undated) DEVICE — PATIENT RETURN ELECTRODE, SINGLE-USE, CONTACT QUALITY MONITORING, ADULT, WITH 9FT CORD, FOR PATIENTS WEIGING OVER 33LBS. (15KG): Brand: MEGADYNE

## (undated) DEVICE — ANTIBACTERIAL UNDYED BRAIDED (POLYGLACTIN 910), SYNTHETIC ABSORBABLE SUTURE: Brand: COATED VICRYL

## (undated) DEVICE — DUAL CUT SAGITTAL BLADE

## (undated) DEVICE — GLV SURG SENSICARE PI PF LF 7 GRN STRL

## (undated) DEVICE — ARM DRAPE

## (undated) DEVICE — SYS CLS SKIN PREMIERPRO EXOFINFUSION 22CM

## (undated) DEVICE — GLV SURG SENSICARE PI MIC PF SZ7 LF STRL

## (undated) DEVICE — SUREFORM 45 CURVED-TIP: Brand: SUREFORM

## (undated) DEVICE — 3M™ STERI-DRAPE™ INSTRUMENT POUCH 1018L: Brand: STERI-DRAPE™

## (undated) DEVICE — KT DRN EVAC WND PVC PCH WTROC RND 10F400

## (undated) DEVICE — COLUMN DRAPE

## (undated) DEVICE — CONTAINER,SPECIMEN,OR STERILE,4OZ: Brand: MEDLINE

## (undated) DEVICE — PENCL ES MEGADINE EZ/CLEAN BUTN W/HOLSTR 10FT

## (undated) DEVICE — SOL NACL 0.9PCT 100ML SGL

## (undated) DEVICE — ELECTRD BLD EZ CLN MOD XLNG 2.75IN

## (undated) DEVICE — SPNG LAP CIGARETTE KITTNER 5MM STRL PK/5

## (undated) DEVICE — PK KN TOTL 40

## (undated) DEVICE — 1LYRTR 16FR10ML100%SIL UMS SNP: Brand: MEDLINE INDUSTRIES, INC.

## (undated) DEVICE — 2, DISPOSABLE SUCTION/IRRIGATOR WITH DISPOSABLE TIP: Brand: STRYKEFLOW

## (undated) DEVICE — ENDOPATH XCEL BLADELESS TROCARS WITH STABILITY SLEEVES: Brand: ENDOPATH XCEL

## (undated) DEVICE — Device

## (undated) DEVICE — SYR LUERLOK 20CC BX/50

## (undated) DEVICE — KT CLN CLEANOR SCPE

## (undated) DEVICE — MARKR SKIN W/RULR 2TP

## (undated) DEVICE — MAT FLR ABSORBENT LG 4FT 10 2.5FT

## (undated) DEVICE — SUT SILK 0 TIES 30IN A306H

## (undated) DEVICE — BLADELESS OBTURATOR: Brand: WECK VISTA

## (undated) DEVICE — THE STERILE LIGHT HANDLE COVER IS USED WITH STERIS SURGICAL LIGHTING AND VISUALIZATION SYSTEMS.

## (undated) DEVICE — SEAL

## (undated) DEVICE — ST TBG AIRSEAL BIF FLTR W/ACT/CHARCOAL/FLTR

## (undated) DEVICE — TRAP FLD MINIVAC MEGADYNE 100ML

## (undated) DEVICE — PREMIUM WET SKIN PREP TRAY: Brand: MEDLINE INDUSTRIES, INC.

## (undated) DEVICE — MEDI-VAC YANKAUER SUCTION HANDLE W/BULBOUS TIP: Brand: CARDINAL HEALTH

## (undated) DEVICE — PENCL E/S ULTRAVAC TELESCP NOSE HOLSTR 10FT

## (undated) DEVICE — OASIS DRAIN, SINGLE, INLINE & ATS COMPATIBLE: Brand: OASIS

## (undated) DEVICE — TROC BLADLES AIRSEAL/OPTI THRD 8X120MM 1P/U

## (undated) DEVICE — EXTENSION SET, MALE LUER LOCK ADAPTER WITH RETRACTABLE COLLAR

## (undated) DEVICE — BNDG ELAS ELITE V/CLOSE 6IN 5YD LF STRL

## (undated) DEVICE — STERILE PATIENT PROTECTIVE PAD FOR IMP® KNEE POSITIONERS & COHESIVE WRAP (10 / CASE): Brand: DE MAYO KNEE POSITIONER®

## (undated) DEVICE — UNDERCAST PADDING: Brand: DEROYAL

## (undated) DEVICE — PREP SOL POVIDONE/IODINE BT 4OZ

## (undated) DEVICE — SUT MNCRYL PLS ANTIB UD 4/0 PS2 18IN

## (undated) DEVICE — NDL INJ UROL WILLIAMS CYSTO 3.7F 23G 8MM

## (undated) DEVICE — EXOFIN PRECISION PEN HIGH VISCOSITY TOPICAL SKIN ADHESIVE: Brand: EXOFIN PRECISION PEN, 1G

## (undated) DEVICE — SUT VIC 0 CT1 36IN J946H

## (undated) DEVICE — STPLR SKIN VISISTAT WD 35CT

## (undated) DEVICE — GLV SURG BIOGEL LTX PF 6

## (undated) DEVICE — DRSNG SURESITE WNDW 2.38X2.75

## (undated) DEVICE — KIT,ANTI FOG,W/SPONGE & FLUID,SOFT PACK: Brand: MEDLINE

## (undated) DEVICE — 3M™ IOBAN™ 2 ANTIMICROBIAL INCISE DRAPE 6640EZ: Brand: IOBAN™ 2

## (undated) DEVICE — SOL NACL 0.9PCT 1000ML

## (undated) DEVICE — SUT VIC 1 CT1 36IN J947H

## (undated) DEVICE — LOU THORACIC: Brand: MEDLINE INDUSTRIES, INC.

## (undated) DEVICE — SOL ANTISTICK CAUTRY ELECTROLUBE LF

## (undated) DEVICE — GLV SURG BIOGEL LTX PF 7 1/2

## (undated) DEVICE — SUT SILK 0 PSL 18IN 580H

## (undated) DEVICE — GOWN,SIRUS,NONRNF,SETINSLV,XL,20/CS: Brand: MEDLINE